# Patient Record
Sex: FEMALE | Race: WHITE | NOT HISPANIC OR LATINO | Employment: OTHER | URBAN - METROPOLITAN AREA
[De-identification: names, ages, dates, MRNs, and addresses within clinical notes are randomized per-mention and may not be internally consistent; named-entity substitution may affect disease eponyms.]

---

## 2017-05-23 ENCOUNTER — TRANSCRIBE ORDERS (OUTPATIENT)
Dept: ADMINISTRATIVE | Facility: HOSPITAL | Age: 62
End: 2017-05-23

## 2017-05-23 DIAGNOSIS — Z12.31 ENCOUNTER FOR SCREENING MAMMOGRAM FOR BREAST CANCER: Primary | ICD-10-CM

## 2017-06-06 ENCOUNTER — HOSPITAL ENCOUNTER (OUTPATIENT)
Dept: MAMMOGRAPHY | Facility: HOSPITAL | Age: 62
Discharge: HOME | End: 2017-06-06
Attending: FAMILY MEDICINE | Admitting: FAMILY MEDICINE

## 2017-06-06 DIAGNOSIS — Z12.31 ENCOUNTER FOR SCREENING MAMMOGRAM FOR BREAST CANCER: ICD-10-CM

## 2017-06-06 PROCEDURE — 77063 BREAST TOMOSYNTHESIS BI: CPT

## 2017-06-06 PROCEDURE — G0202 SCR MAMMO BI INCL CAD: HCPCS

## 2018-04-30 ENCOUNTER — TRANSCRIBE ORDERS (OUTPATIENT)
Dept: ADMINISTRATIVE | Facility: HOSPITAL | Age: 63
End: 2018-04-30

## 2018-04-30 DIAGNOSIS — Z12.31 VISIT FOR SCREENING MAMMOGRAM: Primary | ICD-10-CM

## 2018-06-07 ENCOUNTER — HOSPITAL ENCOUNTER (OUTPATIENT)
Dept: MAMMOGRAPHY | Facility: HOSPITAL | Age: 63
Discharge: HOME | End: 2018-06-07
Attending: FAMILY MEDICINE | Admitting: FAMILY MEDICINE

## 2018-06-07 DIAGNOSIS — Z12.31 VISIT FOR SCREENING MAMMOGRAM: ICD-10-CM

## 2018-06-07 PROCEDURE — 77067 SCR MAMMO BI INCL CAD: CPT

## 2018-06-07 PROCEDURE — 77063 BREAST TOMOSYNTHESIS BI: CPT

## 2019-03-07 ENCOUNTER — OFFICE VISIT (OUTPATIENT)
Dept: OBSTETRICS AND GYNECOLOGY | Facility: CLINIC | Age: 64
End: 2019-03-07

## 2019-03-07 VITALS
SYSTOLIC BLOOD PRESSURE: 124 MMHG | WEIGHT: 182 LBS | DIASTOLIC BLOOD PRESSURE: 86 MMHG | BODY MASS INDEX: 32.25 KG/M2 | HEIGHT: 63 IN

## 2019-03-07 DIAGNOSIS — Z01.419 PAP SMEAR, LOW-RISK: Primary | ICD-10-CM

## 2019-03-07 DIAGNOSIS — Z01.419 ENCOUNTER FOR GYNECOLOGICAL EXAMINATION WITHOUT ABNORMAL FINDING: ICD-10-CM

## 2019-03-07 DIAGNOSIS — Z13.9 SCREENING FOR CONDITION: ICD-10-CM

## 2019-03-07 LAB
BILIRUB BLD-MCNC: NEGATIVE MG/DL
CLARITY, POC: CLEAR
COLOR UR: YELLOW
GLUCOSE UR STRIP-MCNC: NEGATIVE MG/DL
KETONES UR QL: NEGATIVE
LEUKOCYTE EST, POC: NEGATIVE
NITRITE UR-MCNC: NEGATIVE MG/ML
PH UR: 5 [PH] (ref 5–8)
PROT UR STRIP-MCNC: NEGATIVE MG/DL
RBC # UR STRIP: NEGATIVE /UL
SP GR UR: 1.03 (ref 1–1.03)
UROBILINOGEN UR QL: NORMAL

## 2019-03-07 PROCEDURE — 99397 PER PM REEVAL EST PAT 65+ YR: CPT | Performed by: OBSTETRICS & GYNECOLOGY

## 2019-03-07 PROCEDURE — 81002 URINALYSIS NONAUTO W/O SCOPE: CPT | Performed by: OBSTETRICS & GYNECOLOGY

## 2019-03-07 RX ORDER — LOSARTAN POTASSIUM 50 MG/1
50 TABLET ORAL DAILY
COMMUNITY
Start: 2014-11-04

## 2019-03-07 RX ORDER — AMLODIPINE BESYLATE 10 MG/1
10 TABLET ORAL DAILY
COMMUNITY
Start: 2014-11-04

## 2019-03-07 RX ORDER — TRIAMTERENE AND HYDROCHLOROTHIAZIDE 37.5; 25 MG/1; MG/1
1 TABLET ORAL EVERY OTHER DAY
COMMUNITY

## 2019-03-07 RX ORDER — ALLOPURINOL 100 MG/1
100 TABLET ORAL 2 TIMES DAILY
COMMUNITY
Start: 2019-02-19

## 2019-03-07 RX ORDER — CARVEDILOL 25 MG/1
25 TABLET ORAL 2 TIMES DAILY WITH MEALS
COMMUNITY
Start: 2019-02-19

## 2019-03-07 RX ORDER — ATORVASTATIN CALCIUM 10 MG/1
10 TABLET, FILM COATED ORAL DAILY
COMMUNITY
Start: 2014-11-04

## 2019-03-07 RX ORDER — CHLORHEXIDINE GLUCONATE 0.12 MG/ML
RINSE ORAL
COMMUNITY
Start: 2019-02-19 | End: 2019-06-24

## 2019-03-07 RX ORDER — MEDROXYPROGESTERONE ACETATE 150 MG/ML
50 INJECTION, SUSPENSION INTRAMUSCULAR
COMMUNITY
Start: 2019-01-05

## 2019-03-07 RX ORDER — FOLIC ACID 1 MG/1
400 TABLET ORAL DAILY
COMMUNITY
Start: 2014-11-04

## 2019-03-07 RX ORDER — RANITIDINE 150 MG/1
TABLET ORAL
COMMUNITY
End: 2019-06-24

## 2019-03-07 RX ORDER — ZOSTER VACCINE RECOMBINANT, ADJUVANTED 50 MCG/0.5
KIT INTRAMUSCULAR
COMMUNITY
Start: 2018-12-31

## 2019-03-07 RX ORDER — NAPROXEN SODIUM 220 MG
1 TABLET ORAL EVERY 12 HOURS
COMMUNITY
Start: 2014-11-04 | End: 2021-05-20 | Stop reason: HOSPADM

## 2019-03-07 NOTE — PROGRESS NOTES
GYN Annual Exam     CC- Here for annual exam.     Leann Andrew is a 63 y.o. female new patient who presents for annual well woman exam. She had BALTAZAR BSO at age 48 for endometriosis. No HRT, no  VB.       OB History      Para Term  AB Living    0 0 0 0 0 0    SAB TAB Ectopic Molar Multiple Live Births    0 0 0 0 0 0        Obstetric Comments    infertility          Menarche:11  Menopause:- BALTAZAR BSO endometriosis  HRT:none  Current contraception: post menopausal status  History of abnormal Pap smear: no  History of abnormal mammogram: no  Family history of uterine, colon or ovarian cancer: no  Family history of breast cancer: no  STD's: none  Last pap smear:3-4 years ago      Health Maintenance   Topic Date Due   • TDAP/TD VACCINES (1 - Tdap) 1972   • ZOSTER VACCINE (1 of 2) 2003   • PNEUMOCOCCAL VACCINES (65+ LOW/MEDIUM RISK) (1 of 2 - PCV13) 2018   • HEPATITIS C SCREENING  2019   • MEDICARE ANNUAL WELLNESS  2019   • COLONOSCOPY  2019   • INFLUENZA VACCINE  Completed       Past Medical History:   Diagnosis Date   • Endometriosis    • Female infertility    • GERD (gastroesophageal reflux disease)    • Hyperlipidemia    • Hypertension    • RA (rheumatoid arthritis) (CMS/HCC)        History reviewed. No pertinent surgical history.      Current Outpatient Medications:   •  adalimumab (HUMIRA) 40 MG/0.8ML Prefilled Syringe Kit injection, Inject  under the skin into the appropriate area as directed., Disp: , Rfl:   •  amLODIPine (NORVASC) 10 MG tablet, Take 1 tablet by mouth., Disp: , Rfl:   •  atorvastatin (LIPITOR) 10 MG tablet, Take 1 tablet by mouth., Disp: , Rfl:   •  folic acid (FOLVITE) 1 MG tablet, TAKE ONE TABLET BY MOUTH DAILY, Disp: , Rfl:   •  losartan (COZAAR) 50 MG tablet, Take 1 tablet by mouth., Disp: , Rfl:   •  naproxen sodium (ALEVE) 220 MG tablet, Take 1 tablet by mouth Every 12 (Twelve) Hours., Disp: , Rfl:   •  allopurinol (ZYLOPRIM) 100 MG  "tablet, , Disp: , Rfl:   •  carvedilol (COREG) 25 MG tablet, , Disp: , Rfl:   •  Cetirizine HCl 10 MG capsule, 1 q o day, Disp: , Rfl:   •  chlorhexidine (PERIDEX) 0.12 % solution, , Disp: , Rfl:   •  Cholecalciferol (VITAMIN D3) 1000 units capsule, Take 1 tablet by mouth., Disp: , Rfl:   •  ENBREL SURECLICK 50 MG/ML solution auto-injector, , Disp: , Rfl:   •  methotrexate 2.5 MG tablet, , Disp: , Rfl:   •  raNITIdine (ZANTAC) 150 MG tablet, 1 q o day, Disp: , Rfl:   •  SHINGRIX 50 MCG/0.5ML reconstituted suspension, , Disp: , Rfl:   •  triamterene-hydrochlorothiazide (MAXZIDE-25) 37.5-25 MG per tablet, Take 1 tablet by mouth., Disp: , Rfl:     Allergies   Allergen Reactions   • Lisinopril Other (See Comments)       Social History     Tobacco Use   • Smoking status: Never Smoker   • Smokeless tobacco: Never Used   Substance Use Topics   • Alcohol use: Yes     Comment: wine occasional   • Drug use: No       Family History   Problem Relation Age of Onset   • Breast cancer Neg Hx        Review of Systems   Constitutional: Negative for appetite change, fatigue, fever and unexpected weight change.   Respiratory: Negative for cough and shortness of breath.    Cardiovascular: Negative for chest pain and palpitations.   Gastrointestinal: Negative for abdominal distention, abdominal pain, constipation, diarrhea and nausea.   Endocrine: Negative for cold intolerance and heat intolerance.   Genitourinary: Negative for dyspareunia, dysuria, menstrual problem, pelvic pain and vaginal discharge.   Skin: Negative for color change and rash.   Neurological: Negative for headaches.   Psychiatric/Behavioral: Negative for dysphoric mood. The patient is not nervous/anxious.        /86   Ht 160 cm (63\")   Wt 82.6 kg (182 lb)   LMP  (LMP Unknown)   BMI 32.24 kg/m²     Physical Exam   Constitutional: She is oriented to person, place, and time. She appears well-developed and well-nourished.   HENT:   Head: Normocephalic and " atraumatic.   Eyes: Conjunctivae are normal. No scleral icterus.   Neck: Neck supple. No thyromegaly present.   Cardiovascular: Normal rate and regular rhythm.   Pulmonary/Chest: Effort normal and breath sounds normal. Right breast exhibits no inverted nipple, no mass, no nipple discharge, no skin change and no tenderness. Left breast exhibits no inverted nipple, no mass, no nipple discharge, no skin change and no tenderness.   Abdominal: Soft. Bowel sounds are normal. She exhibits no distension and no mass. There is no tenderness. There is no rebound and no guarding. No hernia.   Genitourinary: Pelvic exam was performed with patient supine. There is no rash, tenderness or lesion on the right labia. There is no rash, tenderness or lesion on the left labia. Right adnexum displays no mass, no tenderness and no fullness. Left adnexum displays no mass, no tenderness and no fullness. No erythema, tenderness or bleeding in the vagina. No foreign body in the vagina. No signs of injury around the vagina. No vaginal discharge found.   Genitourinary Comments: Normal cuff   Neurological: She is alert and oriented to person, place, and time.   Skin: Skin is warm and dry.   Psychiatric: She has a normal mood and affect. Her behavior is normal. Judgment and thought content normal.   Nursing note and vitals reviewed.         Assessment/Plan    1) GYN HM: check pap   SBE demonstrated and encouraged.  2) STD screening: declines Condoms encouraged.  3) Bone health - Weight bearing exercise, dietary calcium recommendations and vitamin D reviewed.   4) Diet and Exercise discussed  5) Smoking Status: No   6) Social: no issues  7)MMG:  UTD 6/2018, Birads 1  8) DEXA- UTD with Dr KENZIE Arzate  9)C scope- Due for 11/2019, pt will arrange herself.    Follow up prn and 1 year       Leann was seen today for gynecologic exam.    Diagnoses and all orders for this visit:    Pap smear, low-risk  -     Pap IG (Image Guided)    Screening for condition  -      POC Urinalysis Dipstick        Anne-Marie Solorzano MD  3/7/19  7:29 PM

## 2019-03-10 PROBLEM — E78.00 HYPERCHOLESTEREMIA: Status: ACTIVE | Noted: 2019-03-10

## 2019-03-10 PROBLEM — I10 HTN (HYPERTENSION): Status: ACTIVE | Noted: 2019-03-10

## 2019-03-11 LAB
CYTOLOGIST CVX/VAG CYTO: NORMAL
CYTOLOGY CVX/VAG DOC THIN PREP: NORMAL
DX ICD CODE: NORMAL
HIV 1 & 2 AB SER-IMP: NORMAL
OTHER STN SPEC: NORMAL
PATH REPORT.FINAL DX SPEC: NORMAL
STAT OF ADQ CVX/VAG CYTO-IMP: NORMAL

## 2019-04-23 ENCOUNTER — TRANSCRIBE ORDERS (OUTPATIENT)
Dept: ADMINISTRATIVE | Facility: HOSPITAL | Age: 64
End: 2019-04-23

## 2019-04-23 DIAGNOSIS — Z78.0 MENOPAUSE: Primary | ICD-10-CM

## 2019-05-01 ENCOUNTER — TRANSCRIBE ORDERS (OUTPATIENT)
Dept: OBSTETRICS AND GYNECOLOGY | Facility: CLINIC | Age: 64
End: 2019-05-01

## 2019-05-01 DIAGNOSIS — Z12.39 SCREENING BREAST EXAMINATION: Primary | ICD-10-CM

## 2019-05-14 ENCOUNTER — APPOINTMENT (OUTPATIENT)
Dept: BONE DENSITY | Facility: HOSPITAL | Age: 64
End: 2019-05-14

## 2019-05-14 DIAGNOSIS — Z78.0 MENOPAUSE: ICD-10-CM

## 2019-05-14 PROCEDURE — 77080 DXA BONE DENSITY AXIAL: CPT

## 2019-06-03 ENCOUNTER — TREATMENT (OUTPATIENT)
Dept: PHYSICAL THERAPY | Facility: CLINIC | Age: 64
End: 2019-06-03

## 2019-06-03 DIAGNOSIS — R52 PAIN AGGRAVATED BY SITTING: ICD-10-CM

## 2019-06-03 DIAGNOSIS — R26.2 DIFFICULTY WALKING: ICD-10-CM

## 2019-06-03 DIAGNOSIS — M54.42 ACUTE LEFT-SIDED LOW BACK PAIN WITH LEFT-SIDED SCIATICA: Primary | ICD-10-CM

## 2019-06-03 DIAGNOSIS — M06.9 RHEUMATOID ARTHRITIS, INVOLVING UNSPECIFIED SITE, UNSPECIFIED RHEUMATOID FACTOR PRESENCE: ICD-10-CM

## 2019-06-03 PROCEDURE — G0283 ELEC STIM OTHER THAN WOUND: HCPCS | Performed by: PHYSICAL THERAPIST

## 2019-06-03 PROCEDURE — 97140 MANUAL THERAPY 1/> REGIONS: CPT | Performed by: PHYSICAL THERAPIST

## 2019-06-03 PROCEDURE — 97035 APP MDLTY 1+ULTRASOUND EA 15: CPT | Performed by: PHYSICAL THERAPIST

## 2019-06-03 PROCEDURE — 97110 THERAPEUTIC EXERCISES: CPT | Performed by: PHYSICAL THERAPIST

## 2019-06-03 PROCEDURE — 97161 PT EVAL LOW COMPLEX 20 MIN: CPT | Performed by: PHYSICAL THERAPIST

## 2019-06-03 NOTE — PROGRESS NOTES
Physical Therapy Initial Evaluation and Plan of Care         Patient: Leann Andrew   : 1955  Diagnosis/ICD-10 Code:  Acute left-sided low back pain with left-sided sciatica [M54.42]  Referring practitioner: TOÑO Harris*  Date of Initial Visit: 6/3/2019  Today's Date: 6/3/2019  Patient seen for 1 sessions           Subjective Questionnaire: LEFS: 22% normal function      Subjective Evaluation    History of Present Illness  Date of onset: 5/3/2019  Mechanism of injury: Hip started bothering her.  Gone a week on vacation.  Had numbness in anterior left leg.  Progressive pain in left hip and lower leg.      Subjective comment: No prior injury ot area. H/o ddd in lumbar. Had dose pack, no change, low dose steroids without relief, no relief with ALEVE. No giving way. No valsalva, - bowel/bladder.  Increased pain at night. Can't walk without severe pain, not able to do housework, shopping, etc.  Patient Occupation: Retired Pain  At worst pain rating: 10  Location: starts in left SI. Radiates to outer thigh and inner leg left leg.  Quality: sharp, radiating and needle-like (arthritis pain in back, knife in leg)  Relieving factors: heat (sleeping on couch, side sitting on right hip, lying on right)  Exacerbated by: sit, walk, lying on left side.  Progression: no change    Social Support  Lives in: one-story house (stairs to basement)    Diagnostic Tests  X-ray: normal (no fracture)  Abnormal MRI: DDD lumbar.    Treatments  No previous or current treatments  Patient Goals  Patient goals for therapy: decreased pain, independence with ADLs/IADLs and return to sport/leisure activities  Patient goal: [employer]           Objective     Special Questions  Patient is experiencing night pain and disturbed sleep.       Postural Observations    Additional Postural Observation Details  Left inferior ASIS, inferior iliac crest, superior left PSIS.    Tenderness     Left Hip   Tenderness in the PSIS.      Additional Tenderness Details  Mild PSIS, piriformis.  None at lumbar spine or musculatrue    Neurological Testing     Sensation     Lumbar   Left   Diminished: light touch    Right   Intact: light touch    Comments   Left light touch: left anterior leg (medial and lateral)    Reflexes   Left   Patellar (L4): trace (1+)  Achilles (S1): normal (2+)    Right   Patellar (L4): normal (2+)  Achilles (S1): normal (2+)    Active Range of Motion     Lumbar   Flexion: 90 (relief) degrees   Extension: -5 degrees with pain  Left lateral flexion: 15 degrees   Right lateral flexion: 10 degrees with pain  Left rotation: 75 (relief %) degrees   Right rotation: 50 degrees with pain    Strength/Myotome Testing     Left Hip   Planes of Motion   Flexion: 5    Right Hip   Planes of Motion   Flexion: 5    Left Knee   Flexion: 5  Extension: 5    Right Knee   Flexion: 5  Extension: 5    Left Ankle/Foot   Dorsiflexion: 5  Plantar flexion: 5  Great toe extension: 5    Right Ankle/Foot   Dorsiflexion: 5  Plantar flexion: 5  Great toe extension: 5    Tests     Lumbar     Left   Positive passive SLR.   Negative crossed SLR.     Left Pelvic Girdle/Sacrum   Positive: sacrum compression, gapping and thigh thrust.     Left Hip   Positive Gaenslen's.     Additional Tests Details  Supine SLR (+), (-) seated  Left HS 90/90 test -10.         Assessment & Plan     Assessment  Impairments: abnormal gait, abnormal or restricted ROM, activity intolerance, impaired physical strength, lacks appropriate home exercise program, pain with function and weight-bearing intolerance  Assessment details: Patient is a 64 yo female with insidious onset of left hip pain that radiates into the anterior leg and thigh.  She also has some numbness and tingling.  Neuro testing is normal except she has decreased patellar reflex.  She has tenderness in the left SI joint and left piriformis.   Hip joint and lumbar appears normal.  She has pelvic rotation and have some pain  with SI provocation testing.  Patient has severe pain with lumbar extension, right SB, and right rotation with decreased ROM.  Based on the above findings she is an excellent candidate for conservative treatment and therapy.    Prognosis: good  Prognosis details:      Functional Limitations: carrying objects, lifting, sleeping, walking, pulling, pushing, uncomfortable because of pain, moving in bed, sitting, standing and stooping  Goals  Plan Goals:  STG X 3 weeks  1.  Patient will demonstrate correct alignment 50% of the time.  2.  Patient will tolerate initial stabilization exercises.  3. Reduce pain to intermittent  4.  Patient will demonstrate correct body mechanics and posture with cueing.  5.  Reduce radicular symptoms 50%.  LTG X 6 weeks  1.  Patient to have normal pelvic alignment.  2.  4/5 hip and core strength.  3.  Full ROM with hips and lumbar spine.  4.  (-) special tests.  5.  Patient to return to ADL's and walking with tolerable symptoms while staying in normal pelvic alignment.      Plan  Therapy options: will be seen for skilled physical therapy services  Planned modality interventions: high voltage pulsed current (pain management), ultrasound and cryotherapy  Planned therapy interventions: abdominal trunk stabilization, manual therapy, balance/weight-bearing training, neuromuscular re-education, stretching, strengthening, therapeutic activities, functional ROM exercises, flexibility and home exercise program  Frequency: 2x week  Duration in weeks: 6  Treatment plan discussed with: patient        Manual Therapy:    10     mins  37022;  Therapeutic Exercise:    8     mins  74811;     Neuromuscular Estelita:    -    mins  51235;    Therapeutic Activity:     -     mins  89039;     Gait Training:      -     mins  06666;     Ultrasound:     8     mins  00350;    Electrical Stimulation:    15     mins  66282 ( );  Dry Needling     -     mins self-pay    Timed Treatment:   26   mins   Total Treatment:      60   mins    PT SIGNATURE: Ruba Dela Cruz, PT   DATE TREATMENT INITIATED: 6/3/2019    Initial Certification  Certification Period: 9/1/2019  I certify that the therapy services are furnished while this patient is under my care.  The services outlined above are required by this patient, and will be reviewed every 90 days.     PHYSICIAN: Margaret Lopez, APRN      DATE:     Please sign and return via fax to [phone]. Thank you, [facility].

## 2019-06-07 ENCOUNTER — TREATMENT (OUTPATIENT)
Dept: PHYSICAL THERAPY | Facility: CLINIC | Age: 64
End: 2019-06-07

## 2019-06-07 DIAGNOSIS — M54.42 ACUTE LEFT-SIDED LOW BACK PAIN WITH LEFT-SIDED SCIATICA: Primary | ICD-10-CM

## 2019-06-07 DIAGNOSIS — R26.2 DIFFICULTY WALKING: ICD-10-CM

## 2019-06-07 DIAGNOSIS — M06.9 RHEUMATOID ARTHRITIS, INVOLVING UNSPECIFIED SITE, UNSPECIFIED RHEUMATOID FACTOR PRESENCE: ICD-10-CM

## 2019-06-07 DIAGNOSIS — R52 PAIN AGGRAVATED BY SITTING: ICD-10-CM

## 2019-06-07 PROCEDURE — G0283 ELEC STIM OTHER THAN WOUND: HCPCS | Performed by: PHYSICAL THERAPIST

## 2019-06-07 PROCEDURE — 97035 APP MDLTY 1+ULTRASOUND EA 15: CPT | Performed by: PHYSICAL THERAPIST

## 2019-06-07 PROCEDURE — 97110 THERAPEUTIC EXERCISES: CPT | Performed by: PHYSICAL THERAPIST

## 2019-06-07 NOTE — PROGRESS NOTES
Physical Therapy Daily Progress Note         Visit # : 2  Leann Andrew reports: I've been a little better because I've stayed off of it.  But last night pain was terrible when I was lying down. Some exercises are painful.  Hip more painful than leg except last night.    Subjective     Objective   See Exercise, Manual, and Modality Logs for complete treatment.   Flexion based exercises with relief  Increased pain with single leg traction, manual traction, gapping at lumbar spine, any ROM or mobilization at hip.    Assessment/Plan  Continued radiating pain into left LE.  No tenderness at spine, valsalva, or bowel/bladder signs or symptoms.  No relief with any positioning or mobilization tried.  Only able to tolerate flexion based exercises.    Other requested patient make appt with primary care MD for possible further diagnostics or treatment options.  Can continue therapy if she feels she is gettting some relief but would like to have MD assess.           Manual Therapy:    8     mins  22625;  Therapeutic Exercise:    8     mins  90033;     Neuromuscular Estelita:    -    mins  26938;    Therapeutic Activity:     -     mins  22168;     Gait Training:      -     mins  47499;     Ultrasound:     8     mins  13724;    Electrical Stimulation:    15     mins  70111 ( );  Dry Needling     -     mins self-pay    Timed Treatment:   24   mins   Total Treatment:     55   mins    Ruba Dela Cruz, PT  Physical Therapist

## 2019-06-10 ENCOUNTER — DOCUMENTATION (OUTPATIENT)
Dept: PHYSICAL THERAPY | Facility: CLINIC | Age: 64
End: 2019-06-10

## 2019-06-10 NOTE — PROGRESS NOTES
Patient called today.  Stated she did talk to her physician about her ongoing pain.  An MRI has been ordered and therapy is being held at this point.

## 2019-06-11 ENCOUNTER — APPOINTMENT (OUTPATIENT)
Dept: MAMMOGRAPHY | Facility: HOSPITAL | Age: 64
End: 2019-06-11

## 2019-06-19 ENCOUNTER — OFFICE VISIT (OUTPATIENT)
Dept: ORTHOPEDIC SURGERY | Facility: CLINIC | Age: 64
End: 2019-06-19

## 2019-06-19 VITALS — TEMPERATURE: 97.8 F | WEIGHT: 165 LBS | BODY MASS INDEX: 29.23 KG/M2 | HEIGHT: 63 IN

## 2019-06-19 DIAGNOSIS — M54.50 LUMBAR SPINE PAIN: Primary | ICD-10-CM

## 2019-06-19 DIAGNOSIS — M48.061 SPINAL STENOSIS OF LUMBAR REGION, UNSPECIFIED WHETHER NEUROGENIC CLAUDICATION PRESENT: ICD-10-CM

## 2019-06-19 PROCEDURE — 72100 X-RAY EXAM L-S SPINE 2/3 VWS: CPT | Performed by: ORTHOPAEDIC SURGERY

## 2019-06-19 PROCEDURE — 99204 OFFICE O/P NEW MOD 45 MIN: CPT | Performed by: ORTHOPAEDIC SURGERY

## 2019-06-19 RX ORDER — HYDROCODONE BITARTRATE AND ACETAMINOPHEN 10; 325 MG/1; MG/1
TABLET ORAL AS NEEDED
COMMUNITY
Start: 2019-06-10 | End: 2021-05-20 | Stop reason: HOSPADM

## 2019-06-19 NOTE — PROGRESS NOTES
New patient or new problem visit    CC: Left hip and leg pain    HPI: 5-week history of severe intermittent left hip and leg pain grinding in nature worse with standing.  Physical therapy for 2 episodes has not helped.  Hydrocodone helps somewhat.  More hip than back pain.  She has lost 10 to 15 pounds over these weeks while she is been on the hydrocodone.  Is also using a cane.  I saw her a few years back for similar complaints and she improved at that time with therapy.    PFSH: See attached.      ROS: See attached.  No balance difficulty bowel or bladder complaints.    PE: Constitutional: Vital signs above-noted.  Awake, alert and oriented    Psychiatric: Affect and insight do not appear grossly disturbed.    Pulmonary: Breathing is unlabored, color is good.    Skin: Warm, dry and normal turgor    Cardiac: Pedal pulses intact.  No edema.    Eyesight and hearing appear adequate for examination purposes      Musculoskeletal:  There is mild tenderness to percussion and palpation of the spine. Motion appears undisturbed.  Posture is unremarkable to coronal and sagittal inspection.    The skin about the area is intact.  There is no palpable or visible deformity.  There is no local spasm.       Neurologic:   Reflexes are absent in the left patellae and present in the right patella and achilles.   Motor function is undisturbed in quadriceps, EHL, and gastrocnemius   sensation appears symmetrically intact to light touch.  In the bilateral lower extremities there is no evidence of atrophy.   Clonus is absent..  Gait appears undisturbed.  SLR test negative    XRAY: Plain film x-rays show loss of lordosis, slight scoliosis, and grade 1 spondylolisthesis at L3-4 and L4-5.  No significant change from prior films.  MRI scan obtained recently shows multilevel disc degeneration throughout the lumbar spine and then spondylolisthesis L3-4, L4-5 where there is bilateral foraminal stenosis and moderate canal stenosis and in a bit of  foraminal stenotic change of 5 1 as well.    Other: n/a    Impression: Widespread degenerative change and stenosis with back and hip pain.  Best if we can avoid surgery but because of the widespread nature of findings.    Plan: I am ordering lumbar epidural steroid injections and we will see her back as needed.

## 2019-06-24 ENCOUNTER — HOSPITAL ENCOUNTER (OUTPATIENT)
Dept: GENERAL RADIOLOGY | Facility: HOSPITAL | Age: 64
Discharge: HOME | End: 2019-06-24

## 2019-06-24 ENCOUNTER — ANESTHESIA (OUTPATIENT)
Dept: PAIN MEDICINE | Facility: HOSPITAL | Age: 64
End: 2019-06-24

## 2019-06-24 ENCOUNTER — ANESTHESIA EVENT (OUTPATIENT)
Dept: PAIN MEDICINE | Facility: HOSPITAL | Age: 64
End: 2019-06-24

## 2019-06-24 ENCOUNTER — HOSPITAL ENCOUNTER (OUTPATIENT)
Dept: PAIN MEDICINE | Facility: HOSPITAL | Age: 64
Discharge: HOME | End: 2019-06-24
Admitting: ORTHOPAEDIC SURGERY

## 2019-06-24 VITALS
OXYGEN SATURATION: 99 % | HEIGHT: 63 IN | WEIGHT: 165 LBS | TEMPERATURE: 98.4 F | RESPIRATION RATE: 16 BRPM | SYSTOLIC BLOOD PRESSURE: 111 MMHG | HEART RATE: 67 BPM | BODY MASS INDEX: 29.23 KG/M2 | DIASTOLIC BLOOD PRESSURE: 59 MMHG

## 2019-06-24 DIAGNOSIS — M48.061 SPINAL STENOSIS OF LUMBAR REGION, UNSPECIFIED WHETHER NEUROGENIC CLAUDICATION PRESENT: ICD-10-CM

## 2019-06-24 DIAGNOSIS — R52 PAIN: ICD-10-CM

## 2019-06-24 PROCEDURE — 77003 FLUOROGUIDE FOR SPINE INJECT: CPT

## 2019-06-24 PROCEDURE — 25010000002 METHYLPREDNISOLONE PER 80 MG: Performed by: ANESTHESIOLOGY

## 2019-06-24 PROCEDURE — C1755 CATHETER, INTRASPINAL: HCPCS

## 2019-06-24 RX ORDER — METHYLPREDNISOLONE ACETATE 80 MG/ML
80 INJECTION, SUSPENSION INTRA-ARTICULAR; INTRALESIONAL; INTRAMUSCULAR; SOFT TISSUE ONCE
Status: COMPLETED | OUTPATIENT
Start: 2019-06-24 | End: 2019-06-24

## 2019-06-24 RX ORDER — ASPIRIN 81 MG/1
81 TABLET, CHEWABLE ORAL DAILY
COMMUNITY

## 2019-06-24 RX ADMIN — METHYLPREDNISOLONE ACETATE 80 MG: 80 INJECTION, SUSPENSION INTRA-ARTICULAR; INTRALESIONAL; INTRAMUSCULAR; SOFT TISSUE at 14:06

## 2019-06-24 NOTE — H&P
[facility]    History and Physical    Patient Name: Leann Andrew  :  1955  MRN:  [id number]  Date of Admission: 2019    Subjective     Patient is a 63 y.o. female presents with chief complaint of acute, constant, excruciating low back and left lower extremity pain.  Onset of symptoms was abrupt starting 6 weeks ago.  Symptoms are associated/aggravated by activity, sitting, standing or walking for more than a few minutes. Symptoms improve with massage and pain medication.  On a pain scale from 0-10, she rates her pain is a 10 while at rest and a 10 with activity.  She describes the pain as burning, sharp and throbbing in nature.    Her MRI results from Dr. Mondragon's notes are as follows:    MRI scan obtained recently shows multilevel disc degeneration throughout the lumbar spine and then spondylolisthesis L3-4, L4-5 where there is bilateral foraminal stenosis and moderate canal stenosis and in a bit of foraminal stenotic change of 5 1 as well.        The following portions of the patients history were reviewed and updated as appropriate: current medications, allergies, past medical history, past surgical history, past family history, past social history and problem list                Objective     Past Medical History:   Past Medical History:   Diagnosis Date   • Allergic    • Anemia    • Cataract    • Chronic kidney disease    • Endometriosis    • Female infertility    • GERD (gastroesophageal reflux disease)    • Hyperlipidemia    • Hypertension    • RA (rheumatoid arthritis) (CMS/[facility])      Past Surgical History:   Past Surgical History:   Procedure Laterality Date   • FOOT SURGERY Right     RHEUMATOID BONE SPURS REMOVED, GRAFT FROM KNEE   • HYSTERECTOMY       Family History:   Family History   Problem Relation Age of Onset   • Breast cancer Neg Hx      Social History:   Social History     Tobacco Use   • Smoking status: Never Smoker   • Smokeless tobacco: Never Used   Substance Use Topics   •  "Alcohol use: Yes     Comment: wine occasional   • Drug use: No       Vital Signs Range for the last 24 hours  Temperature: Temp:  [36.9 °C (98.4 °F)] 36.9 °C (98.4 °F)   Temp Source: Temp src: Oral   BP: BP: (121)/(67) 121/67   Pulse: Heart Rate:  [63] 63   Respirations: Resp:  [16] 16   SPO2: SpO2:  [96 %] 96 %   O2 Amount (l/min):     O2 Devices Device (Oxygen Therapy): room air   Weight: Weight:  [74.8 kg (165 lb)] 74.8 kg (165 lb)     Flowsheet Rows      First Filed Value   Admission Height  160 cm (63\") Documented at 06/24/2019 1306   Admission Weight  74.8 kg (165 lb) Documented at 06/24/2019 1306          --------------------------------------------------------------------------------    Current Outpatient Medications   Medication Sig Dispense Refill   • allopurinol (ZYLOPRIM) 100 MG tablet      • amLODIPine (NORVASC) 10 MG tablet Take 1 tablet by mouth.     • aspirin 81 MG chewable tablet Chew 81 mg Daily.     • atorvastatin (LIPITOR) 10 MG tablet Take 1 tablet by mouth.     • carvedilol (COREG) 25 MG tablet      • Cholecalciferol (VITAMIN D3) 1000 units capsule Take 1 tablet by mouth.     • ENBREL SURECLICK 50 MG/ML solution auto-injector      • folic acid (FOLVITE) 1 MG tablet TAKE ONE TABLET BY MOUTH DAILY     • HYDROcodone-acetaminophen (NORCO)  MG per tablet As Needed.     • losartan (COZAAR) 50 MG tablet Take 1 tablet by mouth.     • methotrexate 2.5 MG tablet      • naproxen sodium (ALEVE) 220 MG tablet Take 1 tablet by mouth Every 12 (Twelve) Hours.     • SHINGRIX 50 MCG/0.5ML reconstituted suspension      • triamterene-hydrochlorothiazide (MAXZIDE-25) 37.5-25 MG per tablet Take 1 tablet by mouth.       No current facility-administered medications for this encounter.        --------------------------------------------------------------------------------  Assessment/Plan      Anesthesia Evaluation     Patient summary reviewed and Nursing notes reviewed   NPO Solid Status: > 8 hours  NPO Liquid " Status: > 2 hours    Pain impairs ability to perform ADLs: Dressing, Meal prep/Eating, Housekeeping, Ambulation and Sleeping  Modalities previously tried to control pain with limited effectiveness within the last 4-6 weeks: Ice, Rest, Heat, Prescription medications, Steroids and Physical therapy     Airway   Mallampati: II  TM distance: >3 FB  Neck ROM: full  Dental - normal exam     Pulmonary - negative pulmonary ROS and normal exam    breath sounds clear to auscultation  Cardiovascular - normal exam    Rhythm: regular  Rate: normal    (+) hypertension, hyperlipidemia,   (-) angina, orthopnea, PND, SALCEDO      Neuro/Psych- negative ROS  GI/Hepatic/Renal/Endo    (+)  GERD,      Musculoskeletal     Abdominal  - normal exam    Abdomen: soft.  Bowel sounds: normal.   Substance History - negative use     OB/GYN negative ob/gyn ROS         Other   (+) arthritis         Phys Exam Other:   NECK:  Supple without adenopathy, JVD or bruits.    EXTREMITIES:  There is no clubbing, cyanosis or edema.  Radial pulses are 1+ and equal bilaterally.  Examination of the lumbar spine shows no marked tenderness or discoloration.    SKIN:  Warm and dry.    NEUROLOGICAL EXAM:  Alert and oriented ×3.  Extraocular muscles intact.  Strength is normal and symmetrical in the lower extremities with respect to dorsal and plantar flexion of the ankles and quadriceps.           Diagnosis and Plan    Treatment Plan  ASA 3      Procedures: Lumbar Epidural Steroid Injection(LESI), With fluoroscopy,       Anesthetic plan and risks discussed with patient.        Alternative management options include physical therapy, medical management with nonsteroidal anti-inflammatory medications or narcotics, chiropractic manipulation and surgical intervention.    Diagnosis     * Degeneration of lumbar intervertebral disc [M51.36]     * Spinal stenosis of lumbar region with neurogenic claudication [M48.062]     * Lumbar radiculopathy [M54.16]

## 2019-06-24 NOTE — ANESTHESIA PROCEDURE NOTES
PAIN Epidural block      Patient reassessed immediately prior to procedure    Patient location during procedure: pain clinic  Start Time: 6/24/2019 1:53 PM  Stop Time: 6/24/2019 2:07 PM  Indication:procedure for pain  Performed By  Anesthesiologist: Audie Ramesh MD  Preanesthetic Checklist  Completed: patient identified, site marked, surgical consent, pre-op evaluation, timeout performed, risks and benefits discussed and monitors and equipment checked  Additional Notes  Post-Op Diagnosis Codes:     * Degeneration of lumbar intervertebral disc (M51.36)     * Spinal stenosis of lumbar region with neurogenic claudication (M48.062)     * Lumbar radiculopathy (M54.16)    I attempted at the L4-5 interspace using a left paramedian approach but was unable to access the space at that level due to bony interference.    The patient was observed in recovery with no evidence of neurological deficits or other problems. All questions were answered. The patient was discharged with appropriate instructions.  Prep:  Pt Position:prone  Sterile Tech:cap, gloves, mask and sterile barrier  Prep:chlorhexidine gluconate and isopropyl alcohol  Monitoring:blood pressure monitoring, continuous pulse oximetry and EKG  Procedure:Sedation: no     Approach:left paramedian  Guidance: fluoroscopy  Location:lumbar  Interspace: Interlaminar. L5-S1.  Needle Type:Tuohy  Needle Gauge:20 G  Aspiration:negative  Medications:  Depomedrol:80 mg  Preservative Free Saline:3mL  Comments:Contrast was not utilized due to poor kidney function.  Post Assessment:  Dressing:occlusive dressing applied  Pt Tolerance:patient tolerated the procedure well with no apparent complications  Complications:no

## 2019-07-22 ENCOUNTER — ANESTHESIA EVENT (OUTPATIENT)
Dept: PAIN MEDICINE | Facility: HOSPITAL | Age: 64
End: 2019-07-22

## 2019-07-22 ENCOUNTER — HOSPITAL ENCOUNTER (OUTPATIENT)
Dept: GENERAL RADIOLOGY | Facility: HOSPITAL | Age: 64
Discharge: HOME | End: 2019-07-22

## 2019-07-22 ENCOUNTER — HOSPITAL ENCOUNTER (OUTPATIENT)
Dept: PAIN MEDICINE | Facility: HOSPITAL | Age: 64
Discharge: HOME | End: 2019-07-22
Admitting: ANESTHESIOLOGY

## 2019-07-22 ENCOUNTER — ANESTHESIA (OUTPATIENT)
Dept: PAIN MEDICINE | Facility: HOSPITAL | Age: 64
End: 2019-07-22

## 2019-07-22 VITALS
SYSTOLIC BLOOD PRESSURE: 108 MMHG | TEMPERATURE: 98.6 F | RESPIRATION RATE: 16 BRPM | OXYGEN SATURATION: 99 % | HEART RATE: 65 BPM | DIASTOLIC BLOOD PRESSURE: 66 MMHG

## 2019-07-22 DIAGNOSIS — M48.061 SPINAL STENOSIS OF LUMBAR REGION, UNSPECIFIED WHETHER NEUROGENIC CLAUDICATION PRESENT: ICD-10-CM

## 2019-07-22 DIAGNOSIS — R52 PAIN: ICD-10-CM

## 2019-07-22 PROCEDURE — C1755 CATHETER, INTRASPINAL: HCPCS

## 2019-07-22 PROCEDURE — 0 IOPAMIDOL 41 % SOLUTION: Performed by: ANESTHESIOLOGY

## 2019-07-22 PROCEDURE — 25010000002 METHYLPREDNISOLONE PER 80 MG: Performed by: ANESTHESIOLOGY

## 2019-07-22 PROCEDURE — 77003 FLUOROGUIDE FOR SPINE INJECT: CPT

## 2019-07-22 RX ORDER — FENTANYL CITRATE 50 UG/ML
50 INJECTION, SOLUTION INTRAMUSCULAR; INTRAVENOUS AS NEEDED
Status: DISCONTINUED | OUTPATIENT
Start: 2019-07-22 | End: 2019-07-23 | Stop reason: HOSPADM

## 2019-07-22 RX ORDER — METHYLPREDNISOLONE ACETATE 80 MG/ML
80 INJECTION, SUSPENSION INTRA-ARTICULAR; INTRALESIONAL; INTRAMUSCULAR; SOFT TISSUE ONCE
Status: COMPLETED | OUTPATIENT
Start: 2019-07-22 | End: 2019-07-22

## 2019-07-22 RX ORDER — LIDOCAINE HYDROCHLORIDE 10 MG/ML
1 INJECTION, SOLUTION INFILTRATION; PERINEURAL ONCE AS NEEDED
Status: DISCONTINUED | OUTPATIENT
Start: 2019-07-22 | End: 2019-07-23 | Stop reason: HOSPADM

## 2019-07-22 RX ORDER — SODIUM CHLORIDE 0.9 % (FLUSH) 0.9 %
1-10 SYRINGE (ML) INJECTION AS NEEDED
Status: DISCONTINUED | OUTPATIENT
Start: 2019-07-22 | End: 2019-07-23 | Stop reason: HOSPADM

## 2019-07-22 RX ORDER — MIDAZOLAM HYDROCHLORIDE 1 MG/ML
1 INJECTION INTRAMUSCULAR; INTRAVENOUS AS NEEDED
Status: DISCONTINUED | OUTPATIENT
Start: 2019-07-22 | End: 2019-07-23 | Stop reason: HOSPADM

## 2019-07-22 RX ADMIN — METHYLPREDNISOLONE ACETATE 80 MG: 80 INJECTION, SUSPENSION INTRA-ARTICULAR; INTRALESIONAL; INTRAMUSCULAR; SOFT TISSUE at 10:35

## 2019-07-22 RX ADMIN — IOPAMIDOL 20 ML: 408 INJECTION, SOLUTION INTRATHECAL at 10:35

## 2019-07-22 NOTE — INTERVAL H&P NOTE
[facility]  H&P reviewed. No changes since last visit.  Patient states   25-50% improvement since the last procedure/injection.    Diagnosis     * Lumbar degenerative disc disease [M51.36]     * Lumbar spinal stenosis [M48.061]     * Lumbar radiculopathy [M54.16]      Airway assessed since last visit. Airway class equals: 2.

## 2019-07-22 NOTE — ANESTHESIA PROCEDURE NOTES
PAIN Epidural block    Pre-sedation assessment completed: 7/22/2019 10:19 AM    Patient reassessed immediately prior to procedure    Patient location during procedure: pain clinic  Indication:procedure for pain  Performed By  Anesthesiologist: Huang Au MD  Preanesthetic Checklist  Completed: patient identified, site marked, surgical consent, pre-op evaluation, timeout performed, IV checked, risks and benefits discussed and monitors and equipment checked  Additional Notes  Performed under fluoroscopy  Post-Op Diagnosis Codes:     * Lumbar degenerative disc disease (M51.36)     * Lumbar spinal stenosis (M48.061)     * Lumbar radiculopathy (M54.16)  Prep:  Pt Position:prone  Sterile Tech:cap, gloves, mask and sterile barrier  Prep:chlorhexidine gluconate and isopropyl alcohol  Monitoring:blood pressure monitoring, continuous pulse oximetry and EKG  Procedure:Sedation: no     Approach:left paramedian  Guidance: fluoroscopy  Location:lumbar (Intralaminar)  Level:4-5  Needle Type:Tuohy  Needle Gauge:20 G  Aspiration:negative  Test Dose:negative  Medications:  Depomedrol:80 mg  Preservative Free Saline:4mL  Isovue:3mL  Comments:Needle placement confirmed with epidural spread of contrast injection.  Post Assessment:  Post-procedure: Bandaid.  Pt Tolerance:patient tolerated the procedure well with no apparent complications  Complications:no

## 2019-07-23 ENCOUNTER — TRANSCRIBE ORDERS (OUTPATIENT)
Dept: ADMINISTRATIVE | Facility: HOSPITAL | Age: 64
End: 2019-07-23

## 2019-07-23 DIAGNOSIS — Z12.39 SCREENING BREAST EXAMINATION: Primary | ICD-10-CM

## 2019-08-01 ENCOUNTER — HOSPITAL ENCOUNTER (OUTPATIENT)
Dept: MAMMOGRAPHY | Facility: HOSPITAL | Age: 64
Discharge: HOME | End: 2019-08-01
Admitting: FAMILY MEDICINE

## 2019-08-01 DIAGNOSIS — Z12.39 SCREENING BREAST EXAMINATION: ICD-10-CM

## 2019-08-01 PROCEDURE — 77063 BREAST TOMOSYNTHESIS BI: CPT

## 2019-08-01 PROCEDURE — 77067 SCR MAMMO BI INCL CAD: CPT

## 2019-12-09 ENCOUNTER — HOSPITAL ENCOUNTER (OUTPATIENT)
Dept: GENERAL RADIOLOGY | Facility: HOSPITAL | Age: 64
Discharge: HOME | End: 2019-12-09

## 2019-12-09 ENCOUNTER — ANESTHESIA (OUTPATIENT)
Dept: PAIN MEDICINE | Facility: HOSPITAL | Age: 64
End: 2019-12-09

## 2019-12-09 ENCOUNTER — HOSPITAL ENCOUNTER (OUTPATIENT)
Dept: PAIN MEDICINE | Facility: HOSPITAL | Age: 64
Discharge: HOME | End: 2019-12-09
Admitting: ANESTHESIOLOGY

## 2019-12-09 ENCOUNTER — ANESTHESIA EVENT (OUTPATIENT)
Dept: PAIN MEDICINE | Facility: HOSPITAL | Age: 64
End: 2019-12-09

## 2019-12-09 VITALS
WEIGHT: 163 LBS | DIASTOLIC BLOOD PRESSURE: 70 MMHG | HEART RATE: 59 BPM | SYSTOLIC BLOOD PRESSURE: 121 MMHG | RESPIRATION RATE: 16 BRPM | TEMPERATURE: 98.6 F | OXYGEN SATURATION: 100 % | BODY MASS INDEX: 28.87 KG/M2

## 2019-12-09 DIAGNOSIS — M51.36 DDD (DEGENERATIVE DISC DISEASE), LUMBAR: Primary | ICD-10-CM

## 2019-12-09 DIAGNOSIS — R52 PAIN: ICD-10-CM

## 2019-12-09 PROBLEM — M51.369 DDD (DEGENERATIVE DISC DISEASE), LUMBAR: Status: ACTIVE | Noted: 2019-12-09

## 2019-12-09 PROCEDURE — C1755 CATHETER, INTRASPINAL: HCPCS

## 2019-12-09 PROCEDURE — 25010000002 METHYLPREDNISOLONE PER 80 MG: Performed by: ANESTHESIOLOGY

## 2019-12-09 PROCEDURE — 77003 FLUOROGUIDE FOR SPINE INJECT: CPT

## 2019-12-09 PROCEDURE — 0 IOPAMIDOL 41 % SOLUTION: Performed by: ANESTHESIOLOGY

## 2019-12-09 RX ORDER — MIDAZOLAM HYDROCHLORIDE 1 MG/ML
1 INJECTION INTRAMUSCULAR; INTRAVENOUS AS NEEDED
Status: DISCONTINUED | OUTPATIENT
Start: 2019-12-09 | End: 2019-12-10 | Stop reason: HOSPADM

## 2019-12-09 RX ORDER — FENTANYL CITRATE 50 UG/ML
50 INJECTION, SOLUTION INTRAMUSCULAR; INTRAVENOUS AS NEEDED
Status: DISCONTINUED | OUTPATIENT
Start: 2019-12-09 | End: 2019-12-10 | Stop reason: HOSPADM

## 2019-12-09 RX ORDER — LIDOCAINE HYDROCHLORIDE 10 MG/ML
1 INJECTION, SOLUTION INFILTRATION; PERINEURAL ONCE
Status: DISCONTINUED | OUTPATIENT
Start: 2019-12-09 | End: 2019-12-10 | Stop reason: HOSPADM

## 2019-12-09 RX ORDER — METHYLPREDNISOLONE ACETATE 80 MG/ML
80 INJECTION, SUSPENSION INTRA-ARTICULAR; INTRALESIONAL; INTRAMUSCULAR; SOFT TISSUE ONCE
Status: COMPLETED | OUTPATIENT
Start: 2019-12-09 | End: 2019-12-09

## 2019-12-09 RX ORDER — SODIUM CHLORIDE 0.9 % (FLUSH) 0.9 %
3-10 SYRINGE (ML) INJECTION AS NEEDED
Status: DISCONTINUED | OUTPATIENT
Start: 2019-12-09 | End: 2019-12-10 | Stop reason: HOSPADM

## 2019-12-09 RX ORDER — SODIUM CHLORIDE 0.9 % (FLUSH) 0.9 %
3 SYRINGE (ML) INJECTION EVERY 12 HOURS SCHEDULED
Status: DISCONTINUED | OUTPATIENT
Start: 2019-12-09 | End: 2019-12-10 | Stop reason: HOSPADM

## 2019-12-09 RX ADMIN — IOPAMIDOL 10 ML: 408 INJECTION, SOLUTION INTRATHECAL at 08:01

## 2019-12-09 RX ADMIN — METHYLPREDNISOLONE ACETATE 80 MG: 80 INJECTION, SUSPENSION INTRA-ARTICULAR; INTRALESIONAL; INTRAMUSCULAR; SOFT TISSUE at 08:01

## 2019-12-09 NOTE — ANESTHESIA PROCEDURE NOTES
PAIN Epidural block    Pre-sedation assessment completed: 12/9/2019 7:50 AM    Patient reassessed immediately prior to procedure    Patient location during procedure: pain clinic  Start Time: 12/9/2019 7:55 AM  Stop Time: 12/9/2019 8:02 AM  Indication:procedure for pain  Performed By  Anesthesiologist: French Escoto MD  Preanesthetic Checklist  Completed: patient identified, site marked, surgical consent, pre-op evaluation, timeout performed, IV checked, risks and benefits discussed and monitors and equipment checked  Additional Notes  Post-Op Diagnosis Codes:     * DDD (degenerative disc disease), lumbar (M51.36)     * Spondylolisthesis, lumbar region (M43.16)     * Lumbar stenosis without neurogenic claudication (M48.061)    The patient was observed in recovery with no evidence of neurological deficits or other problems. All questions were answered. The patient was discharged with appropriate instructions.  Prep:  Pt Position:prone  Sterile Tech:cap, gloves, mask and sterile barrier  Prep:chlorhexidine gluconate and isopropyl alcohol  Monitoring:blood pressure monitoring, continuous pulse oximetry and EKG  Procedure:Sedation: no     Approach:midline  Guidance: fluoroscopy  Location:lumbar  Level:4-5  Needle Type:Tuohy  Needle Gauge:20 G  Aspiration:negative  Medications:  Depomedrol:80 mg  Preservative Free Saline:3mL  Isovue:1mL  Comments:Appropriate epidural spread of contrast.   Post Assessment:  Post-procedure: Dressing per nursing.  Pt Tolerance:patient tolerated the procedure well with no apparent complications  Complications:no

## 2019-12-09 NOTE — H&P
CHIEF COMPLAINT:  Low back pain        HISTORY OF PRESENT ILLNESS:  This patient is complaining of low back pain which radiated previously down her left leg in the posterior lateral and anterior distributions.  She states that since the last epidural steroid injection, the pain in her left leg is pretty much gone.  She continues to have pain in her low back and is requesting a third epidural in hopes of even further relief.  She is very pleased with the relief she is done so far.  Also, she has started water aerobics and believes that this is helpful for her.  The pain is significantly decreasing the patient's Activities of Daily Living.      This patient was referred to our clinic by Dr. Milton Mondragon for lumbar epidural steroid injections.    Prior injections afforded greater than 75 % relief of pain and significantly increased activities of daily living.      PAST MEDICAL HISTORY:  Current Outpatient Medications on File Prior to Encounter   Medication Sig Dispense Refill   • allopurinol (ZYLOPRIM) 100 MG tablet      • amLODIPine (NORVASC) 10 MG tablet Take 1 tablet by mouth.     • atorvastatin (LIPITOR) 10 MG tablet Take 1 tablet by mouth.     • carvedilol (COREG) 25 MG tablet      • Cholecalciferol (VITAMIN D3) 1000 units capsule Take 1 tablet by mouth.     • ENBREL SURECLICK 50 MG/ML solution auto-injector      • folic acid (FOLVITE) 1 MG tablet TAKE ONE TABLET BY MOUTH DAILY     • HYDROcodone-acetaminophen (NORCO)  MG per tablet As Needed.     • losartan (COZAAR) 50 MG tablet Take 1 tablet by mouth.     • methotrexate 2.5 MG tablet      • naproxen sodium (ALEVE) 220 MG tablet Take 1 tablet by mouth Every 12 (Twelve) Hours.     • triamterene-hydrochlorothiazide (MAXZIDE-25) 37.5-25 MG per tablet Take 1 tablet by mouth.     • aspirin 81 MG chewable tablet Chew 81 mg Daily.     • SHINGRIX 50 MCG/0.5ML reconstituted suspension        No current facility-administered medications on file prior to encounter.         Past Medical History:   Diagnosis Date   • Allergic    • Anemia    • Cataract    • Chronic kidney disease    • Endometriosis    • Female infertility    • GERD (gastroesophageal reflux disease)    • Hyperlipidemia    • Hypertension    • Low back pain    • RA (rheumatoid arthritis) (CMS/[facility])        SOCIAL HISTORY:  Counseled to avoid tobacco     REVIEW OF SYSTEMS:  No pertinent hematologic, infectious, or constitutional symptoms.  Other review of systems non-contributory     PHYSICAL EXAM:  /69 (BP Location: Left arm, Patient Position: Lying)   Pulse 59   Temp 37 °C (98.6 °F) (Oral)   Resp 16   Wt 73.9 kg (163 lb)   LMP  (LMP Unknown)   SpO2 96%   BMI 28.87 kg/m²   Constitutional: Well-developed well-nourished no acute distress  General: Alert and oriented  Ophtho: EOMI  Airway: Mallampati 2  Cardiac:  Regular rate  Lungs:  Clear to auscultation bilaterally   GI: soft, nontender  Cervical Spine: Noncontributory  Sacroiliac Joints: Noncontributory  Musc: She has some tenderness palpation over the musculature of her low back  Neuro: Straight leg raise test is negative today     DIAGNOSIS:  Post-Op Diagnosis Codes:  Post-Op Diagnosis Codes:     * DDD (degenerative disc disease), lumbar [M51.36]     * Spondylolisthesis, lumbar region [M43.16]     * Lumbar stenosis without neurogenic claudication [M48.061]    PLAN:  -  Lumbar epidural steroid injections at the planned level of L4-L5.  This will be her third epidural in the series. If pain control is acceptable after this injection, it was discussed with the patient that they may return for the subsequent injections if and when their pain returns.    - Risks were discussed with the patient, including but not limited to: failure of relief, worsening pain, tissue, nerve, blood vessel or spinal cord damage, post-dural-puncture headache, bleeding, epidural hematoma, infection, and epidural abscess. Benefits and alternatives were also discussed. No promises  were made. The patient voiced understanding.  -  Physical therapy exercises at home should be considered, as tolerated, as prescribed by physical therapy or from the pain clinic handout (given to the patient).  Continuation of these exercises every day, or multiple times per week, even when the patient has good pain relief, was stressed to the patient as a preventative measure to decrease the frequency and severity of future pain episodes.  -  It is recommended that the patient use the least amount of opioid medication possible.     -  Heat and ice to the affected area as tolerated for pain control.  It was discussed that heating pads can cause burns.  -  Daily low impact exercise such as walking or water exercise was recommended to maintain overall health and aid in weight control.   -  Patient was counseled to abstain from tobacco products.  -  Follow up as needed for subsequent injections.  -I am pleased to see that she is doing water aerobics and that she continues doing this as tolerated.    French Escoto M.D.  [facility], [facility]  Board Certified Pain Medicine Specialist by the American Board of Anesthesiology  Board Certified Anesthesiologist by the American Board of Anesthesiology     Much of this encounter note is an electronic transcription/translation of spoken language to printed text. The electronic translation of spoken language may permit erroneous, or at times, nonsensical words or phrases to be inadvertently transcribed. Although I have reviewed the note for such errors, some may still exist.

## 2020-01-14 ENCOUNTER — OFFICE VISIT (OUTPATIENT)
Dept: ORTHOPEDIC SURGERY | Facility: CLINIC | Age: 65
End: 2020-01-14

## 2020-01-14 VITALS — HEIGHT: 63 IN | BODY MASS INDEX: 28.88 KG/M2 | WEIGHT: 163 LBS | TEMPERATURE: 97.5 F

## 2020-01-14 DIAGNOSIS — M48.062 SPINAL STENOSIS OF LUMBAR REGION WITH NEUROGENIC CLAUDICATION: ICD-10-CM

## 2020-01-14 DIAGNOSIS — M43.16 SPONDYLOLISTHESIS OF LUMBAR REGION: Primary | ICD-10-CM

## 2020-01-14 PROCEDURE — 99214 OFFICE O/P EST MOD 30 MIN: CPT | Performed by: ORTHOPAEDIC SURGERY

## 2020-01-14 NOTE — PROGRESS NOTES
She complains of return of right buttock pain low back pain previously at left buttock pain radiating to the thigh about equal parts back and leg pain.  Last summer epidurals helped pave the way for aqua therapy which helped immensely but late last year began developing recurrent symptoms which failed to improve with medications and an epidural in December.  On exam she exhibits good strength in the lower extremities bilaterally straight leg raise is negative mild tenderness to the right side of the lumbosacral junction.  I reviewed prior x-rays which demonstrate a slight lumbar scoliosis, significant loss of lordosis multilevel spondylolisthesis and spinal stenosis such that I think we be looking at L2-S1 decompression and probably L1-S1 fusion with instrumentation.  I discussed the risks and benefits of posterior spinal fusion, including where applicable, laminectomy.  Risks include adverse anesthetic events such as death, stroke and myocardial infarction.  More specific surgical risks include infection, nonunion, hardware failure, spinal fluid leakage, nerve root injury or paralysis, visceral or vascular injury, persistent pain, deep venous thrombosis, and pulmonary embolism among others. There is a 70 to 90 % chance of success.   Alternatives have been discussed.  She can try returning to aqua therapy with or without an epidural, live with this or consider undergoing decompression and fusion.  She can call and let me know what she wants to do.  25 minutes was spent in face-to-face consultation.

## 2020-01-27 ENCOUNTER — HOSPITAL ENCOUNTER (OUTPATIENT)
Dept: GENERAL RADIOLOGY | Facility: HOSPITAL | Age: 65
Discharge: HOME | End: 2020-01-27

## 2020-01-27 ENCOUNTER — TRANSCRIBE ORDERS (OUTPATIENT)
Dept: ADMINISTRATIVE | Facility: HOSPITAL | Age: 65
End: 2020-01-27

## 2020-01-27 ENCOUNTER — HOSPITAL ENCOUNTER (OUTPATIENT)
Dept: GENERAL RADIOLOGY | Facility: HOSPITAL | Age: 65
Discharge: HOME | End: 2020-01-27
Admitting: ORTHOPAEDIC SURGERY

## 2020-01-27 DIAGNOSIS — M54.50 LOW BACK PAIN, UNSPECIFIED BACK PAIN LATERALITY, UNSPECIFIED CHRONICITY, UNSPECIFIED WHETHER SCIATICA PRESENT: ICD-10-CM

## 2020-01-27 DIAGNOSIS — M54.2 NECK PAIN: ICD-10-CM

## 2020-01-27 DIAGNOSIS — M54.2 NECK PAIN: Primary | ICD-10-CM

## 2020-01-27 PROCEDURE — 72050 X-RAY EXAM NECK SPINE 4/5VWS: CPT

## 2020-01-27 PROCEDURE — 72040 X-RAY EXAM NECK SPINE 2-3 VW: CPT

## 2020-01-27 PROCEDURE — 72110 X-RAY EXAM L-2 SPINE 4/>VWS: CPT

## 2020-07-01 ENCOUNTER — TRANSCRIBE ORDERS (OUTPATIENT)
Dept: ADMINISTRATIVE | Facility: HOSPITAL | Age: 65
End: 2020-07-01

## 2020-07-01 DIAGNOSIS — Z12.31 SCREENING MAMMOGRAM FOR HIGH-RISK PATIENT: Primary | ICD-10-CM

## 2020-08-12 ENCOUNTER — HOSPITAL ENCOUNTER (OUTPATIENT)
Dept: MAMMOGRAPHY | Facility: HOSPITAL | Age: 65
Discharge: HOME | End: 2020-08-12
Admitting: OBSTETRICS & GYNECOLOGY

## 2020-08-12 DIAGNOSIS — Z12.31 SCREENING MAMMOGRAM FOR HIGH-RISK PATIENT: ICD-10-CM

## 2020-08-12 PROCEDURE — 77063 BREAST TOMOSYNTHESIS BI: CPT

## 2020-08-12 PROCEDURE — 77067 SCR MAMMO BI INCL CAD: CPT

## 2021-04-06 ENCOUNTER — PREP FOR SURGERY (OUTPATIENT)
Dept: OTHER | Facility: HOSPITAL | Age: 66
End: 2021-04-06

## 2021-04-06 DIAGNOSIS — Z12.11 SCREENING FOR COLON CANCER: Primary | ICD-10-CM

## 2021-04-12 PROBLEM — Z12.11 SCREENING FOR COLON CANCER: Status: ACTIVE | Noted: 2021-04-12

## 2021-05-03 ENCOUNTER — OFFICE VISIT (OUTPATIENT)
Dept: OBSTETRICS AND GYNECOLOGY | Facility: CLINIC | Age: 66
End: 2021-05-03

## 2021-05-03 VITALS
BODY MASS INDEX: 30.9 KG/M2 | WEIGHT: 181 LBS | HEIGHT: 64 IN | DIASTOLIC BLOOD PRESSURE: 72 MMHG | SYSTOLIC BLOOD PRESSURE: 134 MMHG

## 2021-05-03 DIAGNOSIS — Z01.419 ROUTINE GYNECOLOGICAL EXAMINATION: ICD-10-CM

## 2021-05-03 DIAGNOSIS — Z01.419 PAP SMEAR, LOW-RISK: Primary | ICD-10-CM

## 2021-05-03 DIAGNOSIS — Z12.31 ENCOUNTER FOR SCREENING MAMMOGRAM FOR MALIGNANT NEOPLASM OF BREAST: ICD-10-CM

## 2021-05-03 DIAGNOSIS — Z13.9 SCREENING FOR CONDITION: ICD-10-CM

## 2021-05-03 PROCEDURE — 81002 URINALYSIS NONAUTO W/O SCOPE: CPT | Performed by: OBSTETRICS & GYNECOLOGY

## 2021-05-03 PROCEDURE — G0101 CA SCREEN;PELVIC/BREAST EXAM: HCPCS | Performed by: OBSTETRICS & GYNECOLOGY

## 2021-05-05 ENCOUNTER — TRANSCRIBE ORDERS (OUTPATIENT)
Dept: ADMINISTRATIVE | Facility: HOSPITAL | Age: 66
End: 2021-05-05

## 2021-05-05 DIAGNOSIS — Z12.31 VISIT FOR SCREENING MAMMOGRAM: Primary | ICD-10-CM

## 2021-05-05 LAB
CYTOLOGIST CVX/VAG CYTO: NORMAL
CYTOLOGY CVX/VAG DOC CYTO: NORMAL
CYTOLOGY CVX/VAG DOC THIN PREP: NORMAL
DX ICD CODE: NORMAL
HIV 1 & 2 AB SER-IMP: NORMAL
OTHER STN SPEC: NORMAL
STAT OF ADQ CVX/VAG CYTO-IMP: NORMAL

## 2021-05-11 ENCOUNTER — TRANSCRIBE ORDERS (OUTPATIENT)
Dept: SLEEP MEDICINE | Facility: HOSPITAL | Age: 66
End: 2021-05-11

## 2021-05-11 DIAGNOSIS — Z01.818 OTHER SPECIFIED PRE-OPERATIVE EXAMINATION: Primary | ICD-10-CM

## 2021-05-18 ENCOUNTER — APPOINTMENT (OUTPATIENT)
Dept: LAB | Facility: HOSPITAL | Age: 66
End: 2021-05-18

## 2021-05-20 ENCOUNTER — ANESTHESIA (OUTPATIENT)
Dept: GASTROENTEROLOGY | Facility: HOSPITAL | Age: 66
End: 2021-05-20

## 2021-05-20 ENCOUNTER — HOSPITAL ENCOUNTER (OUTPATIENT)
Facility: HOSPITAL | Age: 66
Setting detail: HOSPITAL OUTPATIENT SURGERY
Discharge: HOME | End: 2021-05-20
Attending: SURGERY | Admitting: SURGERY

## 2021-05-20 ENCOUNTER — ANESTHESIA EVENT (OUTPATIENT)
Dept: GASTROENTEROLOGY | Facility: HOSPITAL | Age: 66
End: 2021-05-20

## 2021-05-20 VITALS
TEMPERATURE: 98.3 F | OXYGEN SATURATION: 98 % | RESPIRATION RATE: 16 BRPM | SYSTOLIC BLOOD PRESSURE: 112 MMHG | HEIGHT: 63 IN | DIASTOLIC BLOOD PRESSURE: 66 MMHG | BODY MASS INDEX: 31.66 KG/M2 | WEIGHT: 178.7 LBS | HEART RATE: 56 BPM

## 2021-05-20 DIAGNOSIS — Z12.11 SCREENING FOR COLON CANCER: ICD-10-CM

## 2021-05-20 PROCEDURE — 25010000002 PHENYLEPHRINE PER 1 ML: Performed by: ANESTHESIOLOGY

## 2021-05-20 PROCEDURE — 45380 COLONOSCOPY AND BIOPSY: CPT | Performed by: SURGERY

## 2021-05-20 PROCEDURE — S0260 H&P FOR SURGERY: HCPCS | Performed by: SURGERY

## 2021-05-20 PROCEDURE — 88305 TISSUE EXAM BY PATHOLOGIST: CPT | Performed by: SURGERY

## 2021-05-20 PROCEDURE — 25010000002 PROPOFOL 10 MG/ML EMULSION: Performed by: ANESTHESIOLOGY

## 2021-05-20 RX ORDER — SODIUM CHLORIDE 0.9 % (FLUSH) 0.9 %
10 SYRINGE (ML) INJECTION AS NEEDED
Status: DISCONTINUED | OUTPATIENT
Start: 2021-05-20 | End: 2021-05-20 | Stop reason: HOSPADM

## 2021-05-20 RX ORDER — PROPOFOL 10 MG/ML
VIAL (ML) INTRAVENOUS AS NEEDED
Status: DISCONTINUED | OUTPATIENT
Start: 2021-05-20 | End: 2021-05-20 | Stop reason: SURG

## 2021-05-20 RX ORDER — LIDOCAINE HYDROCHLORIDE 20 MG/ML
INJECTION, SOLUTION INFILTRATION; PERINEURAL AS NEEDED
Status: DISCONTINUED | OUTPATIENT
Start: 2021-05-20 | End: 2021-05-20 | Stop reason: SURG

## 2021-05-20 RX ORDER — GLYCOPYRROLATE 0.2 MG/ML
INJECTION INTRAMUSCULAR; INTRAVENOUS AS NEEDED
Status: DISCONTINUED | OUTPATIENT
Start: 2021-05-20 | End: 2021-05-20 | Stop reason: SURG

## 2021-05-20 RX ORDER — SODIUM CHLORIDE 0.9 % (FLUSH) 0.9 %
3 SYRINGE (ML) INJECTION EVERY 12 HOURS SCHEDULED
Status: DISCONTINUED | OUTPATIENT
Start: 2021-05-20 | End: 2021-05-20 | Stop reason: HOSPADM

## 2021-05-20 RX ORDER — PROPOFOL 10 MG/ML
VIAL (ML) INTRAVENOUS CONTINUOUS PRN
Status: DISCONTINUED | OUTPATIENT
Start: 2021-05-20 | End: 2021-05-20 | Stop reason: SURG

## 2021-05-20 RX ORDER — SODIUM CHLORIDE, SODIUM LACTATE, POTASSIUM CHLORIDE, CALCIUM CHLORIDE 600; 310; 30; 20 MG/100ML; MG/100ML; MG/100ML; MG/100ML
30 INJECTION, SOLUTION INTRAVENOUS CONTINUOUS PRN
Status: DISCONTINUED | OUTPATIENT
Start: 2021-05-20 | End: 2021-05-20 | Stop reason: HOSPADM

## 2021-05-20 RX ADMIN — LIDOCAINE HYDROCHLORIDE 60 MG: 20 INJECTION, SOLUTION INFILTRATION; PERINEURAL at 08:41

## 2021-05-20 RX ADMIN — GLYCOPYRROLATE 0.1 MG: 1 INJECTION INTRAMUSCULAR; INTRAVENOUS at 08:39

## 2021-05-20 RX ADMIN — PROPOFOL 80 MG: 10 INJECTION, EMULSION INTRAVENOUS at 08:41

## 2021-05-20 RX ADMIN — SODIUM CHLORIDE, POTASSIUM CHLORIDE, SODIUM LACTATE AND CALCIUM CHLORIDE 30 ML/HR: 600; 310; 30; 20 INJECTION, SOLUTION INTRAVENOUS at 08:04

## 2021-05-20 RX ADMIN — PHENYLEPHRINE HYDROCHLORIDE 100 MCG: 10 INJECTION INTRAVENOUS at 08:52

## 2021-05-20 RX ADMIN — PROPOFOL 140 MCG/KG/MIN: 10 INJECTION, EMULSION INTRAVENOUS at 08:41

## 2021-05-20 RX ADMIN — PHENYLEPHRINE HYDROCHLORIDE 100 MCG: 10 INJECTION INTRAVENOUS at 08:49

## 2021-05-20 NOTE — ANESTHESIA PREPROCEDURE EVALUATION
Anesthesia Evaluation     Patient summary reviewed and Nursing notes reviewed   NPO Solid Status: > 8 hours  NPO Liquid Status: > 2 hours           Airway   Mallampati: II  TM distance: >3 FB  Neck ROM: full  Dental - normal exam     Pulmonary - negative pulmonary ROS and normal exam    breath sounds clear to auscultation  Cardiovascular - normal exam    Patient on routine beta blocker  Rhythm: regular  Rate: normal    (+) hypertension 2 medications or greater, hyperlipidemia,   (-) angina, orthopnea, PND, SALCEDO      Neuro/Psych- negative ROS  GI/Hepatic/Renal/Endo    (+) obesity,  GERD,  renal disease,     Musculoskeletal     Abdominal    Substance History - negative use     OB/GYN negative ob/gyn ROS         Other   arthritis (RA (rheumatoid arthritis) ),                      Anesthesia Plan    ASA 3     MAC       Anesthetic plan, all risks, benefits, and alternatives have been provided, discussed and informed consent has been obtained with: patient.

## 2021-05-20 NOTE — ANESTHESIA POSTPROCEDURE EVALUATION
"Patient: Leann Andrew    Procedure Summary     Date: 05/20/21 Room / Location: [facility] ENDOSCOPY 4 /  [facility]    Anesthesia Start: 0836 Anesthesia Stop: 0905    Procedure: COLONOSCOPY INTO CECUM WITH COLD BIOPSY POLYPECTOMIES (N/A ) Diagnosis:       Screening for colon cancer      (Screening for colon cancer [Z12.11])    Surgeons: Chuy Bronson Jr., MD Provider: Audie Ramesh MD    Anesthesia Type: MAC ASA Status: 3          Anesthesia Type: MAC    Vitals  No vitals data found for the desired time range.          Post Anesthesia Care and Evaluation    Patient participation: waiting for patient participation  Level of consciousness: sleepy but conscious  Pain management: adequate  Airway patency: patent  Anesthetic complications: No anesthetic complications    Cardiovascular status: acceptable  Respiratory status: acceptable  Hydration status: acceptable    Comments: /55 (BP Location: Left arm, Patient Position: Sitting)   Pulse 62   Temp 36.8 °C (98.3 °F) (Oral)   Resp 18   Ht 160 cm (63\")   Wt 81.1 kg (178 lb 11.2 oz)   LMP  (LMP Unknown)   SpO2 99%   BMI 31.66 kg/m²         "

## 2021-05-21 LAB
LAB AP CASE REPORT: NORMAL
PATH REPORT.FINAL DX SPEC: NORMAL
PATH REPORT.GROSS SPEC: NORMAL

## 2021-05-27 ENCOUNTER — TELEPHONE (OUTPATIENT)
Dept: SURGERY | Facility: CLINIC | Age: 66
End: 2021-05-27

## 2021-05-27 NOTE — TELEPHONE ENCOUNTER
----- Message from Chuy Bronson Jr., MD sent at 5/25/2021  2:23 PM EDT -----  Please contact this patient to inform that the polyps are benign and a repeat colonoscopy is needed in 5 years.  Please place in recall for a colonoscopy in 5 years.  Thanks.

## 2021-06-14 ENCOUNTER — TRANSCRIBE ORDERS (OUTPATIENT)
Dept: ADMINISTRATIVE | Facility: HOSPITAL | Age: 66
End: 2021-06-14

## 2021-06-14 DIAGNOSIS — Z78.0 MENOPAUSE: Primary | ICD-10-CM

## 2021-06-16 ENCOUNTER — APPOINTMENT (OUTPATIENT)
Dept: BONE DENSITY | Facility: HOSPITAL | Age: 66
End: 2021-06-16

## 2021-06-16 DIAGNOSIS — Z78.0 MENOPAUSE: ICD-10-CM

## 2021-06-16 PROCEDURE — 77080 DXA BONE DENSITY AXIAL: CPT

## 2021-08-13 ENCOUNTER — HOSPITAL ENCOUNTER (OUTPATIENT)
Dept: MAMMOGRAPHY | Facility: HOSPITAL | Age: 66
Discharge: HOME | End: 2021-08-13
Admitting: OBSTETRICS & GYNECOLOGY

## 2021-08-13 DIAGNOSIS — Z12.31 VISIT FOR SCREENING MAMMOGRAM: ICD-10-CM

## 2021-08-13 PROCEDURE — 77063 BREAST TOMOSYNTHESIS BI: CPT

## 2021-08-13 PROCEDURE — 77067 SCR MAMMO BI INCL CAD: CPT

## 2022-07-18 ENCOUNTER — TRANSCRIBE ORDERS (OUTPATIENT)
Dept: ADMINISTRATIVE | Facility: HOSPITAL | Age: 67
End: 2022-07-18

## 2022-07-18 DIAGNOSIS — Z12.31 ENCOUNTER FOR SCREENING MAMMOGRAM FOR MALIGNANT NEOPLASM OF BREAST: Primary | ICD-10-CM

## 2022-08-15 ENCOUNTER — HOSPITAL ENCOUNTER (OUTPATIENT)
Dept: MAMMOGRAPHY | Facility: HOSPITAL | Age: 67
Discharge: HOME | End: 2022-08-15
Admitting: FAMILY MEDICINE

## 2022-08-15 DIAGNOSIS — Z12.31 ENCOUNTER FOR SCREENING MAMMOGRAM FOR MALIGNANT NEOPLASM OF BREAST: ICD-10-CM

## 2022-08-15 PROCEDURE — 77063 BREAST TOMOSYNTHESIS BI: CPT

## 2022-08-15 PROCEDURE — 77067 SCR MAMMO BI INCL CAD: CPT

## 2023-05-08 ENCOUNTER — OFFICE VISIT (OUTPATIENT)
Dept: OBSTETRICS AND GYNECOLOGY | Facility: CLINIC | Age: 68
End: 2023-05-08
Payer: MEDICARE

## 2023-05-08 VITALS
WEIGHT: 165 LBS | SYSTOLIC BLOOD PRESSURE: 118 MMHG | BODY MASS INDEX: 28.17 KG/M2 | HEIGHT: 64 IN | DIASTOLIC BLOOD PRESSURE: 68 MMHG

## 2023-05-08 DIAGNOSIS — Z78.0 ASYMPTOMATIC MENOPAUSE: ICD-10-CM

## 2023-05-08 DIAGNOSIS — Z01.419 ROUTINE GYNECOLOGICAL EXAMINATION: ICD-10-CM

## 2023-05-08 DIAGNOSIS — Z01.419 PAP SMEAR, LOW-RISK: Primary | ICD-10-CM

## 2023-05-08 DIAGNOSIS — Z12.31 ENCOUNTER FOR SCREENING MAMMOGRAM FOR MALIGNANT NEOPLASM OF BREAST: ICD-10-CM

## 2023-05-08 LAB
BILIRUB BLD-MCNC: NEGATIVE MG/DL
CLARITY, POC: CLEAR
COLOR UR: YELLOW
GLUCOSE UR STRIP-MCNC: NEGATIVE MG/DL
KETONES UR QL: NEGATIVE
LEUKOCYTE EST, POC: NEGATIVE
NITRITE UR-MCNC: NEGATIVE MG/ML
PH UR: 5 [PH] (ref 5–8)
PROT UR STRIP-MCNC: NEGATIVE MG/DL
RBC # UR STRIP: NEGATIVE /UL
SP GR UR: 1 (ref 1–1.03)
UROBILINOGEN UR QL: NORMAL

## 2023-05-08 NOTE — PROGRESS NOTES
GYN Annual Exam     CC- Here for annual exam.     Leann Andrew is a 67 y.o. female est pt who presents for annual well woman exam. She had BALTAZAR BSO at age 48 for endometriosis. No HRT, no  VB. No bladder issues. She has had a recent lumbar back strain and is now on a walker. She is seeing a spine surgeon and has an appt tomorrow.       OB History        0    Para   0    Term   0       0    AB   0    Living   0       SAB   0    IAB   0    Ectopic   0    Molar   0    Multiple   0    Live Births   0          Obstetric Comments   infertility             Menarche:11  Menopause:- BALTAZAR BSO endometriosis  HRT:none  Current contraception: post menopausal status  History of abnormal Pap smear: no  History of abnormal mammogram: no  Family history of uterine, colon or ovarian cancer: no  Family history of breast cancer: no  STD's: none  Last pap smear: 2021- normal pap  RODGER: none      Health Maintenance   Topic Date Due   • ZOSTER VACCINE (2 of 2) 10/27/2018   • Pneumococcal Vaccine 65+ (1 - PCV) Never done   • HEPATITIS C SCREENING  Never done   • ANNUAL WELLNESS VISIT  Never done   • LIPID PANEL  Never done   • DXA SCAN  2023   • INFLUENZA VACCINE  2023   • MAMMOGRAM  08/15/2024   • TDAP/TD VACCINES (2 - Td or Tdap) 2028   • COLORECTAL CANCER SCREENING  2031   • COVID-19 Vaccine  Completed       Past Medical History:   Diagnosis Date   • Allergic    • Anemia    • Cataract    • Chronic kidney disease    • DDD (degenerative disc disease), lumbar 2019   • Endometriosis    • Female infertility    • GERD (gastroesophageal reflux disease)    • Gout    • Hyperlipidemia    • Hypertension    • Low back pain    • RA (rheumatoid arthritis)        Past Surgical History:   Procedure Laterality Date   • COLONOSCOPY N/A 2021    Procedure: COLONOSCOPY INTO CECUM WITH COLD BIOPSY POLYPECTOMIES;  Surgeon: Chuy Bronson Jr., MD;  Location: [facility] ENDOSCOPY;  Service: General;   Laterality: N/A;  PRE- SCREENING  POST- POLYPS   • EPIDURAL BLOCK     • FOOT SURGERY Right     RHEUMATOID BONE SPURS REMOVED, GRAFT FROM KNEE   • HYSTERECTOMY           Current Outpatient Medications:   •  Acetaminophen (Tylenol) 325 MG capsule, Take 500 mg by mouth As Needed., Disp: , Rfl:   •  allopurinol (ZYLOPRIM) 100 MG tablet, Take 1 tablet by mouth 2 (Two) Times a Day., Disp: , Rfl:   •  amLODIPine (NORVASC) 10 MG tablet, Take 1 tablet by mouth Daily., Disp: , Rfl:   •  atorvastatin (LIPITOR) 10 MG tablet, Take 1 tablet by mouth Daily., Disp: , Rfl:   •  carvedilol (COREG) 25 MG tablet, Take 1 tablet by mouth 2 (Two) Times a Day With Meals., Disp: , Rfl:   •  Cholecalciferol (VITAMIN D3) 1000 units capsule, Take 2 capsules by mouth Daily., Disp: , Rfl:   •  ENBREL SURECLICK 50 MG/ML solution auto-injector, Inject 50 mg as directed., Disp: , Rfl:   •  folic acid (FOLVITE) 1 MG tablet, Take 400 mcg by mouth Daily. Takes 2 daily, Disp: , Rfl:   •  losartan (COZAAR) 50 MG tablet, Take 1 tablet by mouth Daily., Disp: , Rfl:   •  methotrexate 2.5 MG tablet, Take 1 tablet by mouth 1 (One) Time Per Week. 4 pills weekly, Disp: , Rfl:   •  SHINGRIX 50 MCG/0.5ML reconstituted suspension, , Disp: , Rfl:   •  triamterene-hydrochlorothiazide (MAXZIDE-25) 37.5-25 MG per tablet, Take 1 tablet by mouth Every Other Day., Disp: , Rfl:   •  aspirin 81 MG chewable tablet, Chew 81 mg Daily. (Patient not taking: Reported on 5/8/2023), Disp: , Rfl:     Allergies   Allergen Reactions   • Lisinopril Swelling and Cough       Social History     Tobacco Use   • Smoking status: Never   • Smokeless tobacco: Never   Vaping Use   • Vaping Use: Never used   Substance Use Topics   • Alcohol use: Yes     Comment: wine occasional   • Drug use: No       Family History   Problem Relation Age of Onset   • Breast cancer Neg Hx    • Ovarian cancer Neg Hx    • Uterine cancer Neg Hx    • Colon cancer Neg Hx    • Deep vein thrombosis Neg Hx    •  "Pulmonary embolism Neg Hx    • Malig Hyperthermia Neg Hx        Review of Systems   Constitutional: Positive for activity change. Negative for appetite change, fatigue, fever and unexpected weight change.   Respiratory: Negative for cough and shortness of breath.    Cardiovascular: Negative for chest pain and palpitations.   Gastrointestinal: Negative for abdominal distention, abdominal pain, constipation, diarrhea and nausea.   Endocrine: Negative for cold intolerance and heat intolerance.   Genitourinary: Negative for dyspareunia, dysuria, menstrual problem, pelvic pain, vaginal bleeding, vaginal discharge and vaginal pain.   Musculoskeletal: Positive for back pain, gait problem and myalgias.   Skin: Negative for color change and rash.   Neurological: Positive for weakness. Negative for headaches.   Psychiatric/Behavioral: Negative for dysphoric mood. The patient is not nervous/anxious.        /68   Ht 162.6 cm (64\")   Wt 74.8 kg (165 lb)   LMP  (LMP Unknown)   Breastfeeding No   BMI 28.32 kg/m²          Assessment/Plan    1) GYN HM: check pap, if normal, this can be her last pap smear.    SBE demonstrated and encouraged.  2) STD screening: declines Condoms encouraged.  3) Bone health - Weight bearing exercise, dietary calcium recommendations and vitamin D reviewed.   4) Diet and Exercise discussed  5) Smoking Status: No   6) Social: no issues  7)MMG:  UTD 8/2022, Birads 1. Repeat MG 8/2023  8) DEXA- UTD 6/2021 - osteopenia with LFOR 8 %. Repeat DEXA 8/2023  9)C scope-  UTD 5/20/2021,repeat in 5 years  10) Parts of this document have been copied or forwarded from her previous visits and have been reviewed, updated and edited as indicated.   11)Follow up prn and 2 years annual       Diagnoses and all orders for this visit:    1. Pap smear, low-risk (Primary)  -     POC Urinalysis Dipstick  -     Pap IG (Image Guided)    2. Routine gynecological examination  -     POC Urinalysis Dipstick  -     Pap IG " (Image Guided)    3. Encounter for screening mammogram for malignant neoplasm of breast  -     Mammo Screening Digital Tomosynthesis Bilateral With CAD; Future    4. Asymptomatic menopause  -     DEXA Bone Density Axial; Future        Anne-Marie Caitlin Solorzano MD  5/8/2023  10:16 EDT

## 2023-07-19 ENCOUNTER — APPOINTMENT (OUTPATIENT)
Dept: GENERAL RADIOLOGY | Facility: HOSPITAL | Age: 68
DRG: 457 | End: 2023-07-19
Payer: MEDICARE

## 2023-07-19 ENCOUNTER — HOSPITAL ENCOUNTER (INPATIENT)
Facility: HOSPITAL | Age: 68
LOS: 5 days | Discharge: NURSING, REHAB OR LONG-TERM CARE | DRG: 457 | End: 2023-07-24
Attending: ORTHOPAEDIC SURGERY | Admitting: ORTHOPAEDIC SURGERY
Payer: MEDICARE

## 2023-07-19 DIAGNOSIS — M48.04 THORACIC SPINAL STENOSIS: Primary | ICD-10-CM

## 2023-07-19 PROBLEM — M54.9 BACK PAIN: Status: ACTIVE | Noted: 2023-07-19

## 2023-07-19 PROBLEM — M41.50 DEGENERATIVE SCOLIOSIS IN ADULT PATIENT: Chronic | Status: ACTIVE | Noted: 2023-07-19

## 2023-07-19 PROBLEM — H26.9 BILATERAL CATARACTS: Status: ACTIVE | Noted: 2023-07-19

## 2023-07-19 PROBLEM — R01.1 HEART MURMUR: Status: ACTIVE | Noted: 2023-07-19

## 2023-07-19 PROBLEM — M85.80 OSTEOPENIA: Chronic | Status: ACTIVE | Noted: 2023-07-19

## 2023-07-19 PROBLEM — N18.9 CHRONIC KIDNEY DISEASE: Status: ACTIVE | Noted: 2023-07-19

## 2023-07-19 PROBLEM — M54.16 LUMBAR RADICULOPATHY, ACUTE: Status: ACTIVE | Noted: 2023-07-19

## 2023-07-19 PROBLEM — Z79.899 LONG-TERM USE OF HIGH-RISK MEDICATION: Status: ACTIVE | Noted: 2023-07-19

## 2023-07-19 PROBLEM — Z78.0 MENOPAUSE: Status: ACTIVE | Noted: 2023-07-19

## 2023-07-19 PROBLEM — G60.9 IDIOPATHIC NEUROPATHY: Chronic | Status: ACTIVE | Noted: 2023-07-19

## 2023-07-19 PROCEDURE — C1713 ANCHOR/SCREW BN/BN,TIS/BN: HCPCS | Performed by: ORTHOPAEDIC SURGERY

## 2023-07-19 PROCEDURE — 72070 X-RAY EXAM THORAC SPINE 2VWS: CPT

## 2023-07-19 PROCEDURE — 25010000002 VANCOMYCIN 1 G RECONSTITUTED SOLUTION: Performed by: ORTHOPAEDIC SURGERY

## 2023-07-19 PROCEDURE — 0 BUPIVACAINE (PF) 0.25 % SOLUTION: Performed by: ORTHOPAEDIC SURGERY

## 2023-07-19 PROCEDURE — 76000 FLUOROSCOPY <1 HR PHYS/QHP: CPT

## 2023-07-19 PROCEDURE — 25010000002 HYDROMORPHONE PER 4 MG: Performed by: NURSE ANESTHETIST, CERTIFIED REGISTERED

## 2023-07-19 PROCEDURE — 0RG6071 FUSION OF THORACIC VERTEBRAL JOINT WITH AUTOLOGOUS TISSUE SUBSTITUTE, POSTERIOR APPROACH, POSTERIOR COLUMN, OPEN APPROACH: ICD-10-PCS | Performed by: ORTHOPAEDIC SURGERY

## 2023-07-19 PROCEDURE — 25010000002 CEFAZOLIN IN DEXTROSE 2-4 GM/100ML-% SOLUTION: Performed by: ORTHOPAEDIC SURGERY

## 2023-07-19 DEVICE — TI LORDOTIC ROD, 5.5 MM X 40 MM
Type: IMPLANTABLE DEVICE | Site: SPINE THORACIC | Status: FUNCTIONAL
Brand: INVICTUS

## 2023-07-19 DEVICE — POLYAXIAL SCREW, 5.5 MM X 40 MM
Type: IMPLANTABLE DEVICE | Site: SPINE THORACIC | Status: FUNCTIONAL
Brand: INVICTUS

## 2023-07-19 DEVICE — FLOSEAL HEMOSTATIC MATRIX, 10ML
Type: IMPLANTABLE DEVICE | Site: SPINE THORACIC | Status: FUNCTIONAL
Brand: FLOSEAL HEMOSTATIC MATRIX

## 2023-07-19 DEVICE — SET SCREW
Type: IMPLANTABLE DEVICE | Site: SPINE THORACIC | Status: FUNCTIONAL
Brand: INVICTUS

## 2023-07-19 DEVICE — KNOTLESS TISSUE CONTROL DEVICE, VIOLET UNIDIRECTIONAL (ANTIBACTERIAL) SYNTHETIC ABSORBABLE DEVICE
Type: IMPLANTABLE DEVICE | Site: SPINE THORACIC | Status: FUNCTIONAL
Brand: STRATAFIX

## 2023-07-19 DEVICE — I-FACTOR™ PUTTY, 5.0 CC SYRINGE
Type: IMPLANTABLE DEVICE | Site: SPINE THORACIC | Status: FUNCTIONAL
Brand: I-FACTOR™ PEPTIDE ENHANCED BONE GRAFT

## 2023-07-19 DEVICE — POLYAXIAL SCREW, 5.5 MM X 45 MM
Type: IMPLANTABLE DEVICE | Site: SPINE THORACIC | Status: FUNCTIONAL
Brand: INVICTUS

## 2023-07-19 RX ORDER — SODIUM CHLORIDE 9 MG/ML
INJECTION, SOLUTION INTRAVENOUS AS NEEDED
Status: DISCONTINUED | OUTPATIENT
Start: 2023-07-19 | End: 2023-07-19 | Stop reason: HOSPADM

## 2023-07-19 RX ORDER — FENTANYL CITRATE 50 UG/ML
50 INJECTION, SOLUTION INTRAMUSCULAR; INTRAVENOUS ONCE AS NEEDED
Status: COMPLETED | OUTPATIENT
Start: 2023-07-19 | End: 2023-07-19

## 2023-07-19 RX ORDER — LOSARTAN POTASSIUM 50 MG/1
50 TABLET ORAL NIGHTLY
Status: DISCONTINUED | OUTPATIENT
Start: 2023-07-19 | End: 2023-07-24 | Stop reason: HOSPADM

## 2023-07-19 RX ORDER — FAMOTIDINE 10 MG/ML
20 INJECTION, SOLUTION INTRAVENOUS ONCE
Status: COMPLETED | OUTPATIENT
Start: 2023-07-19 | End: 2023-07-19

## 2023-07-19 RX ORDER — MORPHINE SULFATE 2 MG/ML
1 INJECTION, SOLUTION INTRAMUSCULAR; INTRAVENOUS EVERY 4 HOURS PRN
Status: DISCONTINUED | OUTPATIENT
Start: 2023-07-19 | End: 2023-07-24 | Stop reason: HOSPADM

## 2023-07-19 RX ORDER — CEFAZOLIN SODIUM 2 G/100ML
2000 INJECTION, SOLUTION INTRAVENOUS ONCE
Status: COMPLETED | OUTPATIENT
Start: 2023-07-19 | End: 2023-07-19

## 2023-07-19 RX ORDER — SODIUM CHLORIDE 9 MG/ML
40 INJECTION, SOLUTION INTRAVENOUS AS NEEDED
Status: DISCONTINUED | OUTPATIENT
Start: 2023-07-19 | End: 2023-07-24 | Stop reason: HOSPADM

## 2023-07-19 RX ORDER — SODIUM CHLORIDE 0.9 % (FLUSH) 0.9 %
10 SYRINGE (ML) INJECTION AS NEEDED
Status: DISCONTINUED | OUTPATIENT
Start: 2023-07-19 | End: 2023-07-24 | Stop reason: HOSPADM

## 2023-07-19 RX ORDER — TRIAMTERENE AND HYDROCHLOROTHIAZIDE 37.5; 25 MG/1; MG/1
1 TABLET ORAL EVERY OTHER DAY
Status: DISCONTINUED | OUTPATIENT
Start: 2023-07-19 | End: 2023-07-24 | Stop reason: HOSPADM

## 2023-07-19 RX ORDER — MIDAZOLAM HYDROCHLORIDE 1 MG/ML
0.5 INJECTION INTRAMUSCULAR; INTRAVENOUS
Status: DISCONTINUED | OUTPATIENT
Start: 2023-07-19 | End: 2023-07-19 | Stop reason: HOSPADM

## 2023-07-19 RX ORDER — POLYETHYLENE GLYCOL 3350 17 G/17G
17 POWDER, FOR SOLUTION ORAL DAILY PRN
Status: DISCONTINUED | OUTPATIENT
Start: 2023-07-19 | End: 2023-07-24 | Stop reason: HOSPADM

## 2023-07-19 RX ORDER — DROPERIDOL 2.5 MG/ML
0.62 INJECTION, SOLUTION INTRAMUSCULAR; INTRAVENOUS
Status: DISCONTINUED | OUTPATIENT
Start: 2023-07-19 | End: 2023-07-19 | Stop reason: HOSPADM

## 2023-07-19 RX ORDER — SODIUM CHLORIDE 0.9 % (FLUSH) 0.9 %
10 SYRINGE (ML) INJECTION EVERY 12 HOURS SCHEDULED
Status: DISCONTINUED | OUTPATIENT
Start: 2023-07-19 | End: 2023-07-24 | Stop reason: HOSPADM

## 2023-07-19 RX ORDER — HYDRALAZINE HYDROCHLORIDE 20 MG/ML
5 INJECTION INTRAMUSCULAR; INTRAVENOUS
Status: DISCONTINUED | OUTPATIENT
Start: 2023-07-19 | End: 2023-07-19 | Stop reason: HOSPADM

## 2023-07-19 RX ORDER — HYDROCODONE BITARTRATE AND ACETAMINOPHEN 7.5; 325 MG/1; MG/1
1 TABLET ORAL ONCE AS NEEDED
Status: DISCONTINUED | OUTPATIENT
Start: 2023-07-19 | End: 2023-07-19 | Stop reason: HOSPADM

## 2023-07-19 RX ORDER — ONDANSETRON 2 MG/ML
4 INJECTION INTRAMUSCULAR; INTRAVENOUS ONCE AS NEEDED
Status: DISCONTINUED | OUTPATIENT
Start: 2023-07-19 | End: 2023-07-19 | Stop reason: HOSPADM

## 2023-07-19 RX ORDER — CHOLECALCIFEROL (VITAMIN D3) 125 MCG
5 CAPSULE ORAL NIGHTLY PRN
Status: DISCONTINUED | OUTPATIENT
Start: 2023-07-19 | End: 2023-07-24 | Stop reason: HOSPADM

## 2023-07-19 RX ORDER — CEFAZOLIN SODIUM 2 G/100ML
2000 INJECTION, SOLUTION INTRAVENOUS EVERY 8 HOURS
Status: COMPLETED | OUTPATIENT
Start: 2023-07-19 | End: 2023-07-20

## 2023-07-19 RX ORDER — DIPHENHYDRAMINE HYDROCHLORIDE 50 MG/ML
12.5 INJECTION INTRAMUSCULAR; INTRAVENOUS
Status: DISCONTINUED | OUTPATIENT
Start: 2023-07-19 | End: 2023-07-19 | Stop reason: HOSPADM

## 2023-07-19 RX ORDER — DOCUSATE SODIUM 100 MG/1
100 CAPSULE, LIQUID FILLED ORAL 2 TIMES DAILY PRN
Status: DISCONTINUED | OUTPATIENT
Start: 2023-07-19 | End: 2023-07-24 | Stop reason: HOSPADM

## 2023-07-19 RX ORDER — FLUMAZENIL 0.1 MG/ML
0.2 INJECTION INTRAVENOUS AS NEEDED
Status: DISCONTINUED | OUTPATIENT
Start: 2023-07-19 | End: 2023-07-19 | Stop reason: HOSPADM

## 2023-07-19 RX ORDER — EPHEDRINE SULFATE 50 MG/ML
5 INJECTION, SOLUTION INTRAVENOUS ONCE AS NEEDED
Status: DISCONTINUED | OUTPATIENT
Start: 2023-07-19 | End: 2023-07-19 | Stop reason: HOSPADM

## 2023-07-19 RX ORDER — HYDROCODONE BITARTRATE AND ACETAMINOPHEN 5; 325 MG/1; MG/1
1 TABLET ORAL EVERY 4 HOURS PRN
Status: DISCONTINUED | OUTPATIENT
Start: 2023-07-19 | End: 2023-07-24 | Stop reason: HOSPADM

## 2023-07-19 RX ORDER — PROMETHAZINE HYDROCHLORIDE 25 MG/1
25 TABLET ORAL ONCE AS NEEDED
Status: DISCONTINUED | OUTPATIENT
Start: 2023-07-19 | End: 2023-07-19 | Stop reason: HOSPADM

## 2023-07-19 RX ORDER — NALOXONE HCL 0.4 MG/ML
0.4 VIAL (ML) INJECTION
Status: DISCONTINUED | OUTPATIENT
Start: 2023-07-19 | End: 2023-07-24 | Stop reason: HOSPADM

## 2023-07-19 RX ORDER — BISACODYL 10 MG
10 SUPPOSITORY, RECTAL RECTAL DAILY PRN
Status: DISCONTINUED | OUTPATIENT
Start: 2023-07-19 | End: 2023-07-24 | Stop reason: HOSPADM

## 2023-07-19 RX ORDER — ACETAMINOPHEN 325 MG/1
650 TABLET ORAL EVERY 8 HOURS
Status: DISPENSED | OUTPATIENT
Start: 2023-07-19 | End: 2023-07-21

## 2023-07-19 RX ORDER — SODIUM CHLORIDE 0.9 % (FLUSH) 0.9 %
3 SYRINGE (ML) INJECTION EVERY 12 HOURS SCHEDULED
Status: DISCONTINUED | OUTPATIENT
Start: 2023-07-19 | End: 2023-07-19 | Stop reason: HOSPADM

## 2023-07-19 RX ORDER — ONDANSETRON 2 MG/ML
4 INJECTION INTRAMUSCULAR; INTRAVENOUS EVERY 6 HOURS PRN
Status: DISCONTINUED | OUTPATIENT
Start: 2023-07-19 | End: 2023-07-24 | Stop reason: HOSPADM

## 2023-07-19 RX ORDER — BUPIVACAINE HYDROCHLORIDE 2.5 MG/ML
INJECTION, SOLUTION EPIDURAL; INFILTRATION; INTRACAUDAL AS NEEDED
Status: DISCONTINUED | OUTPATIENT
Start: 2023-07-19 | End: 2023-07-19 | Stop reason: HOSPADM

## 2023-07-19 RX ORDER — SODIUM CHLORIDE, SODIUM LACTATE, POTASSIUM CHLORIDE, CALCIUM CHLORIDE 600; 310; 30; 20 MG/100ML; MG/100ML; MG/100ML; MG/100ML
9 INJECTION, SOLUTION INTRAVENOUS CONTINUOUS
Status: DISCONTINUED | OUTPATIENT
Start: 2023-07-19 | End: 2023-07-22

## 2023-07-19 RX ORDER — NALOXONE HCL 0.4 MG/ML
0.2 VIAL (ML) INJECTION AS NEEDED
Status: DISCONTINUED | OUTPATIENT
Start: 2023-07-19 | End: 2023-07-19 | Stop reason: HOSPADM

## 2023-07-19 RX ORDER — IPRATROPIUM BROMIDE AND ALBUTEROL SULFATE 2.5; .5 MG/3ML; MG/3ML
3 SOLUTION RESPIRATORY (INHALATION) ONCE AS NEEDED
Status: DISCONTINUED | OUTPATIENT
Start: 2023-07-19 | End: 2023-07-19 | Stop reason: HOSPADM

## 2023-07-19 RX ORDER — AMLODIPINE BESYLATE 10 MG/1
10 TABLET ORAL DAILY
Status: DISCONTINUED | OUTPATIENT
Start: 2023-07-20 | End: 2023-07-24 | Stop reason: HOSPADM

## 2023-07-19 RX ORDER — LIDOCAINE HYDROCHLORIDE 10 MG/ML
0.5 INJECTION, SOLUTION EPIDURAL; INFILTRATION; INTRACAUDAL; PERINEURAL ONCE AS NEEDED
Status: DISCONTINUED | OUTPATIENT
Start: 2023-07-19 | End: 2023-07-19 | Stop reason: HOSPADM

## 2023-07-19 RX ORDER — ATORVASTATIN CALCIUM 20 MG/1
10 TABLET, FILM COATED ORAL DAILY
Status: DISCONTINUED | OUTPATIENT
Start: 2023-07-19 | End: 2023-07-24 | Stop reason: HOSPADM

## 2023-07-19 RX ORDER — AMOXICILLIN 250 MG
1 CAPSULE ORAL NIGHTLY PRN
Status: DISCONTINUED | OUTPATIENT
Start: 2023-07-19 | End: 2023-07-24 | Stop reason: HOSPADM

## 2023-07-19 RX ORDER — OXYCODONE AND ACETAMINOPHEN 7.5; 325 MG/1; MG/1
1 TABLET ORAL EVERY 4 HOURS PRN
Status: DISCONTINUED | OUTPATIENT
Start: 2023-07-19 | End: 2023-07-19 | Stop reason: HOSPADM

## 2023-07-19 RX ORDER — HYDROMORPHONE HYDROCHLORIDE 1 MG/ML
0.5 INJECTION, SOLUTION INTRAMUSCULAR; INTRAVENOUS; SUBCUTANEOUS
Status: DISCONTINUED | OUTPATIENT
Start: 2023-07-19 | End: 2023-07-24 | Stop reason: HOSPADM

## 2023-07-19 RX ORDER — HYDROMORPHONE HYDROCHLORIDE 1 MG/ML
0.5 INJECTION, SOLUTION INTRAMUSCULAR; INTRAVENOUS; SUBCUTANEOUS
Status: DISCONTINUED | OUTPATIENT
Start: 2023-07-19 | End: 2023-07-19 | Stop reason: HOSPADM

## 2023-07-19 RX ORDER — HYDROCODONE BITARTRATE AND ACETAMINOPHEN 10; 325 MG/1; MG/1
1 TABLET ORAL EVERY 4 HOURS PRN
Status: DISCONTINUED | OUTPATIENT
Start: 2023-07-19 | End: 2023-07-24 | Stop reason: HOSPADM

## 2023-07-19 RX ORDER — LABETALOL HYDROCHLORIDE 5 MG/ML
5 INJECTION, SOLUTION INTRAVENOUS
Status: DISCONTINUED | OUTPATIENT
Start: 2023-07-19 | End: 2023-07-19 | Stop reason: HOSPADM

## 2023-07-19 RX ORDER — FENTANYL CITRATE 50 UG/ML
50 INJECTION, SOLUTION INTRAMUSCULAR; INTRAVENOUS
Status: DISCONTINUED | OUTPATIENT
Start: 2023-07-19 | End: 2023-07-19 | Stop reason: HOSPADM

## 2023-07-19 RX ORDER — SODIUM CHLORIDE 0.9 % (FLUSH) 0.9 %
3-10 SYRINGE (ML) INJECTION AS NEEDED
Status: DISCONTINUED | OUTPATIENT
Start: 2023-07-19 | End: 2023-07-19 | Stop reason: HOSPADM

## 2023-07-19 RX ORDER — VANCOMYCIN HYDROCHLORIDE 1 G/20ML
INJECTION, POWDER, LYOPHILIZED, FOR SOLUTION INTRAVENOUS AS NEEDED
Status: DISCONTINUED | OUTPATIENT
Start: 2023-07-19 | End: 2023-07-19 | Stop reason: HOSPADM

## 2023-07-19 RX ORDER — CARVEDILOL 25 MG/1
25 TABLET ORAL 2 TIMES DAILY WITH MEALS
Status: DISCONTINUED | OUTPATIENT
Start: 2023-07-19 | End: 2023-07-24 | Stop reason: HOSPADM

## 2023-07-19 RX ORDER — BISACODYL 5 MG/1
5 TABLET, DELAYED RELEASE ORAL DAILY PRN
Status: DISCONTINUED | OUTPATIENT
Start: 2023-07-19 | End: 2023-07-24 | Stop reason: HOSPADM

## 2023-07-19 RX ORDER — PROMETHAZINE HYDROCHLORIDE 25 MG/1
25 SUPPOSITORY RECTAL ONCE AS NEEDED
Status: DISCONTINUED | OUTPATIENT
Start: 2023-07-19 | End: 2023-07-19 | Stop reason: HOSPADM

## 2023-07-19 RX ADMIN — HYDROMORPHONE HYDROCHLORIDE 0.5 MG: 1 INJECTION, SOLUTION INTRAMUSCULAR; INTRAVENOUS; SUBCUTANEOUS at 14:15

## 2023-07-19 RX ADMIN — SODIUM CHLORIDE, POTASSIUM CHLORIDE, SODIUM LACTATE AND CALCIUM CHLORIDE 9 ML/HR: 600; 310; 30; 20 INJECTION, SOLUTION INTRAVENOUS at 07:15

## 2023-07-19 RX ADMIN — CARVEDILOL 25 MG: 25 TABLET, FILM COATED ORAL at 17:55

## 2023-07-19 RX ADMIN — ATORVASTATIN CALCIUM 10 MG: 20 TABLET, FILM COATED ORAL at 16:40

## 2023-07-19 RX ADMIN — LOSARTAN POTASSIUM 50 MG: 50 TABLET, FILM COATED ORAL at 21:44

## 2023-07-19 RX ADMIN — HYDROCODONE BITARTRATE AND ACETAMINOPHEN 1 TABLET: 10; 325 TABLET ORAL at 18:52

## 2023-07-19 RX ADMIN — HYDROCODONE BITARTRATE AND ACETAMINOPHEN 1 TABLET: 10; 325 TABLET ORAL at 23:03

## 2023-07-19 RX ADMIN — CEFAZOLIN SODIUM 2000 MG: 2 INJECTION, SOLUTION INTRAVENOUS at 18:52

## 2023-07-19 RX ADMIN — FAMOTIDINE 20 MG: 10 INJECTION INTRAVENOUS at 07:15

## 2023-07-19 RX ADMIN — Medication 10 ML: at 21:45

## 2023-07-19 RX ADMIN — ACETAMINOPHEN 650 MG: 325 TABLET, FILM COATED ORAL at 16:40

## 2023-07-19 RX ADMIN — HYDROCODONE BITARTRATE AND ACETAMINOPHEN 1 TABLET: 10; 325 TABLET ORAL at 14:45

## 2023-07-19 RX ADMIN — CEFAZOLIN SODIUM 2000 MG: 2 INJECTION, SOLUTION INTRAVENOUS at 07:22

## 2023-07-19 NOTE — OP NOTE
THORACIC DECOMPRESSION POSTERIOR FUSION WITH INSTRUMENTATION  Procedure Report    Patient Name:  Leann Andrew  YOB: 1955    Date of Surgery:  7/19/2023     Indications: This is a 67-year-old female who presented to me with low back and leg pain but with balance and coordination issues that have progressed over the last several months.  She actually has become dependent on a walker.  Imaging work-up showed severe stenosis with cord edema at T11-T12.  We discussed treatment options and alternatives.  We discussed the diagnosis of thoracic myelopathy and the natural history of this condition and best available evidence.  We discussed with her the goals of the surgery would be to prevent this from progressing.  Options were discussed with her.  Surgical options were also discussed.  A surgical decompression and fusion at T11 and T12 was offered to the patient and she wanted to proceed.  Risks of the surgery were discussed and include but are not limited to risk of anesthesia including heart attack stroke and even death as well as risk of the procedure including paralysis, spinal fluid leak, screw misplacement or malposition, pseudoarthrosis, adjacent level degenerative changes, infection, continued pain, blood clot formation, epidural hematoma, and potential need for reoperation.  She understood these risks and agreed to proceed.  Informed consent was signed.    Pre-op Diagnosis:   Spinal stenosis at T11 and T12 with thoracic myelopathy       Post-Op Diagnosis Codes:   Spinal stenosis at T11 and T12 with thoracic myelopathy    Procedure/CPT® Codes:      Procedure(s):  T11-12 LAMINECTOMY FUSION          Staff:  Surgeon(s):  Smith Montgomery MD         Anesthesia: General    Estimated Blood Loss: 300 mL    Implants:    Implant Name Type Inv. Item Serial No.  Lot No. LRB No. Used Action   KT SEAL HEMOS ABS FLOSEAL MATRX FAST/PREP 10ML - [id number] Implant KT SEAL HEMOS ABS FLOSEAL MATRX  FAST/PREP 10ML  [email or web address] [id number] N/A 1 Implanted   GRFT BONE IFACTOR PUTTY 5ML - [id number] Implant GRFT BONE IFACTOR PUTTY 5ML  [employer] [id number] N/A 1 Implanted   DEV CONTRL TISS STRATAFIXSPIRALMNCRYL PLSPS2 REV3/0 30CM - [id number] Implant DEV CONTRL TISS STRATAFIXSPIRALMNCRYL PLSPS2 REV3/0 30CM  [employer] [id number] N/A 1 Implanted   SCRW ST KODIAK - [id number] Implant SCRW ST KODIAK  ALPHATEC SPINE NR N/A 4 Implanted   SCRW PA KODIAK 5.5X45MM - [id number] Implant SCRW PA KODIAK 5.5X45MM  ALPHATEC SPINE NR N/A 2 Implanted   SCRW PA KODIAK 5.5X40MM - [id number] Implant SCRW PA KODIAK 5.5X40MM  ALPHATEC SPINE NR N/A 2 Implanted   PEGGY LRD SPINE KODIAK/INVICTUS TI 5.5X40MM - [id number] Implant PEGGY LRD SPINE KODIAK/INVICTUS TI 5.5X40MM  ALPHATEC SPINE NR N/A 2 Implanted       Specimen:          None        Findings: As above        Description of Procedure: The patient was notified in the preoperative holding area.  Surgical site was marked.  Patient was brought back to operative suite by anesthesia.  General endotracheal anesthesia was induced without difficulty.  Neuro monitoring leads were attached to the patient.  A baseline motor was then performed.  The patient was then placed prone on a Juvenal frame.  All bony prominences were well-padded.  Timeout was performed identifying patient characteristics.  Preoperative antibiotics were given.  The surgical site was then prepped in usual sterile manner.  A spinal needle was placed in the subcutaneous fat to localize the incision.  A lateral C arm image confirmed placement of the needle.  Patient was then draped in usual sterile manner.  Neuro monitoring was used during the case including motor evoked potential, somatosensory evoked potentials, and EMGs.  The signals were stable throughout the case.  An incision was made in accordance with our localizing x-ray along the midline using a 10 blade.  Bovie electrocautery was used to dissect the subcu  layer.  The fascia was incised.  Paramidline dissection was carried out bilaterally exposing the T11 and T12 lamina.  Dissection was carried out to the transverse processes.  We then placed a Kocher clamp between the spinous processes of T11 and T12 and verified its position under a lateral portable x-ray.  We then began instrumenting using anatomic landmarks pedicle screws were placed at the T12 and T11 vertebra bilaterally.  This was done in the following manner.  We used a high-speed bur to break through the outer cortex once the anatomic landmarks were identified.  We then used a Jayna all to cannulate the pedicle.  We started first on the right at T12 and we verified again we are at appropriate level under lateral portable x-ray.  We then used fluoroscopy to aid in positioning of the instrumentation.  Once the awl was used to cannulate the pedicle a ball-tipped probe was used to sound the pedicle ensuring that there were 4 walls and there was no medial lateral breaches.  We then inserted Alphatec and Davy diss 5.5 x 45 mm pedicle screws at T12 and 5.5 x 40 mm pedicle screws at T11.  We verify that the pedicle screws were appropriately placed under AP and lateral C-arm fluoroscopy.  We also stimulated each pedicle screw and verified that action potentials were stable at 20 mA.  We next began the decompression portion of the case.  Using a combination of rongeur, Kerrison rongeurs, and the high-speed bur the T12 and T11 interspace was identified and decompression was carried down to the lamina of T12 and T11.  The lamina was thinned using high-speed bur and Kerrison rongeurs were used to remove the central portion of the lamina and working our way to the lateral gutters.  Ligamentum flavum was identified and removed from its attachments.  The dura was then identified and was verified to be well constituted and well decompressed from about the pedicle level of T12 to the pedicle level of T11.  We then irrigated  with copious amounts normal saline.  Hemostasis was achieved using bipolar electrocautery as well as Floseal.  Hemovac drain was then placed.  Vancomycin powder was then placed.  We we decorticated the facets bilaterally at T11 and T12 using a high-speed bur.  We placed local autograft bone that was preserved from the decompression as well as I factor allograft bone graft.  This was placed laterally.  5.5 x 40 mm rods were then placed in the screw heads and setscrews were applied and torqued into place.  This was done bilaterally. Final C arm imaging was then taken to verify that the construct was appropriately placed.  We again irrigated with copious amounts of normal saline.  Local anesthetic and a a layered closure was then applied.  A sterile dressing was applied.    The patient was then placed supine back on the hospital bed. She was awoken from anesthesia and brought back to the recovery area in stable condition having tolerated the procedure well.  She will be admitted to the floor postoperatively.  She will receive 24 hours of IV antibiotics.  She will receive SCDs for DVT prophylaxis.                      Smith Montgomery MD     Date: 7/19/2023  Time: 13:49 EDT

## 2023-07-19 NOTE — H&P
Patient Care Team:  Nick Erwin MD as PCP - General (Family Medicine)    Chief complaint back pain/balance coordination issues    Subjective     Patient is a 67 y.o. female presents with low back pain and balance weakness.  She has thoracic myelopathy with cord compression at T11-12.    Review of Systems   Pertinent items are noted in HPI    History  Past Medical History:   Diagnosis Date    Anemia     Back pain     NUMBNESS/ TINGLINGPAIN DOWN BILAT LOWER EXTREMITIES    Cataract     Chronic kidney disease     STAGE III - FOLLOWED ONLY BY PCP    DDD (degenerative disc disease), lumbar     Female infertility     Gout     Heart murmur     NO PROBLEMS    Hyperlipidemia     Hypertension     RA (rheumatoid arthritis)     NO PROBLEMS WITH FLEX/ EXTENSION     Past Surgical History:   Procedure Laterality Date    COLONOSCOPY N/A 5/20/2021    Procedure: COLONOSCOPY INTO CECUM WITH COLD BIOPSY POLYPECTOMIES;  Surgeon: Chuy Bronson Jr., MD;  Location: [facility] ENDOSCOPY;  Service: General;  Laterality: N/A;  PRE- SCREENING  POST- POLYPS    EPIDURAL BLOCK      FOOT SURGERY Right     RHEUMATOID BONE SPURS REMOVED, GRAFT FROM KNEE    HYSTERECTOMY       Family History   Problem Relation Age of Onset    Breast cancer Neg Hx     Ovarian cancer Neg Hx     Uterine cancer Neg Hx     Colon cancer Neg Hx     Deep vein thrombosis Neg Hx     Pulmonary embolism Neg Hx     Malig Hyperthermia Neg Hx      Social History     Tobacco Use    Smoking status: Never    Smokeless tobacco: Never   Vaping Use    Vaping Use: Never used   Substance Use Topics    Alcohol use: Yes     Comment: wine occasional    Drug use: No       Objective     Vital Signs  Temp:  [98.4 °F (36.9 °C)] 98.4 °F (36.9 °C)  Heart Rate:  [72] 72  Resp:  [16] 16  BP: (118)/(68) 118/68    Physical Exam:    Neuro status is stable              Breathing non labored              Skin intact at surgical site    Results Review:    reviewed    Assessment & Plan      Thoracic spinal stenosis with myelopathy  Plan for decompression and fusion T11-T12  Risks/benefits/alternatives discusssed        I discussed the patients findings and my recommendations with patient and family.     Smith Montgomery MD  07/19/23  07:18 EDT

## 2023-07-19 NOTE — PROGRESS NOTES
"    Procedure(s):  T11-12 LAMINECTOMY FUSION     LOS: 0 days     Subjective :   Pain controlled.  Resting in recovery area    Objective :    Vital signs in last 24 hours:  Vitals:    07/19/23 1450 07/19/23 1530 07/19/23 1639 07/19/23 1710   BP: 111/61 110/64 110/66 110/66   BP Location:  Right arm Left arm Left arm   Patient Position:  Lying Lying Lying   Pulse: 75 72 72 65   Resp:  16 16 16   Temp:  97 °F (36.1 °C) 97.6 °F (36.4 °C) 97.7 °F (36.5 °C)   TempSrc:  Oral Oral Oral   SpO2: 96% 98% 94% 98%   Weight:  70.8 kg (156 lb 1.4 oz)     Height:  162.6 cm (64.02\")         PHYSICAL EXAM:  Patient is calm, in no acute distress, awake and oriented x 3.  .  Patient is neurovascularly intact distally.    LABS:                  ASSESSMENT:  Status post Procedure(s):  T11-12 LAMINECTOMY FUSION      Spinal Surgery Levels Completed:1 Level     Plan:  Admit to floor  PT/OT  SCDs for DVT ppx  Pain control - IV and oral meds  Dispo - home d/c vs rehab pending PT leanna Montgomery MD    Date: 7/19/2023  Time: 17:23 EDT    "

## 2023-07-20 LAB
ALBUMIN SERPL-MCNC: 3.9 G/DL (ref 3.5–5.2)
ALBUMIN/GLOB SERPL: 2.3 G/DL
ALP SERPL-CCNC: 57 U/L (ref 39–117)
ALT SERPL W P-5'-P-CCNC: 13 U/L (ref 1–33)
ANION GAP SERPL CALCULATED.3IONS-SCNC: 11 MMOL/L (ref 5–15)
AST SERPL-CCNC: 23 U/L (ref 1–32)
BASOPHILS # BLD AUTO: 0 10*3/MM3 (ref 0–0.2)
BASOPHILS NFR BLD AUTO: 0 % (ref 0–1.5)
BILIRUB SERPL-MCNC: 0.2 MG/DL (ref 0–1.2)
BUN SERPL-MCNC: 12 MG/DL (ref 8–23)
BUN/CREAT SERPL: 12.9 (ref 7–25)
CALCIUM SPEC-SCNC: 9.2 MG/DL (ref 8.6–10.5)
CHLORIDE SERPL-SCNC: 108 MMOL/L (ref 98–107)
CO2 SERPL-SCNC: 22 MMOL/L (ref 22–29)
CREAT SERPL-MCNC: 0.93 MG/DL (ref 0.57–1)
DEPRECATED RDW RBC AUTO: 54.6 FL (ref 37–54)
EGFRCR SERPLBLD CKD-EPI 2021: 66.7 ML/MIN/1.73
EOSINOPHIL # BLD AUTO: 0 10*3/MM3 (ref 0–0.4)
EOSINOPHIL NFR BLD AUTO: 0 % (ref 0.3–6.2)
ERYTHROCYTE [DISTWIDTH] IN BLOOD BY AUTOMATED COUNT: 14.1 % (ref 12.3–15.4)
GLOBULIN UR ELPH-MCNC: 1.7 GM/DL
GLUCOSE SERPL-MCNC: 141 MG/DL (ref 65–99)
HCT VFR BLD AUTO: 24.2 % (ref 34–46.6)
HGB BLD-MCNC: 8.7 G/DL (ref 12–15.9)
LYMPHOCYTES # BLD AUTO: 0.43 10*3/MM3 (ref 0.7–3.1)
LYMPHOCYTES NFR BLD AUTO: 7.3 % (ref 19.6–45.3)
MCH RBC QN AUTO: 38.7 PG (ref 26.6–33)
MCHC RBC AUTO-ENTMCNC: 36 G/DL (ref 31.5–35.7)
MCV RBC AUTO: 107.6 FL (ref 79–97)
MONOCYTES # BLD AUTO: 0.51 10*3/MM3 (ref 0.1–0.9)
MONOCYTES NFR BLD AUTO: 8.6 % (ref 5–12)
NEUTROPHILS NFR BLD AUTO: 4.93 10*3/MM3 (ref 1.7–7)
NEUTROPHILS NFR BLD AUTO: 83.6 % (ref 42.7–76)
PLATELET # BLD AUTO: 153 10*3/MM3 (ref 140–450)
PMV BLD AUTO: 10.1 FL (ref 6–12)
POTASSIUM SERPL-SCNC: 3.6 MMOL/L (ref 3.5–5.2)
PROT SERPL-MCNC: 5.6 G/DL (ref 6–8.5)
RBC # BLD AUTO: 2.25 10*6/MM3 (ref 3.77–5.28)
SODIUM SERPL-SCNC: 141 MMOL/L (ref 136–145)
VIT B12 BLD-MCNC: 326 PG/ML (ref 211–946)
WBC NRBC COR # BLD: 5.9 10*3/MM3 (ref 3.4–10.8)

## 2023-07-20 PROCEDURE — 97530 THERAPEUTIC ACTIVITIES: CPT

## 2023-07-20 PROCEDURE — 80053 COMPREHEN METABOLIC PANEL: CPT | Performed by: INTERNAL MEDICINE

## 2023-07-20 PROCEDURE — 25010000002 CEFAZOLIN IN DEXTROSE 2-4 GM/100ML-% SOLUTION: Performed by: ORTHOPAEDIC SURGERY

## 2023-07-20 PROCEDURE — 97166 OT EVAL MOD COMPLEX 45 MIN: CPT

## 2023-07-20 PROCEDURE — 82607 VITAMIN B-12: CPT | Performed by: INTERNAL MEDICINE

## 2023-07-20 PROCEDURE — 97161 PT EVAL LOW COMPLEX 20 MIN: CPT

## 2023-07-20 PROCEDURE — 82747 ASSAY OF FOLIC ACID RBC: CPT | Performed by: INTERNAL MEDICINE

## 2023-07-20 PROCEDURE — 85014 HEMATOCRIT: CPT | Performed by: INTERNAL MEDICINE

## 2023-07-20 PROCEDURE — 85025 COMPLETE CBC W/AUTO DIFF WBC: CPT | Performed by: ORTHOPAEDIC SURGERY

## 2023-07-20 RX ADMIN — ACETAMINOPHEN 650 MG: 325 TABLET, FILM COATED ORAL at 00:31

## 2023-07-20 RX ADMIN — Medication 10 ML: at 10:07

## 2023-07-20 RX ADMIN — CALCIUM CARBONATE-VITAMIN D TAB 500 MG-200 UNIT 1 TABLET: 500-200 TAB at 08:11

## 2023-07-20 RX ADMIN — HYDROCODONE BITARTRATE AND ACETAMINOPHEN 1 TABLET: 5; 325 TABLET ORAL at 17:04

## 2023-07-20 RX ADMIN — HYDROCODONE BITARTRATE AND ACETAMINOPHEN 1 TABLET: 5; 325 TABLET ORAL at 07:52

## 2023-07-20 RX ADMIN — HYDROCODONE BITARTRATE AND ACETAMINOPHEN 1 TABLET: 10; 325 TABLET ORAL at 03:06

## 2023-07-20 RX ADMIN — LOSARTAN POTASSIUM 50 MG: 50 TABLET, FILM COATED ORAL at 20:58

## 2023-07-20 RX ADMIN — ATORVASTATIN CALCIUM 10 MG: 20 TABLET, FILM COATED ORAL at 08:10

## 2023-07-20 RX ADMIN — CARVEDILOL 25 MG: 25 TABLET, FILM COATED ORAL at 08:10

## 2023-07-20 RX ADMIN — ACETAMINOPHEN 650 MG: 325 TABLET, FILM COATED ORAL at 08:11

## 2023-07-20 RX ADMIN — HYDROCODONE BITARTRATE AND ACETAMINOPHEN 1 TABLET: 5; 325 TABLET ORAL at 12:16

## 2023-07-20 RX ADMIN — AMLODIPINE BESYLATE 10 MG: 10 TABLET ORAL at 08:13

## 2023-07-20 RX ADMIN — HYDROCODONE BITARTRATE AND ACETAMINOPHEN 1 TABLET: 5; 325 TABLET ORAL at 22:38

## 2023-07-20 RX ADMIN — CEFAZOLIN SODIUM 2000 MG: 2 INJECTION, SOLUTION INTRAVENOUS at 02:30

## 2023-07-20 RX ADMIN — CARVEDILOL 25 MG: 25 TABLET, FILM COATED ORAL at 17:04

## 2023-07-20 RX ADMIN — CALCIUM CARBONATE-VITAMIN D TAB 500 MG-200 UNIT 1 TABLET: 500-200 TAB at 20:58

## 2023-07-20 RX ADMIN — Medication 10 ML: at 20:58

## 2023-07-20 NOTE — PROGRESS NOTES
"DAILY PROGRESS NOTE  [facility]    Patient Identification:  Name: Leann Andrew  Age: 67 y.o.  Sex: female  :  1955  MRN: [id number]         Primary Care Physician: Nick Erwin MD    Subjective:  Interval History:She complains of back pain.    Objective:    Scheduled Meds:acetaminophen, 650 mg, Oral, Q8H  amLODIPine, 10 mg, Oral, Daily  atorvastatin, 10 mg, Oral, Daily  calcium 500 mg vitamin D 5 mcg (200 UT), 1 tablet, Oral, BID  carvedilol, 25 mg, Oral, BID With Meals  losartan, 50 mg, Oral, Nightly  sodium chloride, 10 mL, Intravenous, Q12H  triamterene-hydrochlorothiazide, 1 tablet, Oral, Every Other Day      Continuous Infusions:lactated ringers, 9 mL/hr, Last Rate: Stopped (23 0816)        Vital signs in last 24 hours:  Temp:  [97 °F (36.1 °C)-98.1 °F (36.7 °C)] 97.8 °F (36.6 °C)  Heart Rate:  [65-83] 73  Resp:  [16-18] 18  BP: (103-124)/(61-76) 116/69    Intake/Output:    Intake/Output Summary (Last 24 hours) at 2023 1419  Last data filed at 2023 1014  Gross per 24 hour   Intake 1300 ml   Output 2820 ml   Net -1520 ml       Exam:  /69   Pulse 73   Temp 97.8 °F (36.6 °C) (Oral)   Resp 18   Ht 162.6 cm (64.02\")   Wt 70.8 kg (156 lb 1.4 oz)   LMP  (LMP Unknown)   SpO2 99%   BMI 26.78 kg/m²     General Appearance:    Alert, cooperative, no distress   Head:    Normocephalic, without obvious abnormality, atraumatic   Eyes:       Throat:   Lips, tongue, gums normal   Neck:   Supple, symmetrical, trachea midline, no JVD   Lungs:     Clear to auscultation bilaterally, respirations unlabored   Chest Wall:    No tenderness or deformity    Heart:    Regular rate and rhythm, S1 and S2 normal, no murmur,no  Rub or gallop   Abdomen:     Soft, nontender, bowel sounds active, no masses, no organomegaly    Extremities:   Extremities normal, atraumatic, no cyanosis or edema   Pulses:      Skin:   Skin is warm and dry,  no rashes or palpable lesions "   Neurologic:   no focal deficits noted      Lab Results (last 72 hours)       Procedure Component Value Units Date/Time    Vitamin B12 [[id number]]  (Normal) Collected: 07/20/23 0435    Specimen: Blood Updated: 07/20/23 0555     Vitamin B-12 326 pg/mL     Narrative:      Results may be falsely increased if patient taking Biotin.      Comprehensive Metabolic Panel [[id number]]  (Abnormal) Collected: 07/20/23 0435    Specimen: Blood Updated: 07/20/23 0541     Glucose 141 mg/dL      BUN 12 mg/dL      Creatinine 0.93 mg/dL      Sodium 141 mmol/L      Potassium 3.6 mmol/L      Chloride 108 mmol/L      CO2 22.0 mmol/L      Calcium 9.2 mg/dL      Total Protein 5.6 g/dL      Albumin 3.9 g/dL      ALT (SGPT) 13 U/L      AST (SGOT) 23 U/L      Alkaline Phosphatase 57 U/L      Total Bilirubin 0.2 mg/dL      Globulin 1.7 gm/dL      A/G Ratio 2.3 g/dL      BUN/Creatinine Ratio 12.9     Anion Gap 11.0 mmol/L      eGFR 66.7 mL/min/1.73     Narrative:      GFR Normal >60  Chronic Kidney Disease <60  Kidney Failure <15      CBC & Differential [[id number]]  (Abnormal) Collected: 07/20/23 0435    Specimen: Blood Updated: 07/20/23 0535    Narrative:      The following orders were created for panel order CBC & Differential.  Procedure                               Abnormality         Status                     ---------                               -----------         ------                     CBC Auto Differential[[id number]]        Abnormal            Final result                 Please view results for these tests on the individual orders.    CBC Auto Differential [[id number]]  (Abnormal) Collected: 07/20/23 0435    Specimen: Blood Updated: 07/20/23 0535     WBC 5.90 10*3/mm3      RBC 2.25 10*6/mm3      Hemoglobin 8.7 g/dL      Hematocrit 24.2 %      .6 fL      MCH 38.7 pg      MCHC 36.0 g/dL      RDW 14.1 %      RDW-SD 54.6 fl      MPV 10.1 fL      Platelets 153 10*3/mm3      Neutrophil % 83.6 %      Lymphocyte % 7.3 %       Monocyte % 8.6 %      Eosinophil % 0.0 %      Basophil % 0.0 %      Neutrophils, Absolute 4.93 10*3/mm3      Lymphocytes, Absolute 0.43 10*3/mm3      Monocytes, Absolute 0.51 10*3/mm3      Eosinophils, Absolute 0.00 10*3/mm3      Basophils, Absolute 0.00 10*3/mm3     Folate RBC [[id number]] Collected: 07/20/23 0435    Specimen: Blood Updated: 07/20/23 0517          Data Review:  Results from last 7 days   Lab Units 07/20/23 0435   SODIUM mmol/L 141   POTASSIUM mmol/L 3.6   CHLORIDE mmol/L 108*   CO2 mmol/L 22.0   BUN mg/dL 12   CREATININE mg/dL 0.93   GLUCOSE mg/dL 141*   CALCIUM mg/dL 9.2     Results from last 7 days   Lab Units 07/20/23 0435   WBC 10*3/mm3 5.90   HEMOGLOBIN g/dL 8.7*   HEMATOCRIT % 24.2*   PLATELETS 10*3/mm3 153             No results found for: TROPONINT      Results from last 7 days   Lab Units 07/20/23 0435   ALK PHOS U/L 57   BILIRUBIN mg/dL 0.2   ALT (SGPT) U/L 13   AST (SGOT) U/L 23             No results found for: POCGLU        Past Medical History:   Diagnosis Date    Anemia     Back pain     NUMBNESS/ TINGLINGPAIN DOWN BILAT LOWER EXTREMITIES    Cataract     Chronic kidney disease     STAGE III - FOLLOWED ONLY BY PCP    DDD (degenerative disc disease), lumbar     Female infertility     Gout     Heart murmur     NO PROBLEMS    Hyperlipidemia     Hypertension     RA (rheumatoid arthritis)     NO PROBLEMS WITH FLEX/ EXTENSION       Assessment:  Active Hospital Problems    Diagnosis  POA    **Thoracic spinal stenosis [M48.04]  Yes    Chronic kidney disease [N18.9]  Yes    Heart murmur [R01.1]  Yes    Back pain [M54.9]  Yes    Osteopenia [M85.80]  Yes    Degenerative scoliosis in adult patient [M41.50]  Yes    Idiopathic neuropathy [G60.9]  Yes    HTN (hypertension) [I10]  Yes    RA (rheumatoid arthritis) [M06.9]  Yes    GERD (gastroesophageal reflux disease) [K21.9]  Yes      Resolved Hospital Problems   No resolved problems to display.       Plan:  Post op care. Follow lab. DC planning.  She wants to go to rehab.    Shayne Wells MD  7/20/2023  14:19 EDT

## 2023-07-20 NOTE — PROGRESS NOTES
Procedure(s):  T11-12 LAMINECTOMY FUSION     LOS: 1 day     Subjective :   Pain controlled overnight.  Voiding on own.  Ambulating to bathroom    Objective :    Vital signs in last 24 hours:  Vitals:    07/19/23 2144 07/20/23 0157 07/20/23 0607 07/20/23 0810   BP: 111/68 124/73 117/76 116/69   BP Location: Left arm Left arm Left arm    Patient Position: Lying Lying Lying    Pulse: 71 74 76 83   Resp: 16 16 18    Temp:  98.1 °F (36.7 °C) 98 °F (36.7 °C)    TempSrc:  Oral Oral    SpO2: 94% 95% 97%    Weight:       Height:           PHYSICAL EXAM:  Patient is calm, in no acute distress, awake and oriented x 3.  .  Patient is neurovascularly intact    LABS:  Results from last 7 days   Lab Units 07/20/23  0435   WBC 10*3/mm3 5.90   HEMOGLOBIN g/dL 8.7*   HEMATOCRIT % 24.2*   PLATELETS 10*3/mm3 153     Results from last 7 days   Lab Units 07/20/23  0435   SODIUM mmol/L 141   POTASSIUM mmol/L 3.6   CHLORIDE mmol/L 108*   CO2 mmol/L 22.0   BUN mg/dL 12   CREATININE mg/dL 0.93   GLUCOSE mg/dL 141*   CALCIUM mg/dL 9.2             ASSESSMENT:  Status post Procedure(s):  T11-12 LAMINECTOMY FUSION      Spinal Surgery Levels Completed:1 Level     Plan:    PT/OT  SCDs for DVT ppx  Pain control - IV and oral meds  Appreciate recs per med hospitalist  Will remove hemovac drain tomorrow  Acute post op blood loss anemia - hgb 8.7 will monitor  Dispo - home d/c vs rehab pending PT leanna Montgomery MD    Date: 7/20/2023  Time: 08:23 EDT

## 2023-07-20 NOTE — CONSULTS
PCP: Nick Erwin MD    Chief complaint /reason for consult hypertension  Requesting physician Dr. shae VIVEROS  Patient is a 67 y.o. female presents with history of rheumatoid arthritis on Enbrel, anemia, CKD, hypertension who presents and was admitted after a T11-12 LAMINECTOMY FUSION  for Spinal stenosis at T11 and T12 with thoracic myelopathy.  Patient has had generalized weakness and difficulty walking and occasional back pain.  Previous DEXA scan showed osteopenia hanna but recent 1 showed normal for age.  Patient had blood pressure of 84/ 51.  Creatinine 1.06 with GFR 48.  Glucose was 118.  Patient states that she lives with her  and occasionally gets numbness if she had standing for a long time.      PAST MEDICAL HISTORY  Past Medical History:   Diagnosis Date    Anemia     Back pain     NUMBNESS/ TINGLINGPAIN DOWN BILAT LOWER EXTREMITIES    Cataract     Chronic kidney disease     STAGE III - FOLLOWED ONLY BY PCP    DDD (degenerative disc disease), lumbar     Female infertility     Gout     Heart murmur     NO PROBLEMS    Hyperlipidemia     Hypertension     RA (rheumatoid arthritis)     NO PROBLEMS WITH FLEX/ EXTENSION       PAST SURGICAL HISTORY  Past Surgical History:   Procedure Laterality Date    COLONOSCOPY N/A 5/20/2021    Procedure: COLONOSCOPY INTO CECUM WITH COLD BIOPSY POLYPECTOMIES;  Surgeon: Chuy Bronson Jr., MD;  Location: [facility] ENDOSCOPY;  Service: General;  Laterality: N/A;  PRE- SCREENING  POST- POLYPS    EPIDURAL BLOCK      FOOT SURGERY Right     RHEUMATOID BONE SPURS REMOVED, GRAFT FROM KNEE    HYSTERECTOMY         FAMILY HISTORY  Family History   Problem Relation Age of Onset    Breast cancer Neg Hx     Ovarian cancer Neg Hx     Uterine cancer Neg Hx     Colon cancer Neg Hx     Deep vein thrombosis Neg Hx     Pulmonary embolism Neg Hx     Malig Hyperthermia Neg Hx        SOCIAL HISTORY  Social History     Tobacco Use    Smoking status: Never    Smokeless tobacco:  Never   Vaping Use    Vaping Use: Never used   Substance Use Topics    Alcohol use: Yes     Comment: wine occasional    Drug use: No       MEDICATIONS:  Medications Prior to Admission   Medication Sig Dispense Refill Last Dose    Acetaminophen (Tylenol) 325 MG capsule Take 500 mg by mouth As Needed.   7/18/2023 at 2200    allopurinol (ZYLOPRIM) 100 MG tablet Take 1 tablet by mouth 2 (Two) Times a Day.   7/18/2023 at 1900    amLODIPine (NORVASC) 10 MG tablet Take 1 tablet by mouth Daily.   7/19/2023 at 0300    atorvastatin (LIPITOR) 10 MG tablet Take 1 tablet by mouth Daily.   7/18/2023 at 0800    carvedilol (COREG) 25 MG tablet Take 1 tablet by mouth 2 (Two) Times a Day With Meals.   7/19/2023 at 0300    Cholecalciferol (VITAMIN D3) 1000 units capsule Take 2 capsules by mouth Daily.   7/18/2023 at 0800    diphenhydrAMINE (BENADRYL) 25 mg capsule Take 1 capsule by mouth Every 6 (Six) Hours As Needed for Allergies.   Past Week    folic acid (FOLVITE) 400 MCG tablet Take 1 tablet by mouth 3 (Three) Times a Day.   7/18/2023 at 2100    triamterene-hydrochlorothiazide (MAXZIDE-25) 37.5-25 MG per tablet Take 1 tablet by mouth Every Other Day.   7/18/2023 at 0800    ENBREL SURECLICK 50 MG/ML solution auto-injector Inject 50 mg as directed. TWICE MONTHLY   7/1/2023    losartan (COZAAR) 50 MG tablet Take 1 tablet by mouth Every Night. TO HOLD NIGHT BEFORE SURGERY   7/17/2023    methotrexate 2.5 MG tablet Take 4 tablets by mouth 1 (One) Time Per Week. THURSDAYS 7/5/2023       Allergies:  Lisinopril    Review of Systems:  Chronic low back pain, idiopathic neuropathy, difficulty ambulating, arthritis,   Negative for following (except as per HPI):  Constitution: chills, fevers, fatigue   Eyes: change of vision, loss of vision and discharge  ENT: ear drainage, ear ringing and facial trauma  Respiratory: cough, pleuritic pain, shortness of air  Cardiovascular: chest pressure, pain, lower extremity edema,  "palpitations  Gastrointestinal: constipation, diarrhea, nausea, vomiting, pain    Integument: rash and wound  Hematologic / Lymphatic: excessive bleeding and easy bruising  Musculoskeletal: joint pain, joint stiffness, joint swelling and muscle pain  Neurological: headaches, numbness, seizures and tremors  Behavioral / Psych: anxiety, depression and hallucinations         Vital Signs  Temp:  [97 °F (36.1 °C)-98.4 °F (36.9 °C)] 97.7 °F (36.5 °C)  Heart Rate:  [65-80] 71  Resp:  [16-18] 16  BP: ()/(41-68) 111/68  Flowsheet Rows      Flowsheet Row First Filed Value   Admission Height 162.6 cm (64\") Documented at 07/19/2023 0624   Admission Weight 70.8 kg (156 lb) Documented at 07/19/2023 0624           Body mass index is 26.78 kg/m².      Physical Exam:  General Appearance:    Alert, cooperative, in no acute distress   Head:    Normocephalic, without obvious abnormality, atraumatic   Eyes:         conjunctivae and sclerae normal, no icterus, PERRLA   ENT:    Ears grossly intact, oral mucosa moist,   Neck:   No adenopathy, supple, trachea midline,        Lungs:     Clear to auscultation,respirations regular, even and                   unlabored    Heart:    Regular rhythm and normal rate,  + murmur, normal S1 and S2,   Abdomen:     Normal bowel sounds, no masses,  soft non-tender, non-distended, overweight   Extremities:   Moves all extremities well, no cyanosis, no edema,             Pulses:   Pulses palpable and equal bilaterally   Skin:   No bleeding, rash, bruising    Neurologic:    Psych:   Cranial nerves 2 - 12 grossly intact, sensation grossly intact,     Moves all extremities well, equal bilateral strength    Alert and Oriented x 3, Normal Affect    I washed/sanitized my hands before entering the room and immediately upon leaving the room.    LABS:  No visits with results within 1 Day(s) from this visit.   Latest known visit with results is:   Pre-Admission Testing on 07/05/2023   Component Date Value Ref " Range Status    WBC 07/05/2023 5.19  3.40 - 10.80 10*3/mm3 Final    RBC 07/05/2023 2.94 (L)  3.77 - 5.28 10*6/mm3 Final    Hemoglobin 07/05/2023 11.3 (L)  12.0 - 15.9 g/dL Final    Hematocrit 07/05/2023 32.2 (L)  34.0 - 46.6 % Final    MCV 07/05/2023 109.5 (H)  79.0 - 97.0 fL Final    MCH 07/05/2023 38.4 (H)  26.6 - 33.0 pg Final    MCHC 07/05/2023 35.1  31.5 - 35.7 g/dL Final    RDW 07/05/2023 14.3  12.3 - 15.4 % Final    RDW-SD 07/05/2023 56.5 (H)  37.0 - 54.0 fl Final    MPV 07/05/2023 9.7  6.0 - 12.0 fL Final    Platelets 07/05/2023 208  140 - 450 10*3/mm3 Final    Glucose 07/05/2023 118 (H)  65 - 99 mg/dL Final    BUN 07/05/2023 19  8 - 23 mg/dL Final    Creatinine 07/05/2023 1.06 (H)  0.57 - 1.00 mg/dL Final    Sodium 07/05/2023 140  136 - 145 mmol/L Final    Potassium 07/05/2023 4.0  3.5 - 5.2 mmol/L Final    Chloride 07/05/2023 105  98 - 107 mmol/L Final    CO2 07/05/2023 22.1  22.0 - 29.0 mmol/L Final    Calcium 07/05/2023 9.8  8.6 - 10.5 mg/dL Final    Total Protein 07/05/2023 6.6  6.0 - 8.5 g/dL Final    Albumin 07/05/2023 4.6  3.5 - 5.2 g/dL Final    ALT (SGPT) 07/05/2023 19  1 - 33 U/L Final    AST (SGOT) 07/05/2023 22  1 - 32 U/L Final    Alkaline Phosphatase 07/05/2023 73  39 - 117 U/L Final    Total Bilirubin 07/05/2023 0.4  0.0 - 1.2 mg/dL Final    Globulin 07/05/2023 2.0  gm/dL Final    A/G Ratio 07/05/2023 2.3  g/dL Final    BUN/Creatinine Ratio 07/05/2023 17.9  7.0 - 25.0 Final    Anion Gap 07/05/2023 12.9  5.0 - 15.0 mmol/L Final    eGFR 07/05/2023 57.0 (L)  >60.0 mL/min/1.73 Final    PTT 07/05/2023 25.0  22.7 - 35.4 seconds Final    Protime 07/05/2023 12.6  11.7 - 14.2 Seconds Final    INR 07/05/2023 0.94  0.90 - 1.10 Final    Color, UA 07/05/2023 Yellow  Yellow, Straw Final    Appearance, UA 07/05/2023 Clear  Clear Final    pH, UA 07/05/2023 7.0  5.0 - 8.0 Final    Specific Gravity, UA 07/05/2023 1.015  1.005 - 1.030 Final    Glucose, UA 07/05/2023 Negative  Negative Final    Ketones, UA  07/05/2023 Negative  Negative Final    Bilirubin, UA 07/05/2023 Negative  Negative Final    Blood, UA 07/05/2023 Negative  Negative Final    Protein, UA 07/05/2023 Negative  Negative Final    Leuk Esterase, UA 07/05/2023 Negative  Negative Final    Nitrite, UA 07/05/2023 Negative  Negative Final    Urobilinogen, UA 07/05/2023 0.2 E.U./dL  0.2 - 1.0 E.U./dL Final    QT Interval 07/05/2023 393  ms Final         DIAGNOSTICS:  No radiology results for the last day  THORACIC SPINE 2 VIEWS     HISTORY: Back pain, surgery.     COMPARISON: Chest x-ray 07/05/2023.     FINDINGS: The patient has 12 rib-bearing thoracic vertebral bodies. 48  seconds of fluoroscopy was utilized intraoperatively. 16 spot views are  present for evaluation. The final images demonstrate fusion at T11-T12  with bilateral transpedicular screws and posterior rods.       Results Review:   I reviewed the patient's new clinical results.  Discussed with ER physician  Old records reviewed / Medical Decision Making High Complexity  I personally viewed and interpreted the patient's EKG/Telemetry data- sinus rhythm   BORDERLINE ECG -  Sinus rhythm  Borderline prolonged MA interval  Low voltage, precordial leads  Consider RVH or posterior infarct  No Prior Tracing for Comparison    ASSESSMENT AND PLAN    Thoracic spinal stenosis    RA (rheumatoid arthritis)    GERD (gastroesophageal reflux disease)    HTN (hypertension)    Chronic kidney disease    Heart murmur    Back pain    Osteopenia    Degenerative scoliosis in adult patient    Idiopathic neuropathy    Hypotension  -Give IV fluid resuscitation  -May need to put hold parameters on antiemetics or pain medication      Thoracic spinal stenosis with myelopathy and s/p T11-12 LAMINECTOMY FUSION  --POD #0  -IV and PO pain medication (with bowel regimen)  -management per primary  -defer dvt proph to primary  -PT and OT to eval and treat  -morning labs ordered   -Add calcium for healing    Heart murmur  -Cannot  find evidence of an echo.  Patient states that she is known about it in the past  -Follow-up with primary care doctor      H/o Osteopenia  -Recent DEXA scan showed normal for age.  Would consider a new calcium supplement lifelong for female to prevent osteoporosis      Chronic kidney disease 3a  -GFR 48 continue to monitor      RA (rheumatoid arthritis)/on Enbrel  -Patient to monitor due to increased risk of infection    Chronic medical conditions being monitored- stable, continue medical management  -  GERD (gastroesophageal reflux disease)  -Hypertension and hyperlipidemia      +DVT proph:    Defer to primary  + CODE STATUS: Full       I discussed the patient's findings and my recommendations with the patient and/or family.  Please reference all orders placed.    Shalini Ghotra MD  07/20/23  01:13 EDT      This document is intended for medical expert use only. Reading of this document by patients and/or patient's family without participating in medical staff guidance may result in misinterpretation and unintended morbidity. Any interpretation of such data is the responsibility of the patient and/or family member responsible for the patient in concert with their primary or specialist providers, and NOT to be left for sources of online searches such as [employer], [employer] or similar queries. Relying on these approaches to knowledge may result in misinterpretation, misguided goals of care and even death should patients or family members try recommendations outside of the realm of professional medical care in a supervised way.    Dictated utilizing Voice dictation:  Parts of this note may be an electronic transcription/translation of spoke language to printed text using Dragon dictation system.

## 2023-07-20 NOTE — THERAPY EVALUATION
Patient Name: Leann Andrew  : 1955    MRN: [id number]                              Today's Date: 2023       Admit Date: 2023    Visit Dx: No diagnosis found.  Patient Active Problem List   Diagnosis    RA (rheumatoid arthritis)    GERD (gastroesophageal reflux disease)    HTN (hypertension)    Hypercholesteremia    DDD (degenerative disc disease), lumbar    Screening for colon cancer    Thoracic spinal stenosis    Chronic kidney disease    Heart murmur    Back pain    Osteopenia    Anemia due to chronic illness    Bilateral cataracts    Long-term use of high-risk medication    Lumbar radiculopathy, acute    Menopause    Benign hypertension with chronic kidney disease, stage III    Degenerative scoliosis in adult patient    Idiopathic neuropathy     Past Medical History:   Diagnosis Date    Anemia     Back pain     NUMBNESS/ TINGLINGPAIN DOWN BILAT LOWER EXTREMITIES    Cataract     Chronic kidney disease     STAGE III - FOLLOWED ONLY BY PCP    DDD (degenerative disc disease), lumbar     Female infertility     Gout     Heart murmur     NO PROBLEMS    Hyperlipidemia     Hypertension     RA (rheumatoid arthritis)     NO PROBLEMS WITH FLEX/ EXTENSION     Past Surgical History:   Procedure Laterality Date    COLONOSCOPY N/A 2021    Procedure: COLONOSCOPY INTO CECUM WITH COLD BIOPSY POLYPECTOMIES;  Surgeon: Chuy Bronson Jr., MD;  Location: [facility] ENDOSCOPY;  Service: General;  Laterality: N/A;  PRE- SCREENING  POST- POLYPS    EPIDURAL BLOCK      FOOT SURGERY Right     RHEUMATOID BONE SPURS REMOVED, GRAFT FROM KNEE    HYSTERECTOMY        General Information       Row Name 23 1123          Physical Therapy Time and Intention    Document Type evaluation  Pt. is s/p T11-T12 Laminectomy Fusion  -MS     Mode of Treatment physical therapy;occupational therapy;co-treatment  -MS       Row Name 23 1123          General Information    Patient Profile Reviewed yes  -MS     Prior Level of  Function independent:  -MS     Existing Precautions/Restrictions fall;spinal   Exit alarm  -MS     Barriers to Rehab none identified  -MS       Row Name 07/20/23 1123          Cognition    Orientation Status (Cognition) oriented x 3  -MS       Row Name 07/20/23 1123          Safety Issues, Functional Mobility    Comment, Safety Issues/Impairments (Mobility) Gait belt used for safety.  -MS               User Key  (r) = Recorded By, (t) = Taken By, (c) = Cosigned By      Initials Name Provider Type    MS Schuler Dao MORENO, PT Physical Therapist                   Mobility       Row Name 07/20/23 1124          Bed Mobility    Bed Mobility supine-sit;sit-supine  -MS     Supine-Sit Union (Bed Mobility) contact guard  -MS     Comment, (Bed Mobility) via logroll  -MS       Row Name 07/20/23 1124          Sit-Stand Transfer    Sit-Stand Union (Transfers) minimum assist (75% patient effort)  -MS     Assistive Device (Sit-Stand Transfers) walker, standard  -MS       Row Name 07/20/23 1124          Gait/Stairs (Locomotion)    Union Level (Gait) contact guard  -MS     Assistive Device (Gait) walker, standard  -MS     Distance in Feet (Gait) 30 feet  -MS     Deviations/Abnormal Patterns (Gait) aung decreased  -MS     Bilateral Gait Deviations forward flexed posture  -MS     Comment, (Gait/Stairs) Verbal/tactile cues given for posture correction.  -MS               User Key  (r) = Recorded By, (t) = Taken By, (c) = Cosigned By      Initials Name Provider Type    MS SchulerDao, PT Physical Therapist                   Obj/Interventions       Row Name 07/20/23 1125          Range of Motion Comprehensive    Comment, General Range of Motion BUE/LE (WFL's)  -MS       Row Name 07/20/23 1125          Strength Comprehensive (MMT)    Comment, General Manual Muscle Testing (MMT) Assessment BLE (3/5)  -MS               User Key  (r) = Recorded By, (t) = Taken By, (c) = Cosigned By      Initials Name Provider  Type    Dao Patrick TIFFANIE, PT Physical Therapist                   Goals/Plan       Row Name 07/20/23 1126          Bed Mobility Goal 1 (PT)    Activity/Assistive Device (Bed Mobility Goal 1, PT) bed mobility activities, all  -MS     [place] Level/Cues Needed (Bed Mobility Goal 1, PT) standby assist  -MS     Time Frame (Bed Mobility Goal 1, PT) long term goal (LTG);1 week  -MS       Row Name 07/20/23 1126          Transfer Goal 1 (PT)    Activity/Assistive Device (Transfer Goal 1, PT) transfers, all;walker, rolling  -MS     [place] Level/Cues Needed (Transfer Goal 1, PT) standby assist  -MS     Time Frame (Transfer Goal 1, PT) long term goal (LTG);1 week  -MS       Row Name 07/20/23 1126          Gait Training Goal 1 (PT)    Activity/Assistive Device (Gait Training Goal 1, PT) gait (walking locomotion);walker, rolling  -MS     [place] Level (Gait Training Goal 1, PT) standby assist  -MS     Distance (Gait Training Goal 1, PT) 200 feet  -MS     Time Frame (Gait Training Goal 1, PT) long term goal (LTG);1 week  -MS       Row Name 07/20/23 1126          Therapy Assessment/Plan (PT)    Planned Therapy Interventions (PT) balance training;bed mobility training;gait training;home exercise program;postural re-education;patient/family education;transfer training;strengthening  -MS               User Key  (r) = Recorded By, (t) = Taken By, (c) = Cosigned By      Initials Name Provider Type    Dao Patrick TIFFANIE, PT Physical Therapist                   Clinical Impression       Row Name 07/20/23 1125          Pain    Pretreatment Pain Rating 2/10  -MS     Posttreatment Pain Rating 2/10  -MS     Pain Location - back  -MS       Row Name 07/20/23 1125          Plan of Care Review    Plan of Care Reviewed With patient;family  -MS       Row Name 07/20/23 1125          Therapy Assessment/Plan (PT)    Rehab Potential (PT) good, to achieve stated therapy goals  -MS     Criteria for Skilled Interventions Met (PT)  skilled treatment is necessary  -MS     Therapy Frequency (PT) 6 times/wk  -MS       Row Name 07/20/23 1125          Positioning and Restraints    Pre-Treatment Position in bed  -MS     Post Treatment Position chair  -MS     In Chair notified nsg;sitting;call light within reach;encouraged to call for assist;exit alarm on;with family/caregiver  -MS               User Key  (r) = Recorded By, (t) = Taken By, (c) = Cosigned By      Initials Name Provider Type    MS Dao Schuler, PT Physical Therapist                   Outcome Measures       Row Name 07/20/23 1126 07/20/23 0810       How much help from another person do you currently need...    Turning from your back to your side while in flat bed without using bedrails? 3  -MS 4  -RK    Moving from lying on back to sitting on the side of a flat bed without bedrails? 3  -MS 4  -RK    Moving to and from a bed to a chair (including a wheelchair)? 3  -MS 3  -RK    Standing up from a chair using your arms (e.g., wheelchair, bedside chair)? 3  -MS 3  -RK    Climbing 3-5 steps with a railing? 3  -MS 2  -RK    To walk in hospital room? 3  -MS 3  -RK    AM-PAC 6 Clicks Score (PT) 18  -MS 19  -RK    Highest level of mobility 6 --> Walked 10 steps or more  -MS 6 --> Walked 10 steps or more  -RK      Row Name 07/20/23 1126          Functional Assessment    Outcome Measure Options AM-PAC 6 Clicks Basic Mobility (PT)  -MS               User Key  (r) = Recorded By, (t) = Taken By, (c) = Cosigned By      Initials Name Provider Type    Dao Patrick, PT Physical Therapist    Felicia Rothman RN Registered Nurse                                 Physical Therapy Education       Title: PT OT SLP Therapies (In Progress)       Topic: Physical Therapy (In Progress)       Point: Mobility training (Done)       Learning Progress Summary             Patient Acceptance, E,D, VU,NR by MS at 7/20/2023 1127                         Point: Home exercise program (Not Started)        Learner Progress:  Not documented in this visit.              Point: Body mechanics (Done)       Learning Progress Summary             Patient Acceptance, E,D, VU,NR by MS at 7/20/2023 1127                         Point: Precautions (Done)       Learning Progress Summary             Patient Acceptance, E,D, VU,NR by MS at 7/20/2023 1127                                         User Key       Initials Effective Dates Name Provider Type Discipline    MS 06/16/21 -  Dao Schuler PT Physical Therapist PT                  PT Recommendation and Plan  Planned Therapy Interventions (PT): balance training, bed mobility training, gait training, home exercise program, postural re-education, patient/family education, transfer training, strengthening  Plan of Care Reviewed With: patient  Outcome Evaluation: Pt. presents with typical post op impairments related to back surgery that include decreased strength, decreased balance, and decreased tolerance to functional activity.  Pt. will benefit from skilled inpt. P.T. to address her functional deficits and to assist pt. in regaining her maximum level of independence with functional mobility.     Time Calculation:         PT Charges       Row Name 07/20/23 1128             Time Calculation    Start Time 1043  -MS      Stop Time 1057  -MS      Time Calculation (min) 14 min  -MS      PT Received On 07/20/23  -MS      PT - Next Appointment 07/21/23  -MS      PT Goal Re-Cert Due Date 07/27/23  -MS         Time Calculation- PT    Total Timed Code Minutes- PT 13 minute(s)  -MS                User Key  (r) = Recorded By, (t) = Taken By, (c) = Cosigned By      Initials Name Provider Type    MS Dao Schuler PT Physical Therapist                  Therapy Charges for Today       Code Description Service Date Service Provider Modifiers Qty    43369330275  PT EVAL LOW COMPLEXITY 2 7/20/2023 Dao Schuler, PT GP 1    [id number]  PT THERAPEUTIC ACT EA 15 MIN 7/20/2023 Harini  Dao MORENO, PT GP 1            PT G-Codes  Outcome Measure Options: AM-PAC 6 Clicks Basic Mobility (PT)  AM-PAC 6 Clicks Score (PT): 18  PT Discharge Summary  Anticipated Discharge Disposition (PT): skilled nursing facility    Dao Schuler, PT  7/20/2023

## 2023-07-20 NOTE — PLAN OF CARE
Goal Outcome Evaluation:  Plan of Care Reviewed With: patient           Outcome Evaluation: VSS. Some numbess and tingling in lower extremities at baseline. Ambulating x1 BRP. Pain being managed with medications and ice therapy. Dressing changed due to old dressing rolling off. plan to D/C when medically stable.

## 2023-07-20 NOTE — THERAPY EVALUATION
Patient Name: Leann Andrew  : 1955    MRN: [id number]                              Today's Date: 2023       Admit Date: 2023    Visit Dx: No diagnosis found.  Patient Active Problem List   Diagnosis    RA (rheumatoid arthritis)    GERD (gastroesophageal reflux disease)    HTN (hypertension)    Hypercholesteremia    DDD (degenerative disc disease), lumbar    Screening for colon cancer    Thoracic spinal stenosis    Chronic kidney disease    Heart murmur    Back pain    Osteopenia    Anemia due to chronic illness    Bilateral cataracts    Long-term use of high-risk medication    Lumbar radiculopathy, acute    Menopause    Benign hypertension with chronic kidney disease, stage III    Degenerative scoliosis in adult patient    Idiopathic neuropathy     Past Medical History:   Diagnosis Date    Anemia     Back pain     NUMBNESS/ TINGLINGPAIN DOWN BILAT LOWER EXTREMITIES    Cataract     Chronic kidney disease     STAGE III - FOLLOWED ONLY BY PCP    DDD (degenerative disc disease), lumbar     Female infertility     Gout     Heart murmur     NO PROBLEMS    Hyperlipidemia     Hypertension     RA (rheumatoid arthritis)     NO PROBLEMS WITH FLEX/ EXTENSION     Past Surgical History:   Procedure Laterality Date    COLONOSCOPY N/A 2021    Procedure: COLONOSCOPY INTO CECUM WITH COLD BIOPSY POLYPECTOMIES;  Surgeon: Chuy Bronson Jr., MD;  Location: [facility] ENDOSCOPY;  Service: General;  Laterality: N/A;  PRE- SCREENING  POST- POLYPS    EPIDURAL BLOCK      FOOT SURGERY Right     RHEUMATOID BONE SPURS REMOVED, GRAFT FROM KNEE    HYSTERECTOMY        General Information       Row Name 23 1351          OT Time and Intention    Document Type evaluation  -KB     Mode of Treatment co-treatment;occupational therapy  -KB       Row Name 23 135          General Information    Patient Profile Reviewed yes  -KB     Prior Level of Function independent:  -KB     Existing Precautions/Restrictions fall;spinal   -     Barriers to Rehab none identified  -       Row Name 07/20/23 1351          Living Environment    People in Home spouse  -       Row Name 07/20/23 1351          Cognition    Orientation Status (Cognition) oriented x 3  -       Row Name 07/20/23 1351          Safety Issues, Functional Mobility    Impairments Affecting Function (Mobility) pain;strength  -               User Key  (r) = Recorded By, (t) = Taken By, (c) = Cosigned By      Initials Name Provider Type    Janae Mixon OT Occupational Therapist                     Mobility/ADL's       Row Name 07/20/23 [facility]          Bed Mobility    Bed Mobility supine-sit;sit-supine  -     Supine-Sit [place] (Bed Mobility) contact guard  -       Row Name 07/20/23 [facility]          Sit-Stand Transfer    Sit-Stand [place] (Transfers) minimum assist (75% patient effort)  -     Assistive Device (Sit-Stand Transfers) walker, standard  -       Row Name 07/20/23 [facility]          Functional Mobility    Functional Mobility- Ind. Level contact guard assist;1 person  -     Functional Mobility- Device walker, front-wheeled  -       Row Name 07/20/23 [facility]          Activities of Daily Living    BADL Assessment/Intervention lower body dressing  -       Row Name 07/20/23 [facility]          Lower Body Dressing Assessment/Training    [place] Level (Lower Body Dressing) don;socks;maximum assist (25% patient effort)  -               User Key  (r) = Recorded By, (t) = Taken By, (c) = Cosigned By      Initials Name Provider Type    Janae Mixon OT Occupational Therapist                   Obj/Interventions       Row Name 07/20/23 1351          Sensory Assessment (Somatosensory)    Sensory Assessment (Somatosensory) sensation intact  -       Row Name 07/20/23 1355          Vision Assessment/Intervention    Visual Impairment/Limitations WNL  -       Row Name 07/20/23 135          Range of Motion Comprehensive    General Range of Motion bilateral  upper extremity ROM WFL  -KB       Row Name 07/20/23 3041          Balance    Balance Assessment sitting static balance;sitting dynamic balance;standing static balance;standing dynamic balance  -KB     Static Sitting Balance standby assist  -KB     Dynamic Sitting Balance standby assist  -KB     Static Standing Balance standby assist  -KB     Dynamic Standing Balance contact guard  -KB               User Key  (r) = Recorded By, (t) = Taken By, (c) = Cosigned By      Initials Name Provider Type    KB Janae Botello OT Occupational Therapist                   Goals/Plan       Row Name 07/20/23 6902          Bed Mobility Goal 1 (OT)    Activity/Assistive Device (Bed Mobility Goal 1, OT) sit to supine/supine to sit  -KB     [place] Level/Cues Needed (Bed Mobility Goal 1, OT) modified independence  -KB     Time Frame (Bed Mobility Goal 1, OT) 2 weeks  -KB     Progress/Outcomes (Bed Mobility Goal 1, OT) goal ongoing  -       Row Name 07/20/23 3286          Transfer Goal 1 (OT)    Activity/Assistive Device (Transfer Goal 1, OT) toilet  -KB     [place] Level/Cues Needed (Transfer Goal 1, OT) modified independence  -KB     Time Frame (Transfer Goal 1, OT) 2 weeks  -KB     Progress/Outcome (Transfer Goal 1, OT) goal ongoing  -       Row Name 07/20/23 8699          Dressing Goal 1 (OT)    Activity/Device (Dressing Goal 1, OT) lower body dressing  -KB     [place]/Cues Needed (Dressing Goal 1, OT) modified independence  -KB     Time Frame (Dressing Goal 1, OT) 2 weeks  -KB     Progress/Outcome (Dressing Goal 1, OT) goal ongoing  -       Row Name 07/20/23 9594          Toileting Goal 1 (OT)    Activity/Device (Toileting Goal 1, OT) adjust/manage clothing;perform perineal hygiene  -KB     [place] Level/Cues Needed (Toileting Goal 1, OT) modified independence  -KB     Time Frame (Toileting Goal 1, OT) 2 weeks  -KB     Progress/Outcome (Toileting Goal 1, OT) goal ongoing  -       Row Name 07/20/23 2917           Therapy Assessment/Plan (OT)    Planned Therapy Interventions (OT) BADL retraining;adaptive equipment training;transfer/mobility retraining;strengthening exercise;patient/caregiver education/training  -KB               User Key  (r) = Recorded By, (t) = Taken By, (c) = Cosigned By      Initials Name Provider Type    Janae Mixon OT Occupational Therapist                   Clinical Impression       Row Name 07/20/23 7993          Pain Assessment    Pretreatment Pain Rating 2/10  -KB     Posttreatment Pain Rating 2/10  -KB     Pain Location lower  -KB     Pain Location - back  -KB       Row Name 07/20/23 6977          Plan of Care Review    Plan of Care Reviewed With patient  -KB     Outcome Evaluation Pt seen for OT eval this date. Pt admitted following T11-12 laminectomy. Pt with spinal precautions. Lives with  and was indp prior with adls. Pt educated on log roll tech to get out of bed. Requiring max assist for LE dressing to thread over feet. Performed fm in room with rw with cga. Pt demonstrating below baseline performance of adls and fm will cont to benefit acute OT. Recommending snf at time of dc.  -KB       Row Name 07/20/23 2166          Therapy Assessment/Plan (OT)    Rehab Potential (OT) good, to achieve stated therapy goals  -     Criteria for Skilled Therapeutic Interventions Met (OT) yes;meets criteria;skilled treatment is necessary  -KB     Therapy Frequency (OT) 5 times/wk  -KB       Row Name 07/20/23 0101          Therapy Plan Review/Discharge Plan (OT)    Anticipated Discharge Disposition (OT) skilled nursing facility;home with home health  -KB       Row Name 07/20/23 6045          Positioning and Restraints    Pre-Treatment Position in bed  -KB     Post Treatment Position chair  -KB     In Chair notified nsg;reclined;encouraged to call for assist;call light within reach;exit alarm on;with family/caregiver  -KB               User Key  (r) = Recorded By, (t) = Taken By, (c) =  Cosigned By      Initials Name Provider Type    Janae Mixon OT Occupational Therapist                   Outcome Measures       Row Name 07/20/23 1400          How much help from another is currently needed...    Putting on and taking off regular lower body clothing? 3  -KB     Bathing (including washing, rinsing, and drying) 3  -KB     Toileting (which includes using toilet bed pan or urinal) 3  -KB     Putting on and taking off regular upper body clothing 4  -KB     Taking care of personal grooming (such as brushing teeth) 4  -KB     Eating meals 4  -KB     AM-PAC 6 Clicks Score (OT) 21  -KB       Row Name 07/20/23 1126 07/20/23 0810       How much help from another person do you currently need...    Turning from your back to your side while in flat bed without using bedrails? 3  -MS 4  -RK    Moving from lying on back to sitting on the side of a flat bed without bedrails? 3  -MS 4  -RK    Moving to and from a bed to a chair (including a wheelchair)? 3  -MS 3  -RK    Standing up from a chair using your arms (e.g., wheelchair, bedside chair)? 3  -MS 3  -RK    Climbing 3-5 steps with a railing? 3  -MS 2  -RK    To walk in hospital room? 3  -MS 3  -RK    AM-PAC 6 Clicks Score (PT) 18  -MS 19  -RK    Highest level of mobility 6 --> Walked 10 steps or more  -MS 6 --> Walked 10 steps or more  -RK      Row Name 07/20/23 1400 07/20/23 1126       Functional Assessment    Outcome Measure Options AM-PAC 6 Clicks Daily Activity (OT)  -KB AM-PAC 6 Clicks Basic Mobility (PT)  -MS              User Key  (r) = Recorded By, (t) = Taken By, (c) = Cosigned By      Initials Name Provider Type    Dao Patrick, PT Physical Therapist    Felicia Rothman, RN Registered Nurse    Janae Mixon OT Occupational Therapist                    Occupational Therapy Education       Title: PT OT SLP Therapies (In Progress)       Topic: Occupational Therapy (In Progress)       Point: ADL training (Done)       Description:    Instruct learner(s) on proper safety adaptation and remediation techniques during self care or transfers.   Instruct in proper use of assistive devices.                  Learning Progress Summary             Patient MARIA DEL CARMEN Shelley, VU by REGINALD at 7/20/2023 1401    Comment: began ed on spinal precautions, adls                         Point: Home exercise program (Not Started)       Description:   Instruct learner(s) on appropriate technique for monitoring, assisting and/or progressing therapeutic exercises/activities.                  Learner Progress:  Not documented in this visit.              Point: Precautions (Done)       Description:   Instruct learner(s) on prescribed precautions during self-care and functional transfers.                  Learning Progress Summary             Patient MARAI DEL CARMEN Shelley, VU by REGINALD at 7/20/2023 1401    Comment: began ed on spinal precautions, adls                         Point: Body mechanics (Not Started)       Description:   Instruct learner(s) on proper positioning and spine alignment during self-care, functional mobility activities and/or exercises.                  Learner Progress:  Not documented in this visit.                              User Key       Initials Effective Dates Name Provider Type Discipline    REGINALD 01/03/23 -  Janae Botello OT Occupational Therapist OT                  OT Recommendation and Plan  Planned Therapy Interventions (OT): BADL retraining, adaptive equipment training, transfer/mobility retraining, strengthening exercise, patient/caregiver education/training  Therapy Frequency (OT): 5 times/wk  Plan of Care Review  Plan of Care Reviewed With: patient  Outcome Evaluation: Pt seen for OT eval this date. Pt admitted following T11-12 laminectomy. Pt with spinal precautions. Lives with  and was indp prior with adls. Pt educated on log roll tech to get out of bed. Requiring max assist for LE dressing to thread over feet. Performed fm in room with rw with cga. Pt  demonstrating below baseline performance of adls and fm will cont to benefit acute OT. Recommending snf at time of dc.     Time Calculation:   Evaluation Complexity (OT)  Review Occupational Profile/Medical/Therapy History Complexity: expanded/moderate complexity  Assessment, Occupational Performance/Identification of Deficit Complexity: 3-5 performance deficits  Clinical Decision Making Complexity (OT): detailed assessment/moderate complexity  Overall Complexity of Evaluation (OT): moderate complexity     Time Calculation- OT       Row Name 07/20/23 1401             Time Calculation- OT    OT Start Time 1043  -KB      OT Stop Time 1056  -KB      OT Time Calculation (min) 13 min  -KB      Total Timed Code Minutes- OT 9 minute(s)  -KB      OT Received On 07/20/23  -KB      OT - Next Appointment 07/21/23  -KB      OT Goal Re-Cert Due Date 08/03/23  -KB         Timed Charges    67229 - OT Therapeutic Activity Minutes 9  -KB         Untimed Charges    OT Eval/Re-eval Minutes 4  -KB         Total Minutes    Timed Charges Total Minutes 9  -KB      Untimed Charges Total Minutes 4  -KB       Total Minutes 13  -KB                User Key  (r) = Recorded By, (t) = Taken By, (c) = Cosigned By      Initials Name Provider Type    KB Janae Botello OT Occupational Therapist                  Therapy Charges for Today       Code Description Service Date Service Provider Modifiers Qty    74965852648 HC OT THERAPEUTIC ACT EA 15 MIN 7/20/2023 Janae Botello OT GO 1    02058964444 HC OT EVAL MOD COMPLEXITY 2 7/20/2023 Janae Botello OT GO 1                 Janae Botello OT  7/20/2023

## 2023-07-20 NOTE — PLAN OF CARE
Goal Outcome Evaluation:  Plan of Care Reviewed With: patient        Progress: improving  Outcome Evaluation: POD#1 of T11-T12 Lami. Dressing to back intact. HV in place. VSS. PO pain medication and scheduled tylenol helping with pain. Ambulating well with assistance. Voiding fine per BRP. Education provided on blood pressure monitoring and paln control. Patient vberbalized understanding.

## 2023-07-20 NOTE — PLAN OF CARE
Goal Outcome Evaluation:  Plan of Care Reviewed With: patient           Outcome Evaluation: Pt seen for OT eval this date. Pt admitted following T11-12 laminectomy. Pt with spinal precautions. Lives with  and was indp prior with adls. Pt educated on log roll tech to get out of bed. Requiring max assist for LE dressing to thread over feet. Performed fm in room with rw with cga. Pt demonstrating below baseline performance of adls and fm will cont to benefit acute OT. Recommending snf at time of dc.    OT wore appropriate PPE during session and performed hand hygiene before/after session.     Anticipated Discharge Disposition (OT): skilled nursing facility, home with home health

## 2023-07-20 NOTE — PLAN OF CARE
Goal Outcome Evaluation:  Plan of Care Reviewed With: patient           Outcome Evaluation: Pt. presents with typical post op impairments related to back surgery that include decreased strength, decreased balance, and decreased tolerance to functional activity.  Pt. will benefit from skilled inpt. P.T. to address her functional deficits and to assist pt. in regaining her maximum level of independence with functional mobility.      Anticipated Discharge Disposition (PT): skilled nursing facility    Patient was not wearing a face mask during this therapy encounter. Therapist used appropriate personal protective equipment including eye protection, mask, and gloves.  Mask used was standard procedure mask. Appropriate PPE was worn during the entire therapy session. Hand hygiene was completed before and after therapy session. Patient is not in enhanced droplet precautions.

## 2023-07-20 NOTE — CASE MANAGEMENT/SOCIAL WORK
Discharge Planning Assessment  [facility]     Patient Name: Leann Andrew  MRN: [id number]  Today's Date: 7/20/2023    Admit Date: 7/19/2023    Plan: Home with family support & HH vs SNF.   Discharge Needs Assessment       Row Name 07/20/23 1552       Living Environment    People in Home spouse    Name(s) of People in Home /Neil.    Current Living Arrangements home    Primary Care Provided by self    Provides Primary Care For no one    Family Caregiver if Needed spouse    Quality of Family Relationships helpful;involved;supportive    Able to Return to Prior Arrangements other (see comments)  Plans SNF.       Transition Planning    Patient/Family Anticipates Transition to inpatient rehabilitation facility    Patient/Family Anticipated Services at Transition     Transportation Anticipated family or friend will provide;health plan transportation       Discharge Needs Assessment    Readmission Within the Last 30 Days no previous admission in last 30 days    Equipment Currently Used at Home walker, rolling    Discharge Facility/Level of Care Needs nursing facility, skilled    Provided Post Acute Provider List? N/A    N/A Provider List Comment Requests the [facility].                   Discharge Plan       Row Name 07/20/23 1552       Plan    Plan Home with family support & HH vs SNF.    Patient/Family in Agreement with Plan yes    Plan Comments Spoke with the patient, verified current information and explained the role of the CCP. Patient said she lives with her /Neil and has family support. She's IADL and uses a walker as needed. She has no history with RH/HH. Patient plans to d/c to the [facility]. Physical Therapy eval noted. Case was sent to the [facility] Physician Advisor. Await their determination.                  Continued Care and Services - Admitted Since 7/19/2023    Coordination has not been started for this encounter.       Expected Discharge Date and  Time       Expected Discharge Date Expected Discharge Time    Jul 21, 2023            Demographic Summary       Row Name 07/20/23 1551       General Information    Admission Type inpatient    Reason for Consult discharge planning    Preferred Language English       Contact Information    Permission Granted to Share Info With ;family/designee                   Functional Status       Row Name 07/20/23 1551       Functional Status    Usual Activity Tolerance moderate       Functional Status, IADL    Medications independent    Meal Preparation assistive equipment    Housekeeping assistive equipment    Laundry assistive equipment    Shopping assistive equipment;assistive equipment and person       Mental Status Summary    Recent Changes in Mental Status/Cognitive Functioning no changes                   Psychosocial       Row Name 07/20/23 1551       Intellectual Performance WDL    Level of Consciousness Alert       Coping/Stress    Patient Personal Strengths able to adapt    Sources of Support spouse    Reaction to Health Status accepting    Understanding of Condition and Treatment adequate understanding of medical condition;adequate understanding of treatment       Developmental Stage (Eriksson's)    Developmental Stage Stage 8 (63 years-death/Late Adulthood) Integrity vs. Despair Felicia OLIVER RN

## 2023-07-21 LAB
ANION GAP SERPL CALCULATED.3IONS-SCNC: 8 MMOL/L (ref 5–15)
BASOPHILS # BLD AUTO: 0 10*3/MM3 (ref 0–0.2)
BASOPHILS NFR BLD AUTO: 0 % (ref 0–1.5)
BUN SERPL-MCNC: 15 MG/DL (ref 8–23)
BUN/CREAT SERPL: 18.3 (ref 7–25)
CALCIUM SPEC-SCNC: 8.7 MG/DL (ref 8.6–10.5)
CHLORIDE SERPL-SCNC: 111 MMOL/L (ref 98–107)
CO2 SERPL-SCNC: 26 MMOL/L (ref 22–29)
CREAT SERPL-MCNC: 0.82 MG/DL (ref 0.57–1)
DEPRECATED RDW RBC AUTO: 56.5 FL (ref 37–54)
EGFRCR SERPLBLD CKD-EPI 2021: 77.5 ML/MIN/1.73
EOSINOPHIL # BLD AUTO: 0.03 10*3/MM3 (ref 0–0.4)
EOSINOPHIL NFR BLD AUTO: 0.4 % (ref 0.3–6.2)
ERYTHROCYTE [DISTWIDTH] IN BLOOD BY AUTOMATED COUNT: 14.1 % (ref 12.3–15.4)
GLUCOSE SERPL-MCNC: 104 MG/DL (ref 65–99)
HCT VFR BLD AUTO: 23.2 % (ref 34–46.6)
HGB BLD-MCNC: 7.8 G/DL (ref 12–15.9)
LYMPHOCYTES # BLD AUTO: 1.24 10*3/MM3 (ref 0.7–3.1)
LYMPHOCYTES NFR BLD AUTO: 18.1 % (ref 19.6–45.3)
MCH RBC QN AUTO: 37 PG (ref 26.6–33)
MCHC RBC AUTO-ENTMCNC: 33.6 G/DL (ref 31.5–35.7)
MCV RBC AUTO: 110 FL (ref 79–97)
MONOCYTES # BLD AUTO: 0.93 10*3/MM3 (ref 0.1–0.9)
MONOCYTES NFR BLD AUTO: 13.6 % (ref 5–12)
NEUTROPHILS NFR BLD AUTO: 4.62 10*3/MM3 (ref 1.7–7)
NEUTROPHILS NFR BLD AUTO: 67.5 % (ref 42.7–76)
PLATELET # BLD AUTO: 132 10*3/MM3 (ref 140–450)
PMV BLD AUTO: 9.9 FL (ref 6–12)
POTASSIUM SERPL-SCNC: 3.6 MMOL/L (ref 3.5–5.2)
RBC # BLD AUTO: 2.11 10*6/MM3 (ref 3.77–5.28)
SODIUM SERPL-SCNC: 145 MMOL/L (ref 136–145)
WBC NRBC COR # BLD: 6.85 10*3/MM3 (ref 3.4–10.8)

## 2023-07-21 PROCEDURE — 85025 COMPLETE CBC W/AUTO DIFF WBC: CPT | Performed by: HOSPITALIST

## 2023-07-21 PROCEDURE — 80048 BASIC METABOLIC PNL TOTAL CA: CPT | Performed by: HOSPITALIST

## 2023-07-21 PROCEDURE — 97530 THERAPEUTIC ACTIVITIES: CPT

## 2023-07-21 RX ORDER — HYDROCODONE BITARTRATE AND ACETAMINOPHEN 7.5; 325 MG/1; MG/1
1 TABLET ORAL EVERY 6 HOURS PRN
Qty: 50 TABLET | Refills: 0 | Status: SHIPPED | OUTPATIENT
Start: 2023-07-21

## 2023-07-21 RX ADMIN — TRIAMTERENE AND HYDROCHLOROTHIAZIDE 1 TABLET: 37.5; 25 TABLET ORAL at 08:35

## 2023-07-21 RX ADMIN — CALCIUM CARBONATE-VITAMIN D TAB 500 MG-200 UNIT 1 TABLET: 500-200 TAB at 20:13

## 2023-07-21 RX ADMIN — DOCUSATE SODIUM 100 MG: 100 CAPSULE, LIQUID FILLED ORAL at 08:35

## 2023-07-21 RX ADMIN — HYDROCODONE BITARTRATE AND ACETAMINOPHEN 1 TABLET: 5; 325 TABLET ORAL at 13:54

## 2023-07-21 RX ADMIN — CARVEDILOL 25 MG: 25 TABLET, FILM COATED ORAL at 17:51

## 2023-07-21 RX ADMIN — CARVEDILOL 25 MG: 25 TABLET, FILM COATED ORAL at 08:35

## 2023-07-21 RX ADMIN — CALCIUM CARBONATE-VITAMIN D TAB 500 MG-200 UNIT 1 TABLET: 500-200 TAB at 08:34

## 2023-07-21 RX ADMIN — HYDROCODONE BITARTRATE AND ACETAMINOPHEN 1 TABLET: 5; 325 TABLET ORAL at 18:21

## 2023-07-21 RX ADMIN — BISACODYL 5 MG: 5 TABLET ORAL at 08:34

## 2023-07-21 RX ADMIN — LOSARTAN POTASSIUM 50 MG: 50 TABLET, FILM COATED ORAL at 20:13

## 2023-07-21 RX ADMIN — ACETAMINOPHEN 650 MG: 325 TABLET, FILM COATED ORAL at 08:35

## 2023-07-21 RX ADMIN — AMLODIPINE BESYLATE 10 MG: 10 TABLET ORAL at 08:34

## 2023-07-21 RX ADMIN — Medication 10 ML: at 20:17

## 2023-07-21 RX ADMIN — ATORVASTATIN CALCIUM 10 MG: 20 TABLET, FILM COATED ORAL at 08:34

## 2023-07-21 RX ADMIN — Medication 10 ML: at 08:41

## 2023-07-21 RX ADMIN — POLYETHYLENE GLYCOL 3350 17 G: 17 POWDER, FOR SOLUTION ORAL at 20:16

## 2023-07-21 RX ADMIN — HYDROCODONE BITARTRATE AND ACETAMINOPHEN 1 TABLET: 5; 325 TABLET ORAL at 02:46

## 2023-07-21 NOTE — PLAN OF CARE
Goal Outcome Evaluation:           Progress: improving  Outcome Evaluation: POD2 T11-T12 lami and fusion, dressing c/d/i, hemovac in place. Pt A&Ox4, VSS, on room air, up with assist x1, voidind per brp, pain controlled with prn oral pain medication. Pt plan pending possible rehab

## 2023-07-21 NOTE — PLAN OF CARE
Goal Outcome Evaluation:  Plan of Care Reviewed With: patient           Outcome Evaluation: Upon entering room, pt. up in bathroom with nursing staff, awake/alert, and agreeable to work with P.T. despite c/o pain and fatigue. Pt. able to ambulate 40 feet, CGA x 1 with use of Rwx. Pt. requires Min. assist x 1 (via logroll) and Min. assist x 1 for sit <-> stand transfers.  BLE ther. ex. program x 10 reps completed for general strengthening. Verbal/tactile cues given during ambulation for posture correction.  Will continue to progress functional mobility as tolerated.      Anticipated Discharge Disposition (PT): skilled nursing facility    Patient was not wearing a face mask during this therapy encounter. Therapist used appropriate personal protective equipment including eye protection, mask, and gloves.  Mask used was standard procedure mask. Appropriate PPE was worn during the entire therapy session. Hand hygiene was completed before and after therapy session. Patient is not in enhanced droplet precautions.

## 2023-07-21 NOTE — DISCHARGE SUMMARY
Date of Discharge:  7/24/2023    Discharge Diagnosis: Thoracic spinal stenosis    Presenting Problem/History of Present Illness  Active Hospital Problems    Diagnosis  POA    **Thoracic spinal stenosis [M48.04]  Yes    Chronic kidney disease [N18.9]  Yes    Heart murmur [R01.1]  Yes    Back pain [M54.9]  Yes    Osteopenia [M85.80]  Yes    Degenerative scoliosis in adult patient [M41.50]  Yes    Idiopathic neuropathy [G60.9]  Yes    HTN (hypertension) [I10]  Yes    RA (rheumatoid arthritis) [M06.9]  Yes    GERD (gastroesophageal reflux disease) [K21.9]  Yes      Resolved Hospital Problems   No resolved problems to display.          Hospital Course  Patient is a 67 y.o. female presented with gait and balance changes.  Imaging work-up showed thoracic stenosis with spinal cord myelomalacia at T11-T12.  She underwent decompression and fusion at T11-T12.  She tolerated the procedure well and was transferred to the floor postoperatively.  She was voiding on her own and tolerating p.o. intake.  Her Hemovac drain was removed on postop day 2.  She worked with physical therapy who recommended discharge to a subacute rehab facility.  She was discharged on POD 5 to SNF in stable condition.  Enbrel to be held while she is in rehab.    Procedures Performed    Procedure(s):  T11-12 LAMINECTOMY FUSION  -------------------       Consults:   Consults       Date and Time Order Name Status Description    7/19/2023  2:11 PM Inpatient Hospitalist Consult Completed                 Condition on Discharge: Stable    Vital Signs  Temp:  [97.7 °F (36.5 °C)-98.1 °F (36.7 °C)] 98.1 °F (36.7 °C)  Heart Rate:  [68-83] 83  Resp:  [16] 16  BP: ()/(59-67) 127/67    Physical Exam:   Alert oriented/no acute distress  She is neurologically intact in both lower extremities.    Discharge Disposition  Rehab Facility or Unit (DC - External)    Discharge Medications     Discharge Medications        New Medications        Instructions Start Date    HYDROcodone-acetaminophen 7.5-325 MG per tablet  Commonly known as: Norco   1 tablet, Oral, Every 6 Hours PRN             Continue These Medications        Instructions Start Date   allopurinol 100 MG tablet  Commonly known as: ZYLOPRIM   100 mg, Oral, 2 Times Daily      amLODIPine 10 MG tablet  Commonly known as: NORVASC   10 mg, Oral, Daily      atorvastatin 10 MG tablet  Commonly known as: LIPITOR   10 mg, Oral, Daily      carvedilol 25 MG tablet  Commonly known as: COREG   25 mg, Oral, 2 Times Daily With Meals      diphenhydrAMINE 25 mg capsule  Commonly known as: BENADRYL   25 mg, Oral, Every 6 Hours PRN      folic acid 400 MCG tablet  Commonly known as: FOLVITE   400 mcg, Oral, 3 Times Daily      losartan 50 MG tablet  Commonly known as: COZAAR   50 mg, Oral, Nightly, TO HOLD NIGHT BEFORE SURGERY       triamterene-hydrochlorothiazide 37.5-25 MG per tablet  Commonly known as: MAXZIDE-25   1 tablet, Oral, Every Other Day      Tylenol 325 MG capsule  Generic drug: Acetaminophen   500 mg, Oral, As Needed      Vitamin D3 25 MCG (1000 UT) capsule   2,000 Units, Oral, Daily             Stop These Medications      Enbrel SureClick 50 MG/ML solution auto-injector  Generic drug: Etanercept     methotrexate 2.5 MG tablet              Discharge Diet:     Activity at Discharge:     Follow-up Appointments  Future Appointments   Date Time Provider Department Center   8/16/2023 11:00 AM [facility] 1 [facility]         Test Results Pending at Discharge  Pending Labs       Order Current Status    Folate RBC In process             Smith Montgomery MD  07/24/23  13:38 EDT

## 2023-07-21 NOTE — DISCHARGE PLACEMENT REQUEST
"Leann Wright (67 y.o. Female)       Date of Birth   1955    Social Security Number       Address   [address] [place] [address]    Home Phone   [phone]    MRN   [id number]       [employer]   [employer]    Marital Status                               Admission Date   7/19/23    Admission Type   Elective    Admitting Provider   Smith Montgomery MD    Attending Provider   Smith Montgomery MD    Department, Room/Bed   [address], 83/1       Discharge Date       Discharge Disposition   Rehab Facility or Unit (DC - External)    Discharge Destination                                 Attending Provider: Smith Montgomery MD    Allergies: Lisinopril    Isolation: None   Infection: None   Code Status: CPR    Ht: 162.6 cm (64.02\")   Wt: 70.8 kg (156 lb 1.4 oz)    Admission Cmt: None   Principal Problem: Thoracic spinal stenosis [M48.04]                   Active Insurance as of 7/19/2023       Primary Coverage       Payor Plan Insurance Group Employer/Plan Group    [facility] MEDICARE REPLACEMENT [facility] MEDICARE REPLACEMENT [id number]       Payor Plan Address Payor Plan Phone Number Payor Plan Fax Number Effective Dates    [address] [address]   1/1/2019 - None Entered    [facility] [id number]         Subscriber Name Subscriber Birth Date Member ID       LEANN WRIGHT 1955 [id number]                     Emergency Contacts        (Rel.) Home Phone Work Phone Mobile Phone    Neil Wright (Spouse) [phone] -- --              "

## 2023-07-21 NOTE — PROGRESS NOTES
"DAILY PROGRESS NOTE  [facility]    Patient Identification:  Name: Leann Andrew  Age: 67 y.o.  Sex: female  :  1955  MRN: [id number]         Primary Care Physician: Nick Erwin MD    Subjective:  Interval History:She complains of back pain.    Objective:    Scheduled Meds:acetaminophen, 650 mg, Oral, Q8H  amLODIPine, 10 mg, Oral, Daily  atorvastatin, 10 mg, Oral, Daily  calcium 500 mg vitamin D 5 mcg (200 UT), 1 tablet, Oral, BID  carvedilol, 25 mg, Oral, BID With Meals  losartan, 50 mg, Oral, Nightly  sodium chloride, 10 mL, Intravenous, Q12H  triamterene-hydrochlorothiazide, 1 tablet, Oral, Every Other Day      Continuous Infusions:lactated ringers, 9 mL/hr, Last Rate: Stopped (23 0816)        Vital signs in last 24 hours:  Temp:  [97.7 °F (36.5 °C)-98.4 °F (36.9 °C)] 97.7 °F (36.5 °C)  Heart Rate:  [62-78] 78  Resp:  [16] 16  BP: (102-126)/(55-69) 119/64    Intake/Output:    Intake/Output Summary (Last 24 hours) at 2023 1457  Last data filed at 2023 1300  Gross per 24 hour   Intake 810 ml   Output 300 ml   Net 510 ml         Exam:  /64 (BP Location: Left arm, Patient Position: Lying)   Pulse 78   Temp 97.7 °F (36.5 °C) (Oral)   Resp 16   Ht 162.6 cm (64.02\")   Wt 70.8 kg (156 lb 1.4 oz)   LMP  (LMP Unknown)   SpO2 98%   BMI 26.78 kg/m²     General Appearance:    Alert, cooperative, no distress   Head:    Normocephalic, without obvious abnormality, atraumatic   Eyes:       Throat:   Lips, tongue, gums normal   Neck:   Supple, symmetrical, trachea midline, no JVD   Lungs:     Clear to auscultation bilaterally, respirations unlabored   Chest Wall:    No tenderness or deformity    Heart:    Regular rate and rhythm, S1 and S2 normal, no murmur,no  Rub or gallop   Abdomen:     Soft, nontender, bowel sounds active, no masses, no organomegaly    Extremities:   Extremities normal, atraumatic, no cyanosis or edema   Pulses:      Skin:   Skin is warm " and dry,  no rashes or palpable lesions   Neurologic:   no focal deficits noted      Lab Results (last 72 hours)       Procedure Component Value Units Date/Time    Vitamin B12 [[id number]]  (Normal) Collected: 07/20/23 0435    Specimen: Blood Updated: 07/20/23 0555     Vitamin B-12 326 pg/mL     Narrative:      Results may be falsely increased if patient taking Biotin.      Comprehensive Metabolic Panel [[id number]]  (Abnormal) Collected: 07/20/23 0435    Specimen: Blood Updated: 07/20/23 0541     Glucose 141 mg/dL      BUN 12 mg/dL      Creatinine 0.93 mg/dL      Sodium 141 mmol/L      Potassium 3.6 mmol/L      Chloride 108 mmol/L      CO2 22.0 mmol/L      Calcium 9.2 mg/dL      Total Protein 5.6 g/dL      Albumin 3.9 g/dL      ALT (SGPT) 13 U/L      AST (SGOT) 23 U/L      Alkaline Phosphatase 57 U/L      Total Bilirubin 0.2 mg/dL      Globulin 1.7 gm/dL      A/G Ratio 2.3 g/dL      BUN/Creatinine Ratio 12.9     Anion Gap 11.0 mmol/L      eGFR 66.7 mL/min/1.73     Narrative:      GFR Normal >60  Chronic Kidney Disease <60  Kidney Failure <15      CBC & Differential [[id number]]  (Abnormal) Collected: 07/20/23 0435    Specimen: Blood Updated: 07/20/23 0535    Narrative:      The following orders were created for panel order CBC & Differential.  Procedure                               Abnormality         Status                     ---------                               -----------         ------                     CBC Auto Differential[[id number]]        Abnormal            Final result                 Please view results for these tests on the individual orders.    CBC Auto Differential [[id number]]  (Abnormal) Collected: 07/20/23 0435    Specimen: Blood Updated: 07/20/23 0535     WBC 5.90 10*3/mm3      RBC 2.25 10*6/mm3      Hemoglobin 8.7 g/dL      Hematocrit 24.2 %      .6 fL      MCH 38.7 pg      MCHC 36.0 g/dL      RDW 14.1 %      RDW-SD 54.6 fl      MPV 10.1 fL      Platelets 153 10*3/mm3      Neutrophil %  83.6 %      Lymphocyte % 7.3 %      Monocyte % 8.6 %      Eosinophil % 0.0 %      Basophil % 0.0 %      Neutrophils, Absolute 4.93 10*3/mm3      Lymphocytes, Absolute 0.43 10*3/mm3      Monocytes, Absolute 0.51 10*3/mm3      Eosinophils, Absolute 0.00 10*3/mm3      Basophils, Absolute 0.00 10*3/mm3     Folate RBC [[id number]] Collected: 07/20/23 0435    Specimen: Blood Updated: 07/20/23 0517          Data Review:  Results from last 7 days   Lab Units 07/21/23 0436 07/20/23 0435   SODIUM mmol/L 145 141   POTASSIUM mmol/L 3.6 3.6   CHLORIDE mmol/L 111* 108*   CO2 mmol/L 26.0 22.0   BUN mg/dL 15 12   CREATININE mg/dL 0.82 0.93   GLUCOSE mg/dL 104* 141*   CALCIUM mg/dL 8.7 9.2       Results from last 7 days   Lab Units 07/21/23 0436 07/20/23 0435   WBC 10*3/mm3 6.85 5.90   HEMOGLOBIN g/dL 7.8* 8.7*   HEMATOCRIT % 23.2* 24.2*   PLATELETS 10*3/mm3 132* 153               No results found for: TROPONINT      Results from last 7 days   Lab Units 07/20/23 0435   ALK PHOS U/L 57   BILIRUBIN mg/dL 0.2   ALT (SGPT) U/L 13   AST (SGOT) U/L 23               No results found for: POCGLU        Past Medical History:   Diagnosis Date    Anemia     Back pain     NUMBNESS/ TINGLINGPAIN DOWN BILAT LOWER EXTREMITIES    Cataract     Chronic kidney disease     STAGE III - FOLLOWED ONLY BY PCP    DDD (degenerative disc disease), lumbar     Female infertility     Gout     Heart murmur     NO PROBLEMS    Hyperlipidemia     Hypertension     RA (rheumatoid arthritis)     NO PROBLEMS WITH FLEX/ EXTENSION       Assessment:  Active Hospital Problems    Diagnosis  POA    **Thoracic spinal stenosis [M48.04]  Yes    Chronic kidney disease [N18.9]  Yes    Heart murmur [R01.1]  Yes    Back pain [M54.9]  Yes    Osteopenia [M85.80]  Yes    Degenerative scoliosis in adult patient [M41.50]  Yes    Idiopathic neuropathy [G60.9]  Yes    HTN (hypertension) [I10]  Yes    RA (rheumatoid arthritis) [M06.9]  Yes    GERD (gastroesophageal reflux disease)  [K21.9]  Yes      Resolved Hospital Problems   No resolved problems to display.       Plan:  Post op care. Follow lab. DC planning. She will need SNU for rehab.    Shayne Wells MD  7/21/2023  14:57 EDT

## 2023-07-21 NOTE — PLAN OF CARE
Goal Outcome Evaluation:              Outcome Evaluation: 67/f POD#2 of thoracic surgery. VSS. Dressing intact. Pt hoping to d/c to rehab, pendning precert. Educated on BP monitroing. Will continue to monitor. Up with assist x1 and voiding per BRP.

## 2023-07-21 NOTE — PROGRESS NOTES
Procedure(s):  T11-12 LAMINECTOMY FUSION     LOS: 2 days     Subjective :   No issues overnight.  Walked hallway with PT yesterday.  Sat at bedside for an hour.      Objective :    Vital signs in last 24 hours:  Vitals:    07/20/23 1829 07/20/23 2055 07/21/23 0239 07/21/23 0538   BP: 102/55 123/66 104/62 115/69   BP Location: Left arm Left arm Left arm Left arm   Patient Position: Lying Lying Lying Lying   Pulse: 69 75 69 62   Resp: 16 16 16 16   Temp: 97.7 °F (36.5 °C) 98.4 °F (36.9 °C) 98 °F (36.7 °C) 98.2 °F (36.8 °C)   TempSrc: Oral Oral Oral Oral   SpO2: 97% 97% 98% 98%   Weight:       Height:           PHYSICAL EXAM:  Patient is calm, in no acute distress, awake and oriented x 3.  .  Patient is neurovascularly intact  Dressing removed  S/S output in drain  Incision dry and intact.    LABS:  Results from last 7 days   Lab Units 07/21/23  0436   WBC 10*3/mm3 6.85   HEMOGLOBIN g/dL 7.8*   HEMATOCRIT % 23.2*   PLATELETS 10*3/mm3 132*       Results from last 7 days   Lab Units 07/21/23  0436   SODIUM mmol/L 145   POTASSIUM mmol/L 3.6   CHLORIDE mmol/L 111*   CO2 mmol/L 26.0   BUN mg/dL 15   CREATININE mg/dL 0.82   GLUCOSE mg/dL 104*   CALCIUM mg/dL 8.7               ASSESSMENT:  Status post Procedure(s):  T11-12 LAMINECTOMY FUSION      Spinal Surgery Levels Completed:1 Level     Plan:    PT/OT  SCDs for DVT ppx  Pain control - tx to oral meds only  Appreciate recs per med hospitalist  Hemovac drain removed today  Acute post op blood loss anemia - hgb 7.8 will continue to monitor.  Blood transfusion if Hgb drops to <7  Dispo - d/c [facility] when bed available     Smith Montgomery MD    Date: 7/21/2023  Time: 07:02 EDT

## 2023-07-21 NOTE — THERAPY TREATMENT NOTE
Patient Name: Leann Andrew  : 1955    MRN: [id number]                              Today's Date: 2023       Admit Date: 2023    Visit Dx:     ICD-10-CM ICD-9-CM   1. Thoracic spinal stenosis  M48.04 724.01     Patient Active Problem List   Diagnosis    RA (rheumatoid arthritis)    GERD (gastroesophageal reflux disease)    HTN (hypertension)    Hypercholesteremia    DDD (degenerative disc disease), lumbar    Screening for colon cancer    Thoracic spinal stenosis    Chronic kidney disease    Heart murmur    Back pain    Osteopenia    Anemia due to chronic illness    Bilateral cataracts    Long-term use of high-risk medication    Lumbar radiculopathy, acute    Menopause    Benign hypertension with chronic kidney disease, stage III    Degenerative scoliosis in adult patient    Idiopathic neuropathy     Past Medical History:   Diagnosis Date    Anemia     Back pain     NUMBNESS/ TINGLINGPAIN DOWN BILAT LOWER EXTREMITIES    Cataract     Chronic kidney disease     STAGE III - FOLLOWED ONLY BY PCP    DDD (degenerative disc disease), lumbar     Female infertility     Gout     Heart murmur     NO PROBLEMS    Hyperlipidemia     Hypertension     RA (rheumatoid arthritis)     NO PROBLEMS WITH FLEX/ EXTENSION     Past Surgical History:   Procedure Laterality Date    COLONOSCOPY N/A 2021    Procedure: COLONOSCOPY INTO CECUM WITH COLD BIOPSY POLYPECTOMIES;  Surgeon: Chuy Bronson Jr., MD;  Location: [facility] ENDOSCOPY;  Service: General;  Laterality: N/A;  PRE- SCREENING  POST- POLYPS    EPIDURAL BLOCK      FOOT SURGERY Right     RHEUMATOID BONE SPURS REMOVED, GRAFT FROM KNEE    HYSTERECTOMY        General Information       Row Name 23 1051          Physical Therapy Time and Intention    Document Type therapy note (daily note)  -MS     Mode of Treatment physical therapy;individual therapy  -MS       Row Name 23 2479          General Information    Patient Profile Reviewed yes  -MS     Existing  Precautions/Restrictions fall;spinal   Exit alarm  -MS     Barriers to Rehab none identified  -MS       Row Name 07/21/23 1053          Cognition    Orientation Status (Cognition) oriented x 3  -MS       Row Name 07/21/23 1053          Safety Issues, Functional Mobility    Comment, Safety Issues/Impairments (Mobility) Gait belt used for safety.  -MS               User Key  (r) = Recorded By, (t) = Taken By, (c) = Cosigned By      Initials Name Provider Type    Dao Patrick, PT Physical Therapist                   Mobility       Row Name 07/21/23 1053          Bed Mobility    Sit-Supine [place] (Bed Mobility) minimum assist (75% patient effort)  -MS     Comment, (Bed Mobility) via logroll  -MS       Row Name 07/21/23 1053          Sit-Stand Transfer    Sit-Stand [place] (Transfers) minimum assist (75% patient effort)  -MS     Assistive Device (Sit-Stand Transfers) walker, front-wheeled  -MS       Row Name 07/21/23 1053          Gait/Stairs (Locomotion)    [place] Level (Gait) contact guard  -MS     Assistive Device (Gait) walker, front-wheeled  -MS     Distance in Feet (Gait) 40 feet  -MS     Deviations/Abnormal Patterns (Gait) aung decreased;stride length decreased  -MS               User Key  (r) = Recorded By, (t) = Taken By, (c) = Cosigned By      Initials Name Provider Type    Dao Patrick, PT Physical Therapist                   Obj/Interventions       Row Name 07/21/23 1054          Motor Skills    Therapeutic Exercise --  BLE ther. ex. program x 10 reps completed (Hip Flexion, LAQ's)  -MS               User Key  (r) = Recorded By, (t) = Taken By, (c) = Cosigned By      Initials Name Provider Type    Dao Patrick, PT Physical Therapist                   Goals/Plan    No documentation.                  Clinical Impression       Row Name 07/21/23 1054          Pain    Pretreatment Pain Rating 1/10  -MS     Posttreatment Pain Rating 1/10  -MS     Pain Location - back  -MS        Row Name 07/21/23 1054          Positioning and Restraints    Pre-Treatment Position bathroom  -MS     Post Treatment Position bed  -MS     In Bed notified nsg;supine;call light within reach;encouraged to call for assist;exit alarm on  -MS               User Key  (r) = Recorded By, (t) = Taken By, (c) = Cosigned By      Initials Name Provider Type    MS Schuler Dao MORENO, PT Physical Therapist                   Outcome Measures       Row Name 07/21/23 1055 07/21/23 0835       How much help from another person do you currently need...    Turning from your back to your side while in flat bed without using bedrails? 3  -MS 3  -LD    Moving from lying on back to sitting on the side of a flat bed without bedrails? 3  -MS 3  -LD    Moving to and from a bed to a chair (including a wheelchair)? 3  -MS 3  -LD    Standing up from a chair using your arms (e.g., wheelchair, bedside chair)? 3  -MS 3  -LD    Climbing 3-5 steps with a railing? 2  -MS 3  -LD    To walk in hospital room? 3  -MS 3  -LD    AM-PAC 6 Clicks Score (PT) 17  -MS 18  -LD    Highest level of mobility 5 --> Static standing  -MS 6 --> Walked 10 steps or more  -LD      Row Name 07/21/23 1055          Functional Assessment    Outcome Measure Options AM-PAC 6 Clicks Basic Mobility (PT)  -MS               User Key  (r) = Recorded By, (t) = Taken By, (c) = Cosigned By      Initials Name Provider Type    MS SchulerDao, PT Physical Therapist    Sarai Sandra RN Registered Nurse                                 Physical Therapy Education       Title: PT OT SLP Therapies (In Progress)       Topic: Physical Therapy (Done)       Point: Mobility training (Done)       Learning Progress Summary             Patient Acceptance, E,D, VU,NR by MS at 7/21/2023 1055    Acceptance, E,D, VU,NR by MS at 7/20/2023 1127                         Point: Home exercise program (Done)       Learning Progress Summary             Patient Acceptance, E,D, VU,NR by MS at  7/21/2023 1055                         Point: Body mechanics (Done)       Learning Progress Summary             Patient Acceptance, E,D, VU,NR by MS at 7/21/2023 1055    Acceptance, E,D, VU,NR by MS at 7/20/2023 1127                         Point: Precautions (Done)       Learning Progress Summary             Patient Acceptance, E,D, VU,NR by MS at 7/21/2023 1055    Acceptance, E,D, VU,NR by MS at 7/20/2023 1127                                         User Key       Initials Effective Dates Name Provider Type Discipline    MS 06/16/21 -  Dao Schuler, PT Physical Therapist PT                  PT Recommendation and Plan  Planned Therapy Interventions (PT): balance training, bed mobility training, gait training, home exercise program, postural re-education, patient/family education, transfer training, strengthening  Plan of Care Reviewed With: patient  Outcome Evaluation: Upon entering room, pt. up in bathroom with nursing staff, awake/alert, and agreeable to work with P.T. despite c/o pain and fatigue. Pt. able to ambulate 40 feet, CGA x 1 with use of Rwx. Pt. requires Min. assist x 1 (via logroll) and Min. assist x 1 for sit <-> stand transfers.  BLE ther. ex. program x 10 reps completed for general strengthening. Verbal/tactile cues given during ambulation for posture correction.  Will continue to progress functional mobility as tolerated.     Time Calculation:         PT Charges       Row Name 07/21/23 1057             Time Calculation    Start Time 0940  -MS      Stop Time 0955  -MS      Time Calculation (min) 15 min  -MS      PT Received On 07/21/23  -MS      PT - Next Appointment 07/22/23  -MS         Time Calculation- PT    Total Timed Code Minutes- PT 14 minute(s)  -MS                User Key  (r) = Recorded By, (t) = Taken By, (c) = Cosigned By      Initials Name Provider Type    MS Dao Schuler, PT Physical Therapist                  Therapy Charges for Today       Code Description Service Date  Service Provider Modifiers Qty    04453724286 HC PT EVAL LOW COMPLEXITY 2 7/20/2023 Dao Schuler, PT GP 1    79538428769 HC PT THERAPEUTIC ACT EA 15 MIN 7/20/2023 Dao Schuler, PT GP 1    10075044629 HC PT THERAPEUTIC ACT EA 15 MIN 7/21/2023 Dao Schuler, PT GP 1            PT G-Codes  Outcome Measure Options: AM-PAC 6 Clicks Basic Mobility (PT)  AM-PAC 6 Clicks Score (PT): 17  AM-PAC 6 Clicks Score (OT): 21  PT Discharge Summary  Anticipated Discharge Disposition (PT): skilled nursing facility    Dao Schuler, PT  7/21/2023

## 2023-07-21 NOTE — CASE MANAGEMENT/SOCIAL WORK
Continued Stay Note  [facility]     Patient Name: Leann Andrew  MRN: [id number]  Today's Date: 7/21/2023    Admit Date: 7/19/2023    Plan: [facility] -- Pending.   Discharge Plan       Row Name 07/21/23 0905       Plan    Plan [facility] -- Pending.    Patient/Family in Agreement with Plan yes    Plan Comments PA supports SNF. Referral sent to the [facility]. Called and left a message for Jenny[facility]. Await SNF's determination.                   Discharge Codes    No documentation.                 Expected Discharge Date and Time       Expected Discharge Date Expected Discharge Time    Jul 21, 2023               Felicia OLIVER RN

## 2023-07-21 NOTE — DISCHARGE INSTRUCTIONS
Generalized Post-Operative Instructions  Pain  It is completely normal to have Post-operative pain, muscle spasm, numbness or tingling. These symptoms should gradually resolve as your muscles and nerves heal. Numbness and tingling are usually the last symptoms to resolve, since nerves take the longest to heal (weeks to months). Gentle Ice packs for the first 5 days to the incision site will help decrease swelling and pain. It is common to have a sore throat after endotracheal intubation. This tenderness should resolve in a few days.      *One rare complication of surgery is internal bleeding. One of the complications of this rare occurrence is compression or increased pressure on your spinal cord. This will cause your symptoms to drastically worsen. This may cause paralysis and is life and limb threatening. Go to an Emergency Room immediately for evaluation.    Call Our Office if you have:  Fever of 101.5 degrees or higher  Difficulty Breathing or Chest Pain  Postural Headache  A sudden increase in severe pain, numbness or weakness  Loss of bowel or bladder control  Drainage from your incision (other than occasional spotting of blood)  If the office is closed and you cannot reach an On-Call Physician, go to the nearest Emergency Room for Evaluation.  General Hygiene  As you know, keeping your incision dry will decrease your chance of developing an infection. You may shower in 5-7 days after your surgery. Please wait 3-4 weeks before soaking in a bathtub or spa. The general rule is if there is still a scab present, you cannot take a bath.    Incision Site  Keep Incision covered with a bandage for the first 3-5 days after surgery. After this time, your incision will need to be looked at daily. Keeping the incision as dry as possible will help your incision stay clean and free of infection. Gently Pat dry the incision, do not rub or add lotion/powder. Let Incision air dry for 1 hour three times daily. It is important  to keep the incision as dry as possible. The dryer your skin, the less likely you are of developing an infection. Some redness around the incision site is normal and should diminish with time.    Indications for a bacterial infection include:  Fever over 101.5 degrees  Rapidly increasing redness around incision site  Foul odor or puss filled discharge from incision site  Incision Closure  Sutures: Most of the time, your incision will be closed with stitches below the surface of the skin. These internal stitches will dissolve on their own, usually within 2-4 weeks. Don't be alarmed if they fall out on their own. If your incision oozes small amounts of blood after surgery, don't be alarmed. Keep initial dressing in place for 3-5 days and reinforce the dressing with new gauze as needed. Once the initial dressing has been removed, change this dressing every 2 days or until your incision is dry. You will also notice small sterile steri-stitches over the incision site as well. These will loosen and fall off in 5-10 days. Once off the skin, you may keep incision uncovered and dry.    Staples: Keep your incision dry, do not attempt to remove the staples. These will be removed at your 2 week post-op appointment.    Diet  We encourage a balanced diet full of fiber to decrease the possibility of constipation. Soft or liquid diet is best tolerated until it is easy to swallow. Over the counter products such as prune juice or Metamucil will help. Use as directed. Drink twice as many fluids as you did prior to surgery, or 6-8 glasses of water daily. A multi-vitamin that includes calcium and iron should be taken as well.    Constipation  Multiple factors in the jeremias-operative time frame can dehydrate the body and increase your chance of constipation. Not eating or drinking the night before surgery, anesthesia and most importantly intravenous and oral narcotic pain medication will cause constipation. As stated above, please drink  plenty of fluids, use over the counter stool softeners and decrease narcotic pain medication when able.    Return To Work  Returning to work varies with occupation and the type of surgery you had performed. This will be discussed with you at your Post-Operative visit.      Follow-up Appointment  In most cases, your post-operative appointment will be 2-4 weeks after your surgery. This will be scheduled by the office staff, please call to confirm your time.    Medication  All of your medications may be refilled during normal office hours (9am-5pm, M-F). However, your medication cannot be refilled on the weekend or after office hours.  Medrol dose pack: If you were told to take oral steroids, make sure you have a prescription before leaving the hospital and take as directed. Occasionally, steroids can increase swelling and irritation to the incision site. Also they may increase your blood glucose levels so if you are diabetic, check your blood sugars religiously.  Pain Medication: Make sure you have a prescription before leaving the hospital and take as directed. Narcotic medication has been shown to be addictive and can lead to tolerance and loss of effectiveness if taken chronically. We encourage you to be proactive in weaning yourself off of narcotic medication and use it as needed. Almost all pain medication will make you constipated so increase your fiber intake and stay hydrated. These medications will make you drowsy SO: Don't operate machinery, drive, or drink alcoholic beverages while on your pain medication.  Muscle Relaxants: Make sure you have a prescription before leaving the hospital and take as directed. These medications will make you drowsy SO: Don't operate machinery, drive, or drink alcoholic beverages while on your pain medication.  Medication to Avoid if you have undergone SPINAL FUSION:  Any Non-Steroidal Anti-Inflammatory  (NSIAD)  Aaron  Celebrex  Feldene  Lodine  Motrin/Ibuprofen  Orudis  Vioxx  Steroids (unless approved by Dr. Montgomery)  *Does not include Tylenol      Smoking:  If you use tobacco products, it is in your best interest to stop. Nicotine constricts blood vessels and decreases blood flow to your operative site. This decreases your bodies' ability to heal and increases your chances of having a poor surgical outcome and chronic pain.    Hard Cervical Collar:  Needs to be worn all the time for 6-12 weeks post-operative, 24 hours a day, 7 days a week or until directed by Dr. Montgomery.    Soft Cervical Collar:  Use as directed by Dr. Montgomery or as needed.    Thoracic Decompression/Fusion Post-Operative Instructions  Activity in General:  If sitting, use only a straight back chair to ensure proper support not to exceed a half hour at a time. Use a rolled up towel behind your lower back for extra support. You may increase your sitting time slowly.  Absolutely no bending, stooping, pushing, lifting or straining.  You may lie in a position of comfort.  Avoid housework, especially vacuuming and sweeping.  OK to cook, as long as you are not lifting anything heavier than 10 pounds.  Learn proper body mechanics to maintain a neutral spine position.  Increasing pain is a red flag telling you to rest.  DO NOT: Engage in strenuous activity for at least 10 weeks after surgery.    1st Week Post-Operative:  Spend half of the day reclined in bed or in a recliner.  May ride in a vehicle as a passenger, but no driving and no extended road trips. Keep the time in the car less than 20 minutes.  Walk around in your home on a smooth, flat surface. Try to be ambulating half of the day.  You may stair climb with assistance.  Do not lift anything heavier than 10 pounds.  Take pain medication as directed, don’t try to “tough it out.” You will heal quicker if you feel better.    2nd Week Post-Operative:  Make sure to make your first post-op  visit.  An exercise program will be discussed at your first visit.  OK to drive less than 20 minutes at a time if tolerated as a passenger.  Increase your walking to 1 mile per day if tolerated well.  Light housework but still no vacuuming or sweeping.  Refrain from sexual activity.    3rd Week Post-Operative:  You may resume sexual activity if relatively pain free.  Increase walking time and distance as tolerated.  Increase light housework as tolerated.  When lifting, do not exceed 25 pounds and use proper body mechanics.  OK to use a hot tub or spa if you are “scab free.”    4th Week Post-Operative:  Gradually increase walking to 1-2 miles daily or as tolerated.  Increase driving time and housework as tolerated.  Return to work will be discussed with Dr. Montgomery and will be patient specific.

## 2023-07-21 NOTE — CASE MANAGEMENT/SOCIAL WORK
Continued Stay Note  [facility]     Patient Name: Leann Andrew  MRN: [id number]  Today's Date: 7/21/2023    Admit Date: 7/19/2023    Plan: [facility] -- Pending.   Discharge Plan       Row Name 07/21/23 1252       Plan    Plan [facility] -- Pending.    Patient/Family in Agreement with Plan yes    Plan Comments Spoke with Jenny/[employer] who is reviewing the case and will f/u with [facility]CP regarding acceptance. Patient will need pre-cert.                   Discharge Codes    No documentation.                 Expected Discharge Date and Time       Expected Discharge Date Expected Discharge Time    Jul 21, 2023               Felicia OLIVER RN

## 2023-07-22 PROBLEM — D69.6 THROMBOCYTOPENIA: Status: ACTIVE | Noted: 2023-07-22

## 2023-07-22 LAB
ANION GAP SERPL CALCULATED.3IONS-SCNC: 8 MMOL/L (ref 5–15)
BASOPHILS # BLD AUTO: 0.01 10*3/MM3 (ref 0–0.2)
BASOPHILS NFR BLD AUTO: 0.1 % (ref 0–1.5)
BUN SERPL-MCNC: 15 MG/DL (ref 8–23)
BUN/CREAT SERPL: 19.7 (ref 7–25)
CALCIUM SPEC-SCNC: 8.9 MG/DL (ref 8.6–10.5)
CHLORIDE SERPL-SCNC: 104 MMOL/L (ref 98–107)
CO2 SERPL-SCNC: 27 MMOL/L (ref 22–29)
CREAT SERPL-MCNC: 0.76 MG/DL (ref 0.57–1)
DEPRECATED RDW RBC AUTO: 56.1 FL (ref 37–54)
EGFRCR SERPLBLD CKD-EPI 2021: 84.9 ML/MIN/1.73
EOSINOPHIL # BLD AUTO: 0.18 10*3/MM3 (ref 0–0.4)
EOSINOPHIL NFR BLD AUTO: 2.6 % (ref 0.3–6.2)
ERYTHROCYTE [DISTWIDTH] IN BLOOD BY AUTOMATED COUNT: 13.9 % (ref 12.3–15.4)
FOLATE BLD-MCNC: 377 NG/ML
FOLATE RBC-MCNC: 1514 NG/ML
GLUCOSE SERPL-MCNC: 125 MG/DL (ref 65–99)
HCT VFR BLD AUTO: 23.2 % (ref 34–46.6)
HCT VFR BLD AUTO: 24.9 % (ref 34–46.6)
HGB BLD-MCNC: 8 G/DL (ref 12–15.9)
LYMPHOCYTES # BLD AUTO: 1.51 10*3/MM3 (ref 0.7–3.1)
LYMPHOCYTES NFR BLD AUTO: 21.9 % (ref 19.6–45.3)
MCH RBC QN AUTO: 38.5 PG (ref 26.6–33)
MCHC RBC AUTO-ENTMCNC: 34.5 G/DL (ref 31.5–35.7)
MCV RBC AUTO: 111.5 FL (ref 79–97)
MONOCYTES # BLD AUTO: 0.82 10*3/MM3 (ref 0.1–0.9)
MONOCYTES NFR BLD AUTO: 11.9 % (ref 5–12)
NEUTROPHILS NFR BLD AUTO: 4.36 10*3/MM3 (ref 1.7–7)
NEUTROPHILS NFR BLD AUTO: 63.1 % (ref 42.7–76)
PLATELET # BLD AUTO: 138 10*3/MM3 (ref 140–450)
PMV BLD AUTO: 9.9 FL (ref 6–12)
POTASSIUM SERPL-SCNC: 3.8 MMOL/L (ref 3.5–5.2)
RBC # BLD AUTO: 2.08 10*6/MM3 (ref 3.77–5.28)
SODIUM SERPL-SCNC: 139 MMOL/L (ref 136–145)
WBC NRBC COR # BLD: 6.91 10*3/MM3 (ref 3.4–10.8)

## 2023-07-22 PROCEDURE — 85025 COMPLETE CBC W/AUTO DIFF WBC: CPT | Performed by: HOSPITALIST

## 2023-07-22 PROCEDURE — 97116 GAIT TRAINING THERAPY: CPT

## 2023-07-22 PROCEDURE — 97530 THERAPEUTIC ACTIVITIES: CPT

## 2023-07-22 PROCEDURE — 80048 BASIC METABOLIC PNL TOTAL CA: CPT | Performed by: HOSPITALIST

## 2023-07-22 RX ADMIN — Medication 10 ML: at 08:41

## 2023-07-22 RX ADMIN — ATORVASTATIN CALCIUM 10 MG: 20 TABLET, FILM COATED ORAL at 08:51

## 2023-07-22 RX ADMIN — CALCIUM CARBONATE-VITAMIN D TAB 500 MG-200 UNIT 1 TABLET: 500-200 TAB at 20:39

## 2023-07-22 RX ADMIN — BISACODYL 10 MG: 10 SUPPOSITORY RECTAL at 12:27

## 2023-07-22 RX ADMIN — CARVEDILOL 25 MG: 25 TABLET, FILM COATED ORAL at 08:51

## 2023-07-22 RX ADMIN — CARVEDILOL 25 MG: 25 TABLET, FILM COATED ORAL at 17:07

## 2023-07-22 RX ADMIN — HYDROCODONE BITARTRATE AND ACETAMINOPHEN 1 TABLET: 5; 325 TABLET ORAL at 21:54

## 2023-07-22 RX ADMIN — Medication 10 ML: at 20:39

## 2023-07-22 RX ADMIN — HYDROCODONE BITARTRATE AND ACETAMINOPHEN 1 TABLET: 5; 325 TABLET ORAL at 02:51

## 2023-07-22 RX ADMIN — AMLODIPINE BESYLATE 10 MG: 10 TABLET ORAL at 08:51

## 2023-07-22 RX ADMIN — HYDROCODONE BITARTRATE AND ACETAMINOPHEN 1 TABLET: 5; 325 TABLET ORAL at 17:07

## 2023-07-22 RX ADMIN — LOSARTAN POTASSIUM 50 MG: 50 TABLET, FILM COATED ORAL at 20:39

## 2023-07-22 RX ADMIN — CALCIUM CARBONATE-VITAMIN D TAB 500 MG-200 UNIT 1 TABLET: 500-200 TAB at 08:51

## 2023-07-22 NOTE — THERAPY TREATMENT NOTE
Acute Care - Physical Therapy Treatment Note  [facility]     Patient Name: Leann Andrew  : 1955  MRN: [id number]  Today's Date: 2023      Visit Dx:     ICD-10-CM ICD-9-CM   1. Thoracic spinal stenosis  M48.04 724.01     Patient Active Problem List   Diagnosis    RA (rheumatoid arthritis)    GERD (gastroesophageal reflux disease)    HTN (hypertension)    Hypercholesteremia    DDD (degenerative disc disease), lumbar    Screening for colon cancer    Thoracic spinal stenosis    Chronic kidney disease    Heart murmur    Back pain    Osteopenia    Anemia due to chronic illness    Bilateral cataracts    Long-term use of high-risk medication    Lumbar radiculopathy, acute    Menopause    Benign hypertension with chronic kidney disease, stage III    Degenerative scoliosis in adult patient    Idiopathic neuropathy     Past Medical History:   Diagnosis Date    Anemia     Back pain     NUMBNESS/ TINGLINGPAIN DOWN BILAT LOWER EXTREMITIES    Cataract     Chronic kidney disease     STAGE III - FOLLOWED ONLY BY PCP    DDD (degenerative disc disease), lumbar     Female infertility     Gout     Heart murmur     NO PROBLEMS    Hyperlipidemia     Hypertension     RA (rheumatoid arthritis)     NO PROBLEMS WITH FLEX/ EXTENSION     Past Surgical History:   Procedure Laterality Date    COLONOSCOPY N/A 2021    Procedure: COLONOSCOPY INTO CECUM WITH COLD BIOPSY POLYPECTOMIES;  Surgeon: Chuy Bronson Jr., MD;  Location: [facility] ENDOSCOPY;  Service: General;  Laterality: N/A;  PRE- SCREENING  POST- POLYPS    EPIDURAL BLOCK      FOOT SURGERY Right     RHEUMATOID BONE SPURS REMOVED, GRAFT FROM KNEE    HYSTERECTOMY       PT Assessment (last 12 hours)       PT Evaluation and Treatment       Row Name 23 1001          Physical Therapy Time and Intention    Subjective Information complains of;pain  Ready to go to rehab center.  RN states that they are still trying to get her placed.  -JR     Document Type therapy note  (daily note)  -JR     Mode of Treatment physical therapy  -JR     Patient Effort good  -JR       Row Name 07/22/23 1001          General Information    Patient Profile Reviewed yes  -JR       Row Name 07/22/23 1001          Living Environment    Current Living Arrangements home  -JR     People in Home spouse  -JR       Row Name 07/22/23 1001          Home Use of Assistive/Adaptive Equipment    Equipment Currently Used at Home walker, rolling  -JR       Row Name 07/22/23 1001          Pain    Pretreatment Pain Rating 3/10  -JR     Posttreatment Pain Rating 4/10  -JR     Pain Location lower  -JR     Pain Location - back  -JR     Pain Intervention(s) Ambulation/increased activity;Repositioned  -JR       Row Name 07/22/23 1001          Strength Comprehensive (MMT)    Comment, General Manual Muscle Testing (MMT) Assessment MMT still 3+/5 throughout all extremities.  -JR       Row Name 07/22/23 1001          Sensory Assessment (Somatosensory)    Sensory Assessment (Somatosensory) sensation intact  -JR       Row Name 07/22/23 1001          Bed Mobility    Supine-Sit Barbour (Bed Mobility) standby assist  -       Row Name 07/22/23 1001          Sit-Stand Transfer    Sit-Stand [place] (Transfers) contact guard  -     Assistive Device (Sit-Stand Transfers) walker, front-wheeled  -JR       Row Name 07/22/23 1001          Gait/Stairs (Locomotion)    Barbour Level (Gait) contact guard  -     Assistive Device (Gait) walker, front-wheeled  -     Distance in Feet (Gait) 350  -     Deviations/Abnormal Patterns (Gait) aung decreased;gait speed decreased;stride length decreased;weight shifting decreased  -JR     Comment, (Gait/Stairs) Ambulated entire distance around unit with 2 rest breaks.  No increase in pain noted.  -JR       Row Name 07/22/23 1001          Safety Issues, Functional Mobility    Impairments Affecting Function (Mobility) endurance/activity tolerance  -       Row Name 07/22/23 1001           Balance    Dynamic Sitting Balance independent  -JR     Static Standing Balance contact guard  -JR     Dynamic Standing Balance contact guard  -JR     Comment, Balance Goo standing balance noted.  -       Row Name 07/22/23 1001          Motor Skills    Therapeutic Exercise --  Hip flexion in sitting x 10 reps. Should elevation x 10 reps in sitting as well.  -JR       Row Name             Wound 07/19/23 0838 thoracic spine Incision    Wound - Properties Group Placement Date: 07/19/23  - Placement Time: 0838 - Present on Hospital Admission: N  -SZ Location: thoracic spine  -SZ Primary Wound Type: Incision  -SZ    Retired Wound - Properties Group Placement Date: 07/19/23  - Placement Time: 0838  -SZ Present on Hospital Admission: N  -SZ Location: thoracic spine  -SZ Primary Wound Type: Incision  -SZ    Retired Wound - Properties Group Date first assessed: 07/19/23  -SZ Time first assessed: 0838 -SZ Present on Hospital Admission: N  -SZ Location: thoracic spine  -SZ Primary Wound Type: Incision  -SZ      Row Name 07/22/23 1001          Plan of Care Review    Plan of Care Reviewed With patient  -JR     Progress improving  -JR     Outcome Evaluation POD #4  -       Row Name 07/22/23 1001          Positioning and Restraints    Pre-Treatment Position in bed  -JR     Post Treatment Position chair  -JR     In Chair notified nsg;reclined;call light within reach;encouraged to call for assist  -JR               User Key  (r) = Recorded By, (t) = Taken By, (c) = Cosigned By      Initials Name Provider Type    Aashish Cordoba PT Physical Therapist    Miranda Weston, RN Registered Nurse                    Physical Therapy Education       Title: PT OT SLP Therapies (In Progress)       Topic: Physical Therapy (Done)       Point: Mobility training (Done)       Learning Progress Summary             Patient Eager, JENELLE JOYCE, REBECA,DU by  at 7/22/2023 1006    Acceptance, E,JENELLE, REBECA,NR by MS at 7/21/2023 1055    Acceptance,  E,D, VU,NR by MS at 7/20/2023 1127                         Point: Home exercise program (Done)       Learning Progress Summary             Patient Eager, E,D, VU,DU by JR at 7/22/2023 1006    Acceptance, E,D, VU,NR by MS at 7/21/2023 1055                         Point: Body mechanics (Done)       Learning Progress Summary             Patient Eager, E,D, VU,DU by JR at 7/22/2023 1006    Acceptance, E,D, VU,NR by MS at 7/21/2023 1055    Acceptance, E,D, VU,NR by MS at 7/20/2023 1127                         Point: Precautions (Done)       Learning Progress Summary             Patient Eager, E,D, VU,DU by JR at 7/22/2023 1006    Acceptance, E,D, VU,NR by MS at 7/21/2023 1055    Acceptance, E,D, VU,NR by MS at 7/20/2023 1127                                         User Key       Initials Effective Dates Name Provider Type Discipline    MS 06/16/21 -  Dao Schuler, PT Physical Therapist PT     06/16/21 -  Aashish Abreu, PT Physical Therapist PT                  PT Recommendation and Plan     Plan of Care Reviewed With: patient  Progress: improving  Outcome Evaluation: Patient is very close to completing PT goals.  Ambulated much further today without an increase in pain.   Outcome Measures       Row Name 07/22/23 1008             How much help from another person do you currently need...    Turning from your back to your side while in flat bed without using bedrails? 4  -JR      Moving from lying on back to sitting on the side of a flat bed without bedrails? 4  -JR      Moving to and from a bed to a chair (including a wheelchair)? 3  -JR      Standing up from a chair using your arms (e.g., wheelchair, bedside chair)? 3  -JR      Climbing 3-5 steps with a railing? 3  -JR      To walk in hospital room? 3  -JR      AM-PAC 6 Clicks Score (PT) 20  -JR                User Key  (r) = Recorded By, (t) = Taken By, (c) = Cosigned By      Initials Name Provider Type     Aashish Abreu, PT Physical Therapist                      Time Calculation:    PT Charges       Row Name 07/22/23 1008             Time Calculation    Start Time 0905  -JR      Stop Time 0927  -      Time Calculation (min) 22 min  -JR      PT Received On 07/22/23  -      PT - Next Appointment 07/23/23  -                User Key  (r) = Recorded By, (t) = Taken By, (c) = Cosigned By      Initials Name Provider Type    Aashish Cordoba, PT Physical Therapist                  Therapy Charges for Today       Code Description Service Date Service Provider Modifiers Qty    36922625614 HC PT THERAPEUTIC ACT EA 15 MIN 7/22/2023 Aashish Abreu, PT GP 1    [id number] HC GAIT TRAINING EA 15 MIN 7/22/2023 Aashish Abreu, PT GP 1            PT G-Codes  Outcome Measure Options: AM-PAC 6 Clicks Basic Mobility (PT)  AM-PAC 6 Clicks Score (PT): 20  AM-PAC 6 Clicks Score (OT): 21    Aashish Abreu PT  7/22/2023

## 2023-07-22 NOTE — PLAN OF CARE
Goal Outcome Evaluation:  Plan of Care Reviewed With: patient        Progress: improving  Outcome Evaluation: Patient is very close to completing PT goals.  Ambulated much further today without an increase in pain.

## 2023-07-22 NOTE — PLAN OF CARE
Goal Outcome Evaluation:           Progress: improving  Outcome Evaluation: POD3 T11-T12 lami and fusion, dressing c/d/i. Pt A&Ox, VSS, on room air, stand by assist when ambulating, voiding per brp, pain controlled with prn oral pain medication. Pt plan pending rehab

## 2023-07-22 NOTE — PROGRESS NOTES
Procedure(s):  T11-12 LAMINECTOMY FUSION     LOS: 3 days     Subjective :   No issues overnight.  Pain controlled.  Walked with PT yesterday.  No BM yet. Passing gas.      Objective :    Vital signs in last 24 hours:  Vitals:    07/21/23 2010 07/21/23 2126 07/22/23 0514 07/22/23 0700   BP: 107/62 105/61 96/59 127/67   BP Location: Left arm Left arm Left arm Left arm   Patient Position: Lying Lying Lying Lying   Pulse: 72 73 74 83   Resp: 16 16 16 16   Temp: 97.8 °F (36.6 °C) 97.9 °F (36.6 °C) 98.1 °F (36.7 °C) 98.1 °F (36.7 °C)   TempSrc: Oral Oral Oral Oral   SpO2: 96% 97% 95% 96%   Weight:       Height:           PHYSICAL EXAM:  Patient is calm, in no acute distress, awake and oriented x 3.  .  Patient is neurovascularly intact  Abdomen non distended    LABS:  Results from last 7 days   Lab Units 07/22/23  0341   WBC 10*3/mm3 6.91   HEMOGLOBIN g/dL 8.0*   HEMATOCRIT % 23.2*   PLATELETS 10*3/mm3 138*       Results from last 7 days   Lab Units 07/22/23  0341   SODIUM mmol/L 139   POTASSIUM mmol/L 3.8   CHLORIDE mmol/L 104   CO2 mmol/L 27.0   BUN mg/dL 15   CREATININE mg/dL 0.76   GLUCOSE mg/dL 125*   CALCIUM mg/dL 8.9               ASSESSMENT:  Status post Procedure(s):  T11-12 LAMINECTOMY FUSION      Spinal Surgery Levels Completed:1 Level     Plan:    PT/OT  SCDs for DVT ppx  Pain control - tx to oral meds only  Bowel regimen   Appreciate recs per med hospitalist  Hemovac drain removed yesterday  Acute post op blood loss anemia - hgb 8.0 will continue to monitor.  Blood transfusion if Hgb drops to <7  Dispo - d/c [facility] when bed available    Smith Montgomery MD    Date: 7/22/2023  Time: 08:30 EDT

## 2023-07-22 NOTE — PROGRESS NOTES
"DAILY PROGRESS NOTE  Psychiatric    Patient Identification:  Name: Leann Andrew  Age: 67 y.o.  Sex: female  :  1955  MRN: [id number]         Primary Care Physician: Nick Erwin MD    Subjective:  Interval History:She complains of back pain but improved today.  She has been more active today with therapy using rolling walker.  Denies any syncope, chest pain, shortness of breath, nausea, vomiting, or abdominal pain.  She was able to have a bowel movement earlier today.  Appetite fair.  Pulling around 1500 on incentive spirometer    Objective:    Scheduled Meds:amLODIPine, 10 mg, Oral, Daily  atorvastatin, 10 mg, Oral, Daily  calcium 500 mg vitamin D 5 mcg (200 UT), 1 tablet, Oral, BID  carvedilol, 25 mg, Oral, BID With Meals  losartan, 50 mg, Oral, Nightly  sodium chloride, 10 mL, Intravenous, Q12H  triamterene-hydrochlorothiazide, 1 tablet, Oral, Every Other Day      Continuous Infusions:lactated ringers, 9 mL/hr, Last Rate: Stopped (23 0816)        Vital signs in last 24 hours:  Temp:  [97.8 °F (36.6 °C)-98.4 °F (36.9 °C)] 98.4 °F (36.9 °C)  Heart Rate:  [72-83] 78  Resp:  [16] 16  BP: ()/(59-67) 117/64    Intake/Output:    Intake/Output Summary (Last 24 hours) at 2023 1637  Last data filed at 2023 0514  Gross per 24 hour   Intake 820 ml   Output --   Net 820 ml       Exam:  /64 (BP Location: Left arm, Patient Position: Lying)   Pulse 78   Temp 98.4 °F (36.9 °C) (Oral)   Resp 16   Ht 162.6 cm (64.02\")   Wt 70.8 kg (156 lb 1.4 oz)   LMP  (LMP Unknown)   SpO2 96%   BMI 26.78 kg/m²     General Appearance:    Alert, cooperative, no distress   Head:    Normocephalic, without obvious abnormality, atraumatic   Eyes:  Sclera nonicteric   Throat:   Lips, tongue, gums normal   Neck:   Supple, symmetrical, trachea midline, no JVD   Lungs:     Clear to auscultation bilaterally, respirations unlabored   Chest Wall:    No tenderness or deformity    " Heart:    Regular rate and rhythm, S1 and S2 normal, no murmur,no  Rub or gallop   Abdomen:     Soft, nontender, bowel sounds active, no masses, no organomegaly    Extremities:   Extremities normal, atraumatic, no cyanosis or edema   Pulses:      Skin:   Skin is warm and dry,  no rashes or palpable lesions   Neurologic:   no focal deficits noted      Lab Results (last 72 hours)       Procedure Component Value Units Date/Time    Vitamin B12 [[id number]]  (Normal) Collected: 07/20/23 0435    Specimen: Blood Updated: 07/20/23 0555     Vitamin B-12 326 pg/mL     Narrative:      Results may be falsely increased if patient taking Biotin.      Comprehensive Metabolic Panel [[id number]]  (Abnormal) Collected: 07/20/23 0435    Specimen: Blood Updated: 07/20/23 0541     Glucose 141 mg/dL      BUN 12 mg/dL      Creatinine 0.93 mg/dL      Sodium 141 mmol/L      Potassium 3.6 mmol/L      Chloride 108 mmol/L      CO2 22.0 mmol/L      Calcium 9.2 mg/dL      Total Protein 5.6 g/dL      Albumin 3.9 g/dL      ALT (SGPT) 13 U/L      AST (SGOT) 23 U/L      Alkaline Phosphatase 57 U/L      Total Bilirubin 0.2 mg/dL      Globulin 1.7 gm/dL      A/G Ratio 2.3 g/dL      BUN/Creatinine Ratio 12.9     Anion Gap 11.0 mmol/L      eGFR 66.7 mL/min/1.73     Narrative:      GFR Normal >60  Chronic Kidney Disease <60  Kidney Failure <15      CBC & Differential [[id number]]  (Abnormal) Collected: 07/20/23 0435    Specimen: Blood Updated: 07/20/23 0535    Narrative:      The following orders were created for panel order CBC & Differential.  Procedure                               Abnormality         Status                     ---------                               -----------         ------                     CBC Auto Differential[[id number]]        Abnormal            Final result                 Please view results for these tests on the individual orders.    CBC Auto Differential [[id number]]  (Abnormal) Collected: 07/20/23 0435    Specimen: Blood  Updated: 07/20/23 0535     WBC 5.90 10*3/mm3      RBC 2.25 10*6/mm3      Hemoglobin 8.7 g/dL      Hematocrit 24.2 %      .6 fL      MCH 38.7 pg      MCHC 36.0 g/dL      RDW 14.1 %      RDW-SD 54.6 fl      MPV 10.1 fL      Platelets 153 10*3/mm3      Neutrophil % 83.6 %      Lymphocyte % 7.3 %      Monocyte % 8.6 %      Eosinophil % 0.0 %      Basophil % 0.0 %      Neutrophils, Absolute 4.93 10*3/mm3      Lymphocytes, Absolute 0.43 10*3/mm3      Monocytes, Absolute 0.51 10*3/mm3      Eosinophils, Absolute 0.00 10*3/mm3      Basophils, Absolute 0.00 10*3/mm3     Folate RBC [[id number]] Collected: 07/20/23 0435    Specimen: Blood Updated: 07/20/23 0517          Data Review:  Results from last 7 days   Lab Units 07/22/23  0341 07/21/23 0436 07/20/23 0435   SODIUM mmol/L 139 145 141   POTASSIUM mmol/L 3.8 3.6 3.6   CHLORIDE mmol/L 104 111* 108*   CO2 mmol/L 27.0 26.0 22.0   BUN mg/dL 15 15 12   CREATININE mg/dL 0.76 0.82 0.93   GLUCOSE mg/dL 125* 104* 141*   CALCIUM mg/dL 8.9 8.7 9.2     Results from last 7 days   Lab Units 07/22/23  0341 07/21/23 0436 07/20/23 0435   WBC 10*3/mm3 6.91 6.85 5.90   HEMOGLOBIN g/dL 8.0* 7.8* 8.7*   HEMATOCRIT % 23.2* 23.2* 24.2*  24.9*   PLATELETS 10*3/mm3 138* 132* 153             No results found for: TROPONINT      Results from last 7 days   Lab Units 07/20/23 0435   ALK PHOS U/L 57   BILIRUBIN mg/dL 0.2   ALT (SGPT) U/L 13   AST (SGOT) U/L 23       Current Facility-Administered Medications:     amLODIPine (NORVASC) tablet 10 mg, 10 mg, Oral, Daily, Smith Montgomery MD, 10 mg at 07/22/23 0851    atorvastatin (LIPITOR) tablet 10 mg, 10 mg, Oral, Daily, Smith Montgomery MD, 10 mg at 07/22/23 0851    bisacodyl (DULCOLAX) EC tablet 5 mg, 5 mg, Oral, Daily PRN, Smith Montgomery MD, 5 mg at 07/21/23 0834    bisacodyl (DULCOLAX) suppository 10 mg, 10 mg, Rectal, Daily PRN, Smith Montgomery MD, 10 mg at 07/22/23 1227    calcium 500 mg vitamin D 5 mcg (200 UT) per tablet 1 tablet, 1  tablet, Oral, BID, Shalini Ghotra MD, 1 tablet at 07/22/23 0851    carvedilol (COREG) tablet 25 mg, 25 mg, Oral, BID With Meals, Smith Montgomery MD, 25 mg at 07/22/23 1707    docusate sodium (COLACE) capsule 100 mg, 100 mg, Oral, BID PRN, Smith Montgomery MD, 100 mg at 07/21/23 0835    HYDROcodone-acetaminophen (NORCO)  MG per tablet 1 tablet, 1 tablet, Oral, Q4H PRN, Smith Montgomery MD, 1 tablet at 07/20/23 0306    HYDROcodone-acetaminophen (NORCO) 5-325 MG per tablet 1 tablet, 1 tablet, Oral, Q4H PRN, Smith Montgomery MD, 1 tablet at 07/22/23 1707    HYDROmorphone (DILAUDID) injection 0.5 mg, 0.5 mg, Intravenous, Q2H PRN **AND** naloxone (NARCAN) injection 0.4 mg, 0.4 mg, Intravenous, Q5 Min PRN, Smith Montgomery MD    lactated ringers infusion, 9 mL/hr, Intravenous, Continuous, Smith Montgomery MD, Stopped at 07/20/23 0816    losartan (COZAAR) tablet 50 mg, 50 mg, Oral, Nightly, Smith Montgomery MD, 50 mg at 07/21/23 2013    magnesium hydroxide (MILK OF MAGNESIA) 400 MG/5ML suspension 15 mL, 15 mL, Oral, Daily PRN, Smith Montgomery MD    melatonin tablet 5 mg, 5 mg, Oral, Nightly PRN, Smith Montgomery MD    morphine injection 1 mg, 1 mg, Intravenous, Q4H PRN **AND** naloxone (NARCAN) injection 0.4 mg, 0.4 mg, Intravenous, Q5 Min PRN, Smith Montgomery MD    ondansetron (ZOFRAN) injection 4 mg, 4 mg, Intravenous, Q6H PRN, Smith Montgomery MD    polyethylene glycol (MIRALAX) packet 17 g, 17 g, Oral, Daily PRN, Smith Montgomery MD, 17 g at 07/21/23 2016    sennosides-docusate (PERICOLACE) 8.6-50 MG per tablet 1 tablet, 1 tablet, Oral, Nightly PRN, Smith Montgomery MD    sodium chloride 0.9 % flush 10 mL, 10 mL, Intravenous, Q12H, Smith Montgomery MD, 10 mL at 07/22/23 0841    sodium chloride 0.9 % flush 10 mL, 10 mL, Intravenous, PRN, Smith Montgomery MD, 10 mL at 07/20/23 1007    sodium chloride 0.9 % infusion 40 mL, 40 mL, Intravenous, PRN, Smith Montgomery MD    triamterene-hydrochlorothiazide (MAXZIDE-25)  37.5-25 MG per tablet 1 tablet, 1 tablet, Oral, Every Other Day, Smith Montgomery MD, 1 tablet at 07/21/23 0835            No results found for: POCGLU        Past Medical History:   Diagnosis Date    Anemia     Back pain     NUMBNESS/ TINGLINGPAIN DOWN BILAT LOWER EXTREMITIES    Cataract     Chronic kidney disease     STAGE III - FOLLOWED ONLY BY PCP    DDD (degenerative disc disease), lumbar     Female infertility     Gout     Heart murmur     NO PROBLEMS    Hyperlipidemia     Hypertension     RA (rheumatoid arthritis)     NO PROBLEMS WITH FLEX/ EXTENSION       Assessment:  Active Hospital Problems    Diagnosis  POA    **Thoracic spinal stenosis [M48.04]  Yes    Chronic kidney disease [N18.9]  Yes    Heart murmur [R01.1]  Yes    Back pain [M54.9]  Yes    Osteopenia [M85.80]  Yes    Degenerative scoliosis in adult patient [M41.50]  Yes    Idiopathic neuropathy [G60.9]  Yes    HTN (hypertension) [I10]  Yes    RA (rheumatoid arthritis) [M06.9]  Yes    GERD (gastroesophageal reflux disease) [K21.9]  Yes      Resolved Hospital Problems   No resolved problems to display.       Plan:  Status post T11-T12 laminectomy fusion postop day 3.  Continue to supportive postop care.  Was able to successfully have a bowel movement earlier today.  Is participating in physical therapy with improvement in symptoms.  RN reports that she plans to stay through Monday to continue to gain strength without current plans to go to rehab.  BMP reviewed -noted only slight increase in glucose 125 otherwise unremarkable.  CBC does show anemia hemoglobin 8 with slight trend up from 7.8.  White count remains normal.  Platelet count is down at 138,000 which is improved from 132,000.  Continue to monitor.  Repeat CBC in sophie Younger, SANAM  7/22/2023  16:37 EDT

## 2023-07-22 NOTE — PLAN OF CARE
Goal Outcome Evaluation:           Progress: improving  Outcome Evaluation: This RN took over care at 1500. 66 y/o s/POD3 T11-T12 lami fusion. AOX4. Pt up w/ assist x1, ambulating in hallways. Voiding function intact. Neuro checks WNL, no c/o numbness/tingling. Bordered dsg w/ scant drainage. Small BM had this shift after suppository. Hemoglobing stabilizing. PIV saline locked. Needs met. VSS. RA. Educated pt on insurance approval. Plan to d/c to [facility], awaiting pre-cert and will be out of pocket per pt. Will CTM.

## 2023-07-23 LAB
BASOPHILS # BLD AUTO: 0.02 10*3/MM3 (ref 0–0.2)
BASOPHILS NFR BLD AUTO: 0.3 % (ref 0–1.5)
DEPRECATED RDW RBC AUTO: 57 FL (ref 37–54)
EOSINOPHIL # BLD AUTO: 0.23 10*3/MM3 (ref 0–0.4)
EOSINOPHIL NFR BLD AUTO: 3.8 % (ref 0.3–6.2)
ERYTHROCYTE [DISTWIDTH] IN BLOOD BY AUTOMATED COUNT: 14.1 % (ref 12.3–15.4)
HCT VFR BLD AUTO: 24.3 % (ref 34–46.6)
HGB BLD-MCNC: 8.4 G/DL (ref 12–15.9)
LYMPHOCYTES # BLD AUTO: 1.7 10*3/MM3 (ref 0.7–3.1)
LYMPHOCYTES NFR BLD AUTO: 28.1 % (ref 19.6–45.3)
MCH RBC QN AUTO: 38.4 PG (ref 26.6–33)
MCHC RBC AUTO-ENTMCNC: 34.6 G/DL (ref 31.5–35.7)
MCV RBC AUTO: 111 FL (ref 79–97)
MONOCYTES # BLD AUTO: 0.67 10*3/MM3 (ref 0.1–0.9)
MONOCYTES NFR BLD AUTO: 11.1 % (ref 5–12)
NEUTROPHILS NFR BLD AUTO: 3.42 10*3/MM3 (ref 1.7–7)
NEUTROPHILS NFR BLD AUTO: 56.4 % (ref 42.7–76)
PLATELET # BLD AUTO: 150 10*3/MM3 (ref 140–450)
PMV BLD AUTO: 10 FL (ref 6–12)
RBC # BLD AUTO: 2.19 10*6/MM3 (ref 3.77–5.28)
WBC NRBC COR # BLD: 6.06 10*3/MM3 (ref 3.4–10.8)

## 2023-07-23 PROCEDURE — 85025 COMPLETE CBC W/AUTO DIFF WBC: CPT | Performed by: NURSE PRACTITIONER

## 2023-07-23 RX ADMIN — CARVEDILOL 25 MG: 25 TABLET, FILM COATED ORAL at 08:47

## 2023-07-23 RX ADMIN — ATORVASTATIN CALCIUM 10 MG: 20 TABLET, FILM COATED ORAL at 08:47

## 2023-07-23 RX ADMIN — HYDROCODONE BITARTRATE AND ACETAMINOPHEN 1 TABLET: 5; 325 TABLET ORAL at 21:57

## 2023-07-23 RX ADMIN — HYDROCODONE BITARTRATE AND ACETAMINOPHEN 1 TABLET: 5; 325 TABLET ORAL at 05:47

## 2023-07-23 RX ADMIN — Medication 10 ML: at 08:48

## 2023-07-23 RX ADMIN — TRIAMTERENE AND HYDROCHLOROTHIAZIDE 1 TABLET: 37.5; 25 TABLET ORAL at 08:47

## 2023-07-23 RX ADMIN — HYDROCODONE BITARTRATE AND ACETAMINOPHEN 1 TABLET: 5; 325 TABLET ORAL at 12:59

## 2023-07-23 RX ADMIN — Medication 10 ML: at 20:20

## 2023-07-23 RX ADMIN — AMLODIPINE BESYLATE 10 MG: 10 TABLET ORAL at 08:46

## 2023-07-23 RX ADMIN — CALCIUM CARBONATE-VITAMIN D TAB 500 MG-200 UNIT 1 TABLET: 500-200 TAB at 20:20

## 2023-07-23 RX ADMIN — CALCIUM CARBONATE-VITAMIN D TAB 500 MG-200 UNIT 1 TABLET: 500-200 TAB at 08:47

## 2023-07-23 RX ADMIN — CARVEDILOL 25 MG: 25 TABLET, FILM COATED ORAL at 17:05

## 2023-07-23 NOTE — PLAN OF CARE
Goal Outcome Evaluation:           Progress: improving  Outcome Evaluation: 66 y/o s/POD 4 T11-T12 lami fusion. AOX4. Pt up w/ assist x1. Voiding function intact. Neuro checks WNL, no c/o numbness/tingling. Bordered dsg w/ scant drainage. Hemoglobin remains stable. PIV saline locked. Needs met. VSS. RA. Educated pt on falls precautions. Plan to d/c to [place], awaiting pre-cert and will be out of pocket per pt. Will CTM.

## 2023-07-23 NOTE — PROGRESS NOTES
Procedure(s):  T11-12 LAMINECTOMY FUSION     LOS: 4 days     Subjective :   No issues overnight.  Pain controlled on oral meds.  Walked complete loop around hallway with PT.  +BM yesterday    Objective :    Vital signs in last 24 hours:  Vitals:    07/22/23 1840 07/22/23 2037 07/22/23 2206 07/23/23 0559   BP: 101/56 106/61 100/60 108/62   BP Location: Left arm Left arm Left arm Left arm   Patient Position: Lying Lying Lying Lying   Pulse: 81 76 71 72   Resp: 16 16 16 16   Temp: 97.9 °F (36.6 °C) 98.2 °F (36.8 °C) 98.4 °F (36.9 °C) 98 °F (36.7 °C)   TempSrc: Oral Oral Oral Oral   SpO2: 96% 99% 96% 97%   Weight:       Height:           PHYSICAL EXAM:  Patient is calm, in no acute distress, awake and oriented x 3.  .  Patient is neurovascularly intact      LABS:  Results from last 7 days   Lab Units 07/23/23  0330   WBC 10*3/mm3 6.06   HEMOGLOBIN g/dL 8.4*   HEMATOCRIT % 24.3*   PLATELETS 10*3/mm3 150       Results from last 7 days   Lab Units 07/22/23  0341   SODIUM mmol/L 139   POTASSIUM mmol/L 3.8   CHLORIDE mmol/L 104   CO2 mmol/L 27.0   BUN mg/dL 15   CREATININE mg/dL 0.76   GLUCOSE mg/dL 125*   CALCIUM mg/dL 8.9               ASSESSMENT:  Status post Procedure(s):  T11-12 LAMINECTOMY FUSION      Spinal Surgery Levels Completed:1 Level     Plan:    PT/OT  SCDs for DVT ppx  Pain control - tolerating PO meds  Bowel regimen  - + BM yesterday  Appreciate recs per med hospitalist  Acute post op blood loss anemia - hgb 8.4(8.0) will continue to monitor.  Blood transfusion if Hgb drops to <7  Dispo - d/c [facility] when bed available    Smith Montgomery MD    Date: 7/23/2023  Time: 10:56 EDT

## 2023-07-23 NOTE — PLAN OF CARE
Goal Outcome Evaluation:           Progress: improving  Outcome Evaluation: POD4 T11-T12 lami and fusion, dressing c/d/i. Pt A&Ox4, VSS, on room air, voiding per brp, stand by assist when ambulating, pain controlled with prn oral pain medication. Pt plan pending rehab precert

## 2023-07-23 NOTE — PROGRESS NOTES
"DAILY PROGRESS NOTE  [facility]    Patient Identification:  Name: Leann Andrew  Age: 67 y.o.  Sex: female  :  1955  MRN: [id number]         Primary Care Physician: Nick Erwin MD    Subjective:  Interval History: back pain continues to improve.  She is getting up some on her own but remains weak.  Denies chest pain, pressure, shoa, n/v, abd pain, syncope, or near synope.  BM yesterday and today.  Appetite fair.  Pulling around 1500 on incentive spirometer    Objective:    Scheduled Meds:amLODIPine, 10 mg, Oral, Daily  atorvastatin, 10 mg, Oral, Daily  calcium 500 mg vitamin D 5 mcg (200 UT), 1 tablet, Oral, BID  carvedilol, 25 mg, Oral, BID With Meals  losartan, 50 mg, Oral, Nightly  sodium chloride, 10 mL, Intravenous, Q12H  triamterene-hydrochlorothiazide, 1 tablet, Oral, Every Other Day      Continuous Infusions:       Vital signs in last 24 hours:  Temp:  [97.9 °F (36.6 °C)-98.4 °F (36.9 °C)] 98 °F (36.7 °C)  Heart Rate:  [71-85] 85  Resp:  [16-19] 19  BP: ()/(56-64) 98/57    Intake/Output:    Intake/Output Summary (Last 24 hours) at 2023 1419  Last data filed at 2023 0600  Gross per 24 hour   Intake 1030 ml   Output --   Net 1030 ml       Exam:  BP 98/57   Pulse 85   Temp 98 °F (36.7 °C) (Oral)   Resp 19   Ht 162.6 cm (64.02\")   Wt 70.8 kg (156 lb 1.4 oz)   LMP  (LMP Unknown)   SpO2 94%   BMI 26.78 kg/m²     General Appearance:    Alert, cooperative, no distress   Head:    Normocephalic, without obvious abnormality, atraumatic   Eyes:    Sclera nonicteric   Throat:   Lips, tongue, gums normal   Neck:   Supple, symmetrical, trachea midline, no JVD   Lungs:     Clear to auscultation bilaterally, respirations unlabored   Chest Wall:    No tenderness or deformity    Heart:    Regular rate and rhythm, S1 and S2 normal, soft VALERIO LSB.  no  Rub or gallop   Abdomen:     Soft, nontender, bowel sounds active, no masses, no organomegaly    Extremities:   " Extremities normal, atraumatic, no cyanosis or edema   Pulses:   2+ PT/DP/ radial.    Skin:   Skin is warm and dry,  no rashes or palpable lesions dressing to mid thoracic spin C/D/I   Neurologic:   no focal deficits noted      Lab Results (last 72 hours)       Procedure Component Value Units Date/Time    Vitamin B12 [[id number]]  (Normal) Collected: 07/20/23 0435    Specimen: Blood Updated: 07/20/23 0555     Vitamin B-12 326 pg/mL     Narrative:      Results may be falsely increased if patient taking Biotin.      Comprehensive Metabolic Panel [[id number]]  (Abnormal) Collected: 07/20/23 0435    Specimen: Blood Updated: 07/20/23 0541     Glucose 141 mg/dL      BUN 12 mg/dL      Creatinine 0.93 mg/dL      Sodium 141 mmol/L      Potassium 3.6 mmol/L      Chloride 108 mmol/L      CO2 22.0 mmol/L      Calcium 9.2 mg/dL      Total Protein 5.6 g/dL      Albumin 3.9 g/dL      ALT (SGPT) 13 U/L      AST (SGOT) 23 U/L      Alkaline Phosphatase 57 U/L      Total Bilirubin 0.2 mg/dL      Globulin 1.7 gm/dL      A/G Ratio 2.3 g/dL      BUN/Creatinine Ratio 12.9     Anion Gap 11.0 mmol/L      eGFR 66.7 mL/min/1.73     Narrative:      GFR Normal >60  Chronic Kidney Disease <60  Kidney Failure <15      CBC & Differential [[id number]]  (Abnormal) Collected: 07/20/23 0435    Specimen: Blood Updated: 07/20/23 0535    Narrative:      The following orders were created for panel order CBC & Differential.  Procedure                               Abnormality         Status                     ---------                               -----------         ------                     CBC Auto Differential[[id number]]        Abnormal            Final result                 Please view results for these tests on the individual orders.    CBC Auto Differential [[id number]]  (Abnormal) Collected: 07/20/23 0435    Specimen: Blood Updated: 07/20/23 0535     WBC 5.90 10*3/mm3      RBC 2.25 10*6/mm3      Hemoglobin 8.7 g/dL      Hematocrit 24.2 %      MCV  107.6 fL      MCH 38.7 pg      MCHC 36.0 g/dL      RDW 14.1 %      RDW-SD 54.6 fl      MPV 10.1 fL      Platelets 153 10*3/mm3      Neutrophil % 83.6 %      Lymphocyte % 7.3 %      Monocyte % 8.6 %      Eosinophil % 0.0 %      Basophil % 0.0 %      Neutrophils, Absolute 4.93 10*3/mm3      Lymphocytes, Absolute 0.43 10*3/mm3      Monocytes, Absolute 0.51 10*3/mm3      Eosinophils, Absolute 0.00 10*3/mm3      Basophils, Absolute 0.00 10*3/mm3     Folate RBC [[id number]] Collected: 07/20/23 0435    Specimen: Blood Updated: 07/20/23 0517          Data Review:  Results from last 7 days   Lab Units 07/22/23  0341 07/21/23  0436 07/20/23  0435   SODIUM mmol/L 139 145 141   POTASSIUM mmol/L 3.8 3.6 3.6   CHLORIDE mmol/L 104 111* 108*   CO2 mmol/L 27.0 26.0 22.0   BUN mg/dL 15 15 12   CREATININE mg/dL 0.76 0.82 0.93   GLUCOSE mg/dL 125* 104* 141*   CALCIUM mg/dL 8.9 8.7 9.2     Results from last 7 days   Lab Units 07/23/23  0330 07/22/23  0341 07/21/23  0436   WBC 10*3/mm3 6.06 6.91 6.85   HEMOGLOBIN g/dL 8.4* 8.0* 7.8*   HEMATOCRIT % 24.3* 23.2* 23.2*   PLATELETS 10*3/mm3 150 138* 132*             No results found for: TROPONINT      Results from last 7 days   Lab Units 07/20/23  0435   ALK PHOS U/L 57   BILIRUBIN mg/dL 0.2   ALT (SGPT) U/L 13   AST (SGOT) U/L 23       Current Facility-Administered Medications:     amLODIPine (NORVASC) tablet 10 mg, 10 mg, Oral, Daily, Smith Montgomery MD, 10 mg at 07/23/23 0846    atorvastatin (LIPITOR) tablet 10 mg, 10 mg, Oral, Daily, Smith Montgomery MD, 10 mg at 07/23/23 0847    bisacodyl (DULCOLAX) EC tablet 5 mg, 5 mg, Oral, Daily PRN, Smith Montgomery MD, 5 mg at 07/21/23 0834    bisacodyl (DULCOLAX) suppository 10 mg, 10 mg, Rectal, Daily PRN, Smith Montgomery MD, 10 mg at 07/22/23 1227    calcium 500 mg vitamin D 5 mcg (200 UT) per tablet 1 tablet, 1 tablet, Oral, BID, Shalini Ghotra MD, 1 tablet at 07/23/23 0847    carvedilol (COREG) tablet 25 mg, 25 mg, Oral, BID With Meals,  Smith Montgomery MD, 25 mg at 07/23/23 0847    docusate sodium (COLACE) capsule 100 mg, 100 mg, Oral, BID PRN, Smith Montgomery MD, 100 mg at 07/21/23 0835    HYDROcodone-acetaminophen (NORCO)  MG per tablet 1 tablet, 1 tablet, Oral, Q4H PRN, Smith Montgomery MD, 1 tablet at 07/20/23 0306    HYDROcodone-acetaminophen (NORCO) 5-325 MG per tablet 1 tablet, 1 tablet, Oral, Q4H PRN, Smith Montgomery MD, 1 tablet at 07/23/23 1259    HYDROmorphone (DILAUDID) injection 0.5 mg, 0.5 mg, Intravenous, Q2H PRN **AND** naloxone (NARCAN) injection 0.4 mg, 0.4 mg, Intravenous, Q5 Min PRN, Smith Montgomery MD    losartan (COZAAR) tablet 50 mg, 50 mg, Oral, Nightly, Smith Montgomery MD, 50 mg at 07/22/23 2039    magnesium hydroxide (MILK OF MAGNESIA) 400 MG/5ML suspension 15 mL, 15 mL, Oral, Daily PRN, Smith Montgomery MD    melatonin tablet 5 mg, 5 mg, Oral, Nightly PRN, Smith Montgomery MD    morphine injection 1 mg, 1 mg, Intravenous, Q4H PRN **AND** naloxone (NARCAN) injection 0.4 mg, 0.4 mg, Intravenous, Q5 Min PRN, Smith Montgomery MD    ondansetron (ZOFRAN) injection 4 mg, 4 mg, Intravenous, Q6H PRN, Smith Montgomery MD    polyethylene glycol (MIRALAX) packet 17 g, 17 g, Oral, Daily PRN, Smith Montgomery MD, 17 g at 07/21/23 2016    sennosides-docusate (PERICOLACE) 8.6-50 MG per tablet 1 tablet, 1 tablet, Oral, Nightly PRN, Smith Montgomery MD    sodium chloride 0.9 % flush 10 mL, 10 mL, Intravenous, Q12H, Smith Montgomery MD, 10 mL at 07/23/23 0848    sodium chloride 0.9 % flush 10 mL, 10 mL, Intravenous, PRN, Smith Montgomery MD, 10 mL at 07/20/23 1007    sodium chloride 0.9 % infusion 40 mL, 40 mL, Intravenous, PRN, Smith Montgomery MD    triamterene-hydrochlorothiazide (MAXZIDE-25) 37.5-25 MG per tablet 1 tablet, 1 tablet, Oral, Every Other Day, Smith Montgomery MD, 1 tablet at 07/23/23 0847            No results found for: ALECIAGLU        Past Medical History:   Diagnosis Date    Anemia     Back pain     NUMBNESS/ TINGLINGPAIN DOWN  BILAT LOWER EXTREMITIES    Cataract     Chronic kidney disease     STAGE III - FOLLOWED ONLY BY PCP    DDD (degenerative disc disease), lumbar     Female infertility     Gout     Heart murmur     NO PROBLEMS    Hyperlipidemia     Hypertension     RA (rheumatoid arthritis)     NO PROBLEMS WITH FLEX/ EXTENSION       Assessment:  Active Hospital Problems    Diagnosis  POA    **Thoracic spinal stenosis [M48.04]  Yes    Thrombocytopenia [D69.6]  Unknown    Chronic kidney disease [N18.9]  Yes    Heart murmur [R01.1]  Yes    Back pain [M54.9]  Yes    Osteopenia [M85.80]  Yes    Degenerative scoliosis in adult patient [M41.50]  Yes    Idiopathic neuropathy [G60.9]  Yes    HTN (hypertension) [I10]  Yes    RA (rheumatoid arthritis) [M06.9]  Yes    GERD (gastroesophageal reflux disease) [K21.9]  Yes      Resolved Hospital Problems   No resolved problems to display.       Plan:  Status post T11-T12 laminectomy fusion postop on 7/19.  Continue supportive postop care.  Was able to successfully have a bowel movement earlier 7/22.   Is participating in physical therapy with improvement in symptoms.  RN reports that she plans to stay through Monday to continue to gain strength.  Plan is for self pay sub acute rehab on Monday.    7/22 BMP reviewed  unremarkable.   CBC does show anemia hemoglobin with continued trend up 8.4 (8 <--7.8).  White count remains normal.  7/22 Platelet count was down at 138,000 but has normalized.  Likely from consumption.  Continue to monitor.  Repeat CBC in sophie Younger, SANAM  7/23/2023  14:19 EDT

## 2023-07-24 VITALS
DIASTOLIC BLOOD PRESSURE: 57 MMHG | OXYGEN SATURATION: 97 % | TEMPERATURE: 98.1 F | HEIGHT: 64 IN | BODY MASS INDEX: 26.65 KG/M2 | RESPIRATION RATE: 16 BRPM | WEIGHT: 156.09 LBS | SYSTOLIC BLOOD PRESSURE: 101 MMHG | HEART RATE: 98 BPM

## 2023-07-24 LAB
ANION GAP SERPL CALCULATED.3IONS-SCNC: 8.3 MMOL/L (ref 5–15)
BUN SERPL-MCNC: 15 MG/DL (ref 8–23)
BUN/CREAT SERPL: 17.2 (ref 7–25)
CALCIUM SPEC-SCNC: 9.6 MG/DL (ref 8.6–10.5)
CHLORIDE SERPL-SCNC: 102 MMOL/L (ref 98–107)
CO2 SERPL-SCNC: 28.7 MMOL/L (ref 22–29)
CREAT SERPL-MCNC: 0.87 MG/DL (ref 0.57–1)
EGFRCR SERPLBLD CKD-EPI 2021: 72.2 ML/MIN/1.73
GLUCOSE SERPL-MCNC: 105 MG/DL (ref 65–99)
POTASSIUM SERPL-SCNC: 4.4 MMOL/L (ref 3.5–5.2)
SODIUM SERPL-SCNC: 139 MMOL/L (ref 136–145)

## 2023-07-24 PROCEDURE — 97530 THERAPEUTIC ACTIVITIES: CPT

## 2023-07-24 PROCEDURE — 80048 BASIC METABOLIC PNL TOTAL CA: CPT | Performed by: NURSE PRACTITIONER

## 2023-07-24 RX ADMIN — HYDROCODONE BITARTRATE AND ACETAMINOPHEN 1 TABLET: 5; 325 TABLET ORAL at 06:38

## 2023-07-24 RX ADMIN — CARVEDILOL 25 MG: 25 TABLET, FILM COATED ORAL at 08:32

## 2023-07-24 RX ADMIN — CALCIUM CARBONATE-VITAMIN D TAB 500 MG-200 UNIT 1 TABLET: 500-200 TAB at 08:31

## 2023-07-24 RX ADMIN — AMLODIPINE BESYLATE 10 MG: 10 TABLET ORAL at 08:31

## 2023-07-24 RX ADMIN — Medication 10 ML: at 08:41

## 2023-07-24 RX ADMIN — HYDROCODONE BITARTRATE AND ACETAMINOPHEN 1 TABLET: 5; 325 TABLET ORAL at 13:30

## 2023-07-24 RX ADMIN — ATORVASTATIN CALCIUM 10 MG: 20 TABLET, FILM COATED ORAL at 08:31

## 2023-07-24 NOTE — PROGRESS NOTES
Name: Leann Andrew ADMIT: 2023   : 1955  PCP: Nick Erwin MD    MRN: [id number] LOS: 5 days   AGE/SEX: 67 y.o. female  ROOM: [facility]     Subjective   Subjective   Sitting up in bed, she feels better today.  States that her pain is improving.    Objective   Objective   Vital Signs  Temp:  [97.9 °F (36.6 °C)-98.3 °F (36.8 °C)] 98.1 °F (36.7 °C)  Heart Rate:  [59-98] 98  Resp:  [16-18] 16  BP: ()/(55-71) 101/57  SpO2:  [97 %-99 %] 97 %  on   ;   Device (Oxygen Therapy): room air  Body mass index is 26.78 kg/m².  Physical Exam  Constitutional:       Appearance: Normal appearance.   Eyes:      Extraocular Movements: Extraocular movements intact.      Pupils: Pupils are equal, round, and reactive to light.   Pulmonary:      Effort: Pulmonary effort is normal.      Breath sounds: No stridor.   Skin:     Coloration: Skin is not jaundiced.   Neurological:      General: No focal deficit present.      Mental Status: She is alert and oriented to person, place, and time.   Psychiatric:         Mood and Affect: Mood normal.       Results Review     I reviewed the patient's new clinical results.  Results from last 7 days   Lab Units 23  0330 23  0341 23  0436 23  0435   WBC 10*3/mm3 6.06 6.91 6.85 5.90   HEMOGLOBIN g/dL 8.4* 8.0* 7.8* 8.7*   PLATELETS 10*3/mm3 150 138* 132* 153     Results from last 7 days   Lab Units 23  0428 23  0341 23  0436 23  0435   SODIUM mmol/L 139 139 145 141   POTASSIUM mmol/L 4.4 3.8 3.6 3.6   CHLORIDE mmol/L 102 104 111* 108*   CO2 mmol/L 28.7 27.0 26.0 22.0   BUN mg/dL 15 15 15 12   CREATININE mg/dL 0.87 0.76 0.82 0.93   GLUCOSE mg/dL 105* 125* 104* 141*   EGFR mL/min/1.73 72.2 84.9 77.5 66.7     Results from last 7 days   Lab Units 23  0435   ALBUMIN g/dL 3.9   BILIRUBIN mg/dL 0.2   ALK PHOS U/L 57   AST (SGOT) U/L 23   ALT (SGPT) U/L 13     Results from last 7 days   Lab Units 23  0428 23  0341  07/21/23  0436 07/20/23  0435   CALCIUM mg/dL 9.6 8.9 8.7 9.2   ALBUMIN g/dL  --   --   --  3.9       No results found for: HGBA1C, POCGLU    No radiology results for the last day  Scheduled Medications  amLODIPine, 10 mg, Oral, Daily  atorvastatin, 10 mg, Oral, Daily  calcium 500 mg vitamin D 5 mcg (200 UT), 1 tablet, Oral, BID  carvedilol, 25 mg, Oral, BID With Meals  losartan, 50 mg, Oral, Nightly  sodium chloride, 10 mL, Intravenous, Q12H  triamterene-hydrochlorothiazide, 1 tablet, Oral, Every Other Day    Infusions   Diet  Diet: Regular/House Diet; Texture: Regular Texture (IDDSI 7); Fluid Consistency: Thin (IDDSI 0)       Assessment/Plan     Active Hospital Problems    Diagnosis  POA    **Thoracic spinal stenosis [M48.04]  Yes    Thrombocytopenia [D69.6]  Unknown    Chronic kidney disease [N18.9]  Yes    Heart murmur [R01.1]  Yes    Back pain [M54.9]  Yes    Osteopenia [M85.80]  Yes    Degenerative scoliosis in adult patient [M41.50]  Yes    Idiopathic neuropathy [G60.9]  Yes    HTN (hypertension) [I10]  Yes    RA (rheumatoid arthritis) [M06.9]  Yes    GERD (gastroesophageal reflux disease) [K21.9]  Yes      Resolved Hospital Problems   No resolved problems to display.       67 y.o. female admitted with Thoracic spinal stenosis.      07/24/23  No objection to discharge from medical standpoint.    HTN  -Amlodipine 10 mg, Coreg 25 mg twice daily, losartan 50 mg, triamterene-HCTZ 37.5-25 mg every other day    HLD  -Atorvastatin 10 mg    Thoracic spinal stenosis  -s/p T11-12 lamionectomy w/ fusion  -per Ortho    SCDs for DVT prophylaxis.  Discussed with patient and family.      Uday Hilliard MD  [facility]  07/24/23  12:55 EDT

## 2023-07-24 NOTE — PROGRESS NOTES
Procedure(s):  T11-12 LAMINECTOMY FUSION     LOS: 5 days     Subjective :   No issues overnight.  Pain controlled     Objective :    Vital signs in last 24 hours:  Vitals:    07/23/23 2011 07/24/23 0553 07/24/23 0831 07/24/23 1005   BP: 97/56 113/71 125/68 101/57   BP Location: Left arm Left arm  Left arm   Patient Position: Lying Lying  Sitting   Pulse: 76 75 59 98   Resp: 16 16  16   Temp: 98.3 °F (36.8 °C) 98.1 °F (36.7 °C)  98.1 °F (36.7 °C)   TempSrc: Oral Oral  Oral   SpO2: 97% 99%  97%   Weight:       Height:           PHYSICAL EXAM:  Patient is calm, in no acute distress, awake and oriented x 3.  .  Patient is neurovascularly intact      LABS:  Results from last 7 days   Lab Units 07/23/23  0330   WBC 10*3/mm3 6.06   HEMOGLOBIN g/dL 8.4*   HEMATOCRIT % 24.3*   PLATELETS 10*3/mm3 150       Results from last 7 days   Lab Units 07/24/23  0428   SODIUM mmol/L 139   POTASSIUM mmol/L 4.4   CHLORIDE mmol/L 102   CO2 mmol/L 28.7   BUN mg/dL 15   CREATININE mg/dL 0.87   GLUCOSE mg/dL 105*   CALCIUM mg/dL 9.6               ASSESSMENT:  Status post Procedure(s):  T11-12 LAMINECTOMY FUSION      Spinal Surgery Levels Completed:1 Level     Plan:    PT/OT  SCDs for DVT ppx  Pain control - tolerating PO meds  Bowel regimen  - + BM   Appreciate recs per med hospitalist  Acute post op blood loss anemia - hgb 8.7(8.4) will continue to monitor.   Dispo - d/c [facility] when bed available    Smith Montgomery MD    Date: 7/24/2023  Time: 13:14 EDT

## 2023-07-24 NOTE — CASE MANAGEMENT/SOCIAL WORK
Continued Stay Note  [facility]     Patient Name: Leann Andrew  MRN: [id number]  Today's Date: 7/24/2023    Admit Date: 7/19/2023    Plan: [facility] -- Accepted.   Discharge Plan       Row Name 07/24/23 1331       Plan    Plan [facility] -- Accepted.    Patient/Family in Agreement with Plan yes    Plan Comments Spoke with Bhanu earlier this AM who said they have obtained pre-cert and have a SNF bed for the patient today. Jenny would like to clarify whether the patient will be discharged on the medication Enbrel. If not, then she asked for the MD to clarify that in the discharge summary. [facility] paged Dr. Montgomery today at 1108 and 1331 to discuss with him. Later spoke with Dr. Montgomery - Discharge Summary noted - discussed with Bhanu who's agreeable. Spoke with the patient, Bhanu and BRIGID Lucas who are all agreeable with the d/c plan. Called and left a message for the patient's /Neil. Patient said her family will transport her at d/c. No other needs identified. Packet is on the chart.    Final Discharge Disposition Code 03 - skilled nursing facility (SNF)    Final Note [facility].                   Discharge Codes    No documentation.                 Expected Discharge Date and Time       Expected Discharge Date Expected Discharge Time    Jul 24, 2023               Felicia OLIVER RN

## 2023-07-24 NOTE — PLAN OF CARE
Goal Outcome Evaluation:  Plan of Care Reviewed With: patient   All goals met, pt safe for d/c     Progress: improving

## 2023-07-24 NOTE — THERAPY TREATMENT NOTE
Patient Name: Leann Andrew  : 1955    MRN: [id number]                              Today's Date: 2023       Admit Date: 2023    Visit Dx:     ICD-10-CM ICD-9-CM   1. Thoracic spinal stenosis  M48.04 724.01     Patient Active Problem List   Diagnosis    RA (rheumatoid arthritis)    GERD (gastroesophageal reflux disease)    HTN (hypertension)    Hypercholesteremia    DDD (degenerative disc disease), lumbar    Screening for colon cancer    Thoracic spinal stenosis    Chronic kidney disease    Heart murmur    Back pain    Osteopenia    Anemia due to chronic illness    Bilateral cataracts    Long-term use of high-risk medication    Lumbar radiculopathy, acute    Menopause    Benign hypertension with chronic kidney disease, stage III    Degenerative scoliosis in adult patient    Idiopathic neuropathy    Thrombocytopenia     Past Medical History:   Diagnosis Date    Anemia     Back pain     NUMBNESS/ TINGLINGPAIN DOWN BILAT LOWER EXTREMITIES    Cataract     Chronic kidney disease     STAGE III - FOLLOWED ONLY BY PCP    DDD (degenerative disc disease), lumbar     Female infertility     Gout     Heart murmur     NO PROBLEMS    Hyperlipidemia     Hypertension     RA (rheumatoid arthritis)     NO PROBLEMS WITH FLEX/ EXTENSION     Past Surgical History:   Procedure Laterality Date    COLONOSCOPY N/A 2021    Procedure: COLONOSCOPY INTO CECUM WITH COLD BIOPSY POLYPECTOMIES;  Surgeon: Chuy Bronson Jr., MD;  Location: [facility] ENDOSCOPY;  Service: General;  Laterality: N/A;  PRE- SCREENING  POST- POLYPS    EPIDURAL BLOCK      FOOT SURGERY Right     RHEUMATOID BONE SPURS REMOVED, GRAFT FROM KNEE    HYSTERECTOMY        General Information       Row Name 23 1130          Physical Therapy Time and Intention    Document Type therapy note (daily note)  -MS     Mode of Treatment physical therapy;individual therapy  -MS       Row Name 23 6606          General Information    Patient Profile Reviewed  yes  -MS     Existing Precautions/Restrictions fall;spinal   Exit alarm  -MS     Barriers to Rehab none identified  -MS       Row Name 07/24/23 1130          Cognition    Orientation Status (Cognition) oriented x 3  -MS       Row Name 07/24/23 1130          Safety Issues, Functional Mobility    Comment, Safety Issues/Impairments (Mobility) Gait belt used for safety.  -MS               User Key  (r) = Recorded By, (t) = Taken By, (c) = Cosigned By      Initials Name Provider Type    Dao Patrick, PT Physical Therapist                   Mobility       Row Name 07/24/23 1130          Bed Mobility    Supine-Sit [place] (Bed Mobility) standby assist  -MS     Comment, (Bed Mobility) Via logroll  -MS       Row Name 07/24/23 1130          Sit-Stand Transfer    Sit-Stand [place] (Transfers) contact guard  -MS     Assistive Device (Sit-Stand Transfers) walker, front-wheeled  -MS       Row Name 07/24/23 1130          Gait/Stairs (Locomotion)    [place] Level (Gait) contact guard  -MS     Assistive Device (Gait) walker, front-wheeled  -MS     Distance in Feet (Gait) 350 feet  -MS     Deviations/Abnormal Patterns (Gait) aung decreased  -MS     Bilateral Gait Deviations forward flexed posture  -MS     Comment, (Gait/Stairs) Verbal/tactile cues given for posture correction.  -MS               User Key  (r) = Recorded By, (t) = Taken By, (c) = Cosigned By      Initials Name Provider Type    Dao Patrick PT Physical Therapist                   Obj/Interventions       Row Name 07/24/23 1131          Motor Skills    Therapeutic Exercise --  BLE ther. ex. program x 10 reps completed (Ankle pumps, Hip Flexion, LAQ's)  -MS               User Key  (r) = Recorded By, (t) = Taken By, (c) = Cosigned By      Initials Name Provider Type    Dao Patrick, PT Physical Therapist                   Goals/Plan    No documentation.                  Clinical Impression       Row Name 07/24/23 1131           Pain    Pretreatment Pain Rating 3/10  -MS     Posttreatment Pain Rating 3/10  -MS     Pain Location - back  -MS       Row Name 07/24/23 1131          Positioning and Restraints    Pre-Treatment Position in bed  -MS     Post Treatment Position chair  -MS     In Chair notified nsg;reclined;sitting;call light within reach;encouraged to call for assist;exit alarm on  -MS               User Key  (r) = Recorded By, (t) = Taken By, (c) = Cosigned By      Initials Name Provider Type    Dao Patrick, PT Physical Therapist                   Outcome Measures       Row Name 07/24/23 1132 07/24/23 0833       How much help from another person do you currently need...    Turning from your back to your side while in flat bed without using bedrails? 3  -MS 3  -EK    Moving from lying on back to sitting on the side of a flat bed without bedrails? 3  -MS 3  -EK    Moving to and from a bed to a chair (including a wheelchair)? 3  -MS 3  -EK    Standing up from a chair using your arms (e.g., wheelchair, bedside chair)? 3  -MS 3  -EK    Climbing 3-5 steps with a railing? 3  -MS 2  -EK    To walk in hospital room? 3  -MS 3  -EK    AM-PAC 6 Clicks Score (PT) 18  -MS 17  -EK    Highest level of mobility 6 --> Walked 10 steps or more  -MS 5 --> Static standing  -EK      Row Name 07/24/23 1132          Functional Assessment    Outcome Measure Options AM-PAC 6 Clicks Basic Mobility (PT)  -MS               User Key  (r) = Recorded By, (t) = Taken By, (c) = Cosigned By      Initials Name Provider Type    Dao Patrick, PT Physical Therapist    Shayy Felix, BRIGID Registered Nurse                                 Physical Therapy Education       Title: PT OT SLP Therapies (In Progress)       Topic: Physical Therapy (Done)       Point: Mobility training (Done)       Learning Progress Summary             Patient Acceptance, E,D, VU,NR by MS at 7/24/2023 1132    Eager, E,D, VU,DU by JR at 7/22/2023 1006    Acceptance, E,D, VU,NR by MS at  7/21/2023 1055    Acceptance, E,D, VU,NR by MS at 7/20/2023 1127                         Point: Home exercise program (Done)       Learning Progress Summary             Patient Acceptance, E,D, VU,NR by MS at 7/24/2023 1132    Eager, E,D, VU,DU by JR at 7/22/2023 1006    Acceptance, E,D, VU,NR by MS at 7/21/2023 1055                         Point: Body mechanics (Done)       Learning Progress Summary             Patient Acceptance, E,D, VU,NR by MS at 7/24/2023 1132    Eager, E,D, VU,DU by JR at 7/22/2023 1006    Acceptance, E,D, VU,NR by MS at 7/21/2023 1055    Acceptance, E,D, VU,NR by MS at 7/20/2023 1127                         Point: Precautions (Done)       Learning Progress Summary             Patient Acceptance, E,D, VU,NR by MS at 7/24/2023 1132    Eager, E,D, VU,DU by JR at 7/22/2023 1006    Acceptance, E,D, VU,NR by MS at 7/21/2023 1055    Acceptance, E,D, VU,NR by MS at 7/20/2023 1127                                         User Key       Initials Effective Dates Name Provider Type Discipline    MS 06/16/21 -  Dao Schuler, PT Physical Therapist PT     06/16/21 -  Aashish Abreu PT Physical Therapist PT                  PT Recommendation and Plan  Planned Therapy Interventions (PT): balance training, bed mobility training, gait training, home exercise program, postural re-education, patient/family education, transfer training, strengthening  Plan of Care Reviewed With: patient  Outcome Evaluation: Upon entering room, pt. supine in bed, awake/alert, and agreeable to work with P.T. this date. Pt. able to ambulate 350 feet, CGA x 1, with use of Rwx this AM. Pt. requires CGA x 1 for sit <-> stand transfers and SBA x 1 for bed mobility (via logroll).  BLE ther. ex. program x 10 rep completed for general strengthening. Verbal/tactile cues given during ambulation for posture correction. Will continue to progress functional mobility as tolerated.     Time Calculation:         PT Charges       Row Name  07/24/23 1134             Time Calculation    Start Time 0850  -MS      Stop Time 0905  -MS      Time Calculation (min) 15 min  -MS      PT Received On 07/24/23  -MS      PT - Next Appointment 07/25/23  -MS         Time Calculation- PT    Total Timed Code Minutes- PT 14 minute(s)  -MS                User Key  (r) = Recorded By, (t) = Taken By, (c) = Cosigned By      Initials Name Provider Type    Dao Patrick, PT Physical Therapist                  Therapy Charges for Today       Code Description Service Date Service Provider Modifiers Qty    [id number] HC PT THERAPEUTIC ACT EA 15 MIN 7/24/2023 Dao Schuler, PT GP 1            PT G-Codes  Outcome Measure Options: AM-PAC 6 Clicks Basic Mobility (PT)  AM-PAC 6 Clicks Score (PT): 18  AM-PAC 6 Clicks Score (OT): 21  PT Discharge Summary  Anticipated Discharge Disposition (PT): skilled nursing facility    Dao Schuler, PT  7/24/2023

## 2023-07-24 NOTE — CASE MANAGEMENT/SOCIAL WORK
Continued Stay Note  [facility]     Patient Name: Leann Andrew  MRN: [id number]  Today's Date: 7/24/2023    Admit Date: 7/19/2023    Plan: [place] [facility] -- Accepted, needs pre-cert.   Discharge Plan       Row Name 07/24/23 0932       Plan    Plan [place] [facility] -- Accepted, needs pre-cert.    Patient/Family in Agreement with Plan yes    Plan Comments Physical Therapy eval noted. Discussed with the patient as well as HH. Patient said she still wants to d/c to SNF. Case was sent to the [facility] Physician Advisor. Await their determination.                   Discharge Codes    No documentation.                 Expected Discharge Date and Time       Expected Discharge Date Expected Discharge Time    Jul 22, 2023               Felicia OLIVER RN

## 2023-07-24 NOTE — PLAN OF CARE
Goal Outcome Evaluation:   POD 5 lami/fusion. VSS, soft BP at times. Pain controlled with PO medications. Ambulating with assist of 1 and walker/gait belt. Voiding adequately. Neuro checks WDL. Dressing intact. Plan to DC to SNF, pending. CCP to follow up. WCTM.

## 2023-07-24 NOTE — PLAN OF CARE
Goal Outcome Evaluation:  Plan of Care Reviewed With: patient           Outcome Evaluation: Upon entering room, pt. supine in bed, awake/alert, and agreeable to work with P.T. this date. Pt. able to ambulate 350 feet, CGA x 1, with use of Rwx this AM. Pt. requires CGA x 1 for sit <-> stand transfers and SBA x 1 for bed mobility (via logroll).  BLE ther. ex. program x 10 rep completed for general strengthening. Verbal/tactile cues given during ambulation for posture correction. Will continue to progress functional mobility as tolerated.      Anticipated Discharge Disposition (PT): skilled nursing facility    Patient was not wearing a face mask during this therapy encounter. Therapist used appropriate personal protective equipment including eye protection, mask, and gloves.  Mask used was standard procedure mask. Appropriate PPE was worn during the entire therapy session. Hand hygiene was completed before and after therapy session. Patient is not in enhanced droplet precautions.

## 2023-08-16 ENCOUNTER — APPOINTMENT (OUTPATIENT)
Dept: BONE DENSITY | Facility: HOSPITAL | Age: 68
End: 2023-08-16
Payer: MEDICARE

## 2023-08-16 ENCOUNTER — HOSPITAL ENCOUNTER (OUTPATIENT)
Dept: MAMMOGRAPHY | Facility: HOSPITAL | Age: 68
Discharge: HOME | End: 2023-08-16
Admitting: OBSTETRICS & GYNECOLOGY
Payer: MEDICARE

## 2023-08-16 DIAGNOSIS — Z12.31 ENCOUNTER FOR SCREENING MAMMOGRAM FOR MALIGNANT NEOPLASM OF BREAST: ICD-10-CM

## 2023-08-16 PROCEDURE — 77063 BREAST TOMOSYNTHESIS BI: CPT

## 2023-08-16 PROCEDURE — 77067 SCR MAMMO BI INCL CAD: CPT

## 2023-09-12 ENCOUNTER — HOSPITAL ENCOUNTER (OUTPATIENT)
Dept: PHYSICAL THERAPY | Facility: HOSPITAL | Age: 68
Setting detail: THERAPIES SERIES
Discharge: HOME | End: 2023-09-12
Payer: MEDICARE

## 2023-09-12 DIAGNOSIS — Z98.1 S/P LAMINECTOMY WITH SPINAL FUSION: ICD-10-CM

## 2023-09-12 DIAGNOSIS — M54.6 THORACIC SPINE PAIN: Primary | ICD-10-CM

## 2023-09-12 PROCEDURE — 97161 PT EVAL LOW COMPLEX 20 MIN: CPT

## 2023-09-12 PROCEDURE — 97110 THERAPEUTIC EXERCISES: CPT

## 2023-09-12 NOTE — THERAPY EVALUATION
Outpatient Physical Therapy Ortho Initial Evaluation   Justine Trejo     Patient Name: Leann Andrew  : 1955  MRN: [id number]  Today's Date: 2023      Visit Date: 2023    Patient Active Problem List   Diagnosis    RA (rheumatoid arthritis)    GERD (gastroesophageal reflux disease)    HTN (hypertension)    Hypercholesteremia    DDD (degenerative disc disease), lumbar    Screening for colon cancer    Thoracic spinal stenosis    Chronic kidney disease    Heart murmur    Back pain    Osteopenia    Anemia due to chronic illness    Bilateral cataracts    Long-term use of high-risk medication    Lumbar radiculopathy, acute    Menopause    Benign hypertension with chronic kidney disease, stage III    Degenerative scoliosis in adult patient    Idiopathic neuropathy    Thrombocytopenia        Past Medical History:   Diagnosis Date    Anemia     Back pain     NUMBNESS/ TINGLINGPAIN DOWN BILAT LOWER EXTREMITIES    Cataract     Chronic kidney disease     STAGE III - FOLLOWED ONLY BY PCP    DDD (degenerative disc disease), lumbar     Female infertility     Gout     Heart murmur     NO PROBLEMS    Hyperlipidemia     Hypertension     RA (rheumatoid arthritis)     NO PROBLEMS WITH FLEX/ EXTENSION        Past Surgical History:   Procedure Laterality Date    COLONOSCOPY N/A 2021    Procedure: COLONOSCOPY INTO CECUM WITH COLD BIOPSY POLYPECTOMIES;  Surgeon: Chuy Bronson Jr., MD;  [place] [facility] [place];  Service: General;  Laterality: N/A;  PRE- SCREENING  POST- POLYPS    EPIDURAL BLOCK      FOOT SURGERY Right     RHEUMATOID BONE SPURS REMOVED, GRAFT FROM KNEE    HYSTERECTOMY      THORACIC DECOMPRESSION POSTERIOR FUSION WITH INSTRUMENTATION N/A 2023    Procedure: T11-12 LAMINECTOMY FUSION;  Surgeon: Smith Montgomery MD;  [place] [facility] [place];  Service: Orthopedic Spine;  Laterality: N/A;       Visit Dx:     ICD-10-CM ICD-9-CM   1. Thoracic spine pain  M54.6 724.1   2. S/P laminectomy with spinal  "fusion  Z98.1 V45.4          Patient History       Row Name 09/12/23 1000             History    Chief Complaint Difficulty Walking;Muscle tenderness;Muscle weakness;Pain;Tightness;Tinglings;Joint stiffness  -LN      Type of Pain Back pain;Lower Extremity / Leg  c/o nerve pain B legs at night  -LN      Date Current Problem(s) Began 07/19/23  Pain began in thoracic spine in March 2023  -LN      Brief Description of Current Complaint Pt s/p T11-12 laminectomy/fusion on 7/19/23; hx of lumbar issues and then began having pain in lower thoracic spine in March 2023 and had a EMG which showed some neuropathy and then MRI showed bad arthritis causing compression in her spine at T11-12. per pt.  Pt went to [facility] 7/25-8/5/23 after surgery &  then received HH PT with last visit being last Wednesday, 9/6/23. Pt now referred to outpt PT. Pt reports that she is limited to 5-10# lifting and no bending or twisting.  -LN      Previous treatment for THIS PROBLEM Surgery;Medication;Rehabilitation;Injections  injections/blocks in lower back  -LN      Surgery Date: 07/19/23  -LN      Patient/Caregiver Goals Relieve pain;Improve mobility;Improve strength;Know what to do to help the symptoms;Return to prior level of function  -LN      Patient/Caregiver Goals Comment \"walking on my own and driving\"  -LN      Occupation/sports/leisure activities Pt is retired; she likes to read and do puzzles.  -LN      Patient seeing anyone else for problem(s)? spinal surgeon  -LN      How has patient tried to help current problem? CP; rest; ambulating with FWW, and exercises from STR & HH PT.  -LN      What clinical tests have you had for this problem? X-ray;MRI  -LN      Results of Clinical Tests see imaging  -LN      Related/Recent Hospitalizations Yes  -LN      Date of Hospitalization 07/19/23  -LN      Surgery/Hospitalization s/p T11-T12 laminectomy/fusion  -LN      History of Previous Related Injuries none reported- but has hx of lumbar issues  -LN   "       Pain     Pain Location Back;Leg  B leg pain due to neuropathy at night  -LN      Pain at Present 0  No present LBP reported  -LN      Pain at Best 0  -LN      Pain at Worst --  -LN      Pain Frequency Constant/continuous  -LN      Pain Description Aching;Tiring;Tingling  nerve pain in B legs- taking generic Cymbalta and that seems to be helping  -LN      What Performance Factors Make the Current Problem(s) WORSE? up being active/lt household chores; standing; tingling is worse at night in legs; going up the steps (fear of left leg going out).  -LN      What Performance Factors Make the Current Problem(s) BETTER? sitting but after awhile has to stand and straighten her back; rest;  -LN      Tolerance Time- Standing limited to 10 minutes  -LN      Tolerance Time- Sitting okay but limited after awhile  -LN      Tolerance Time- Walking beginning to use a cane 10-20% of time in home; mostly uses rolling walker; pt reports limited to 1/4 mile max.  -LN      Tolerance Time- Lying limited due to neuropathy pain in legs  -LN      Is your sleep disturbed? Yes  due to neuropathy in legs  -LN      What position do you sleep in? Right sidelying;Left sidelying  -LN      Difficulties at work? retired  -LN      Difficulties with ADL's? no help needed per pt  -LN      Difficulties with recreational activities? pt does water aerobics and likes to cook, but hasn't been able to do in awhile; and used to do a lot of walking.  -LN         Fall Risk Assessment    Any falls in the past year: Yes  -LN      Number of falls reported in the last 12 months 5-6  -LN      Factors that contributed to the fall: Other (comment)  -LN      Other factors that contributed to the fall: Left leg giving out on her  -LN      Does patient have a fear of falling Yes (comment)  with going up stairs  -LN         Services    Prior Rehab/Home Health Experiences Yes  -LN      When was the prior experience with Rehab/Home Health PT s/p recent back surgery in  [facility] and  PT  -LN      Are you currently receiving Home Health services No  -LN      Do you plan to receive Home Health services in the near future No  -LN         Daily Activities    Primary Language English  -LN      Are you able to read Yes  -LN      Are you able to write Yes  -LN      How does patient learn best? Reading;Pictures/Video  -LN      Teaching needs identified Home Exercise Program;Other (comment);Falls Prevention  Risks and benefits of treatment explained to pt and   -LN      Patient is concerned about/has problems with Bed Mobility;Climbing Stairs;Flexibility;Grasping objects lifting;Performing home management (household chores, shopping, care of dependents);Performing job responsibilities/community activities (work, school,;Performing sports, recreation, and play activities;Standing;Transfers (getting out of a chair, bed);Walking  -LN      Does patient have problems with the following? None  -LN      Barriers to learning None  -LN      Pt Participated in POC and Goals Yes  -LN         Safety    Are you being hurt, hit, or frightened by anyone at home or in your life? No  -LN      Are you being neglected by a caregiver No  -LN      Have you had any of the following issues with N/A  -LN                User Key  (r) = Recorded By, (t) = Taken By, (c) = Cosigned By      Initials Name Provider Type    Angie Borrego, PT Physical Therapist                     PT Ortho       Row Name 09/12/23 1100       Subjective    Subjective Comments Pt  reports not really having any pain but does report aching and has nerve pain in B legs mostly at night. Pt has continued doing the exercises that  PT gave her and used CP on her back after surgery.  -LN       Precautions and Contraindications    Precautions/Limitations spinal precautions  -LN    Precautions no heavy lifting (5-10# max) and no bending or twisting per pt  -LN       Subjective Pain    Able to rate subjective pain? yes  -LN     Pre-Treatment Pain Level 0  -LN    Post-Treatment Pain Level 0  -LN       Posture/Observations    Lumbar lordosis Mild;Decreased;Standing posture  -LN    Iliac crests Left:;Mild;Elevated;Standing posture  -LN    Observations Incision healing  -LN    Posture/Observations Comments well healed incision lower thoracic spine.  -LN       Lumbar/SI Special Tests    SLR (Neural Tension) Bilateral:;Negative  -LN    ERIN (hip vs. SI Dysfunction) Bilateral:;Negative  -LN    Lumbar/SI Special Tests Comments limited testing able to be done due to recent thoracic spine surgery  -LN       Lumbosacral Palpation    Lumbosacral Palpation Comments Pt with minimal tenderness T11, T12 levels and incisional area.  -LN       Leg Length Test    True Equal  -LN    Apparent Unequal;Left Higher Leg  sx of left pelvic obliquity  -LN       General ROM    GENERAL ROM COMMENTS Trunk ROM not assessed today; B LE ROM grossly WFL.  -LN       MMT Right Lower Ext    Rt Hip Flexion MMT, Gross Movement (4/5) good  -LN    Rt Hip Extension MMT, Gross Movement (4+/5) good plus  -LN    Rt Hip ABduction MMT, Gross Movement (4+/5) good plus  -LN    Rt Hip ADduction MMT, Gross Movement (4+/5) good plus  -LN    Rt Knee Extension MMT, Gross Movement (4/5) good  -LN    Rt Knee Flexion MMT, Gross Movement (4/5) good  -LN    Rt Ankle Plantarflexion MMT, Gross Movement (4+/5) good plus  -LN    Rt Ankle Dorsiflexion MMT, Gross Movement (4+/5) good plus  -LN       MMT Left Lower Ext    Lt Hip Flexion MMT, Gross Movement (4-/5) good minus  -LN    Lt Hip Extension MMT, Gross Movement (4+/5) good plus  -LN    Lt Hip ABduction MMT, Gross Movement (4-/5) good minus  -LN    Lt Hip ADduction MMT, Gross Movement (4/5) good  -LN    Lt Knee Extension MMT, Gross Movement (4/5) good  -LN    Lt Knee Flexion MMT, Gross Movement (4/5) good  -LN    Lt Ankle Plantarflexion MMT, Gross Movement (4+/5) good plus  -LN    Lt Ankle Dorsiflexion MMT, Gross Movement (4+/5) good plus  -LN        Sensation    Light Touch Partial deficits in the RLE;Partial deficits in the LLE  primarily B thighs  -LN    Additional Comments Pt c/o B leg nerve pain (tingling) terir at night.  -LN       Lower Extremity Flexibility    Hamstrings Bilateral:;WNL  -LN    ITB Bilateral:;Mildly limited  -LN    Hip External Rotators Bilateral:;Mildly limited  -LN    Gastrocnemius Bilateral:;WNL  -LN       Transfers    Sit-Stand [place] (Transfers) independent  -LN    Stand-Sit [place] (Transfers) independent  -LN    Transfers, Sit-Stand-Sit, Assist Device rolling walker  -LN       Gait/Stairs (Locomotion)    [place] Level (Gait) independent  -LN    Assistive Device (Gait) walker, front-wheeled  -LN    Deviations/Abnormal Patterns (Gait) antalgic  -LN    Bilateral Gait Deviations forward flexed posture  -LN    Comment, (Gait/Stairs) Pt ambulates independently with FWW with good gait speed but minimal antalgic gait noted. and mild FF posture noted.  -LN              User Key  (r) = Recorded By, (t) = Taken By, (c) = Cosigned By      Initials Name Provider Type    LN Angie Carter, PT Physical Therapist                                Therapy Education  Education Details: Pt to work on HEP 1- 2 x day as tolerated and continue her LE exercises (seated and standing) from HH PT. Pt to use MH/CP PRN. Pt to continue using SPC at home and increase time using it as tolerated. Also recommended that she walk on driveway at least 1 x day with FWW as tolerated and increase distance as tolerated.  Given: HEP, Symptoms/condition management, Pain management, Posture/body mechanics, Mobility training  Program: New, Reinforced  How Provided: Verbal, Demonstration, Written  Provided to: Patient, Caregiver  Level of Understanding: Teach back education performed, Verbalized, Demonstrated      PT OP Goals       Row Name 09/12/23 1100          PT Short Term Goals    STG Date to Achieve 09/26/23  -LN     STG 1 Pt independent with  initial HEP issued by therapist.  -LN     STG 2 Pt able to perform 10-15 reps core stabilization (neutral) exercises without any c/o back pain.  -LN     STG 3 Pt able to maintain good pelvic alignment between PT sessions with use of MET and core stabilization program.  -LN     STG 4 B LE strength improved by 1/2 muscle grade.  -LN     STG 5 Pt able to ambulate all home and short community distances with SPC  with good gait speed noted and only nominal antalgic gait. (including up and down her driveway).  -LN        Long Term Goals    LTG Date to Achieve 10/10/23  -LN     LTG 1 Pt independent with more advanced HEP issued by therapist.  -LN     LTG 2 Trunk ROM WFL and painfree all planes.  -LN     LTG 3 Core strength improved to 4-4+/5 when tested.  -LN     LTG 4 B LE strength improved to 5/5.  -LN     LTG 5 Pt able to ambulate home & short community distances without any AD and only nominal antalgic gait noted. (including up and down her driveway)  -LN     LTG 6 Pt able to go up and down her front porch steps with 1 railing independently and no c/o left leg wanting to give out.  -LN     LTG 7 Pt to have improved standing tolerance to 30 minutes, so she can stand and cook a meal in her kitchen without any c/o increased back pain.  -LN        Time Calculation    PT Goal Re-Cert Due Date 10/10/23  -LN               User Key  (r) = Recorded By, (t) = Taken By, (c) = Cosigned By      Initials Name Provider Type    LN Angie Carter, PT Physical Therapist                     PT Assessment/Plan       Row Name 09/12/23 1100          PT Assessment    Functional Limitations Impaired gait;Impaired locomotion;Limitation in home management;Limitations in community activities;Limitations in functional capacity and performance;Performance in leisure activities  -LN     Impairments Balance;Gait;Impaired flexibility;Locomotion;Muscle strength;Pain;Posture;Range of motion;Sensation  -LN     Assessment Comments Pt presents  with hx of thoracic pain since March 2023 and now 7 weeks s/p T11, T12 laminectomy/fusion. Pt with no c/o pain ; just achiness and does c/o B leg nerve pain/neuropathy primarily at night (N/T). Pt with limited trunk ROM; decreased strength B legs; L>R ( terri L hip); decreased core strength; sx of L pelvic obliquity & functional mobility and gait deficit. Pt with decreased tolerance to home/leisure/community activities due to above. Pt also with hx of lumbar spine issues and left leg giving out on her & causing her to fall, which affects her ability and confidence in going up stairs.  -LN     Please refer to paper survey for additional self-reported information Yes  -LN     Rehab Potential Good  -LN     Patient/caregiver participated in establishment of treatment plan and goals Yes  -LN     Patient would benefit from skilled therapy intervention Yes  -LN        PT Plan    PT Frequency 2x/week  -LN     Predicted Duration of Therapy Intervention (PT) 6-8 weeks  -LN     Planned CPT's? PT EVAL LOW COMPLEXITY: 34517;PT RE-EVAL: 10319;PT THER PROC EA 15 MIN: 97253;PT MANUAL THERAPY EA 15 MIN: 05428;PT GAIT TRAINING EA 15 MIN: 55508;PT HOT OR COLD PACK TREAT MCARE;PT ELECTRICAL STIM UNATTEND:   -LN     Physical Therapy Interventions (Optional Details) home exercise program;lumbar stabilization;manual therapy techniques;modalities;patient/family education;postural re-education;ROM (Range of Motion);strengthening;stretching;taping  -LN     PT Plan Comments See pt 2 x week for therapeutic exercises/core stabilization program with HEP; modalities PRN (MH/CP/IFC); MET PRN; pt and posture education; gait training; kinesiotape PRN.  -LN               User Key  (r) = Recorded By, (t) = Taken By, (c) = Cosigned By      Initials Name Provider Type    Angie Borrego, PT Physical Therapist                     Modalities       Row Name 09/12/23 1100             Moist Heat    Patient denies application of MH Yes  to use  MH/CP PRN at home  -LN                User Key  (r) = Recorded By, (t) = Taken By, (c) = Cosigned By      Initials Name Provider Type    LN Angie Carter, PT Physical Therapist                   OP Exercises       Row Name 09/12/23 1100             Precautions    Existing Precautions/Restrictions spinal  no BLT's  -LN         Subjective    Subjective Comments Pt  reports not really having any pain but does report aching and has nerve pain in B legs mostly at night. Pt has continued doing the exercises that  PT gave her and used CP on her back after surgery.  -LN         Subjective Pain    Able to rate subjective pain? yes  -LN      Pre-Treatment Pain Level 0  -LN      Post-Treatment Pain Level 0  -LN         Total Minutes    28292 - PT Therapeutic Exercise Minutes 20  -LN         Exercise 1    Exercise Name 1 supine GS  -LN      Cueing 1 Verbal;Tactile;Demo  -LN      Reps 1 10  -LN      Time 1 5 sec  -LN         Exercise 2    Exercise Name 2 PPT  -LN      Cueing 2 Verbal;Tactile;Demo  -LN      Reps 2 10  -LN      Time 2 5 sec  -LN         Exercise 3    Exercise Name 3 PPT with ball squeeze  -LN      Cueing 3 Verbal;Tactile;Demo  -LN      Reps 3 10  -LN      Time 3 5 sec  -LN         Exercise 4    Exercise Name 4 PPT with hooklying clam shells vs TB  -LN      Cueing 4 Verbal;Tactile;Demo  -LN      Reps 4 10  -LN      Time 4 5 sec  -LN      Additional Comments blue TB  -LN         Exercise 5    Exercise Name 5 Forward step ups  -LN      Cueing 5 Verbal;Tactile;Demo  -LN      Reps 5 10 each  -LN      Additional Comments 4 inch step; with use of L sided hand rail only  -LN         Exercise 6    Exercise Name 6 sit to stand with hands on table  -LN      Cueing 6 Verbal;Tactile;Demo  -LN      Reps 6 10  -LN      Additional Comments from treatment table  -LN                User Key  (r) = Recorded By, (t) = Taken By, (c) = Cosigned By      Initials Name Provider Type    Angie Borrego, PT Physical  Therapist                  Manual Rx (last 36 hours)       Manual Treatments       Row Name 09/12/23 1100             Manual Rx 1    Manual Rx 1 Location Pelvis  -LN      Manual Rx 1 Type MET: shotgun/L anterior innominate/L pelvic outflare  -LN      Manual Rx 1 Duration Improved pelvic alignment noted after MET.  -LN                User Key  (r) = Recorded By, (t) = Taken By, (c) = Cosigned By      Initials Name Provider Type    LN Angie Carter, PT Physical Therapist                                Outcome Measure Options: Other Outcome Measure  Other Outcome Measure Tool Used  Other Outcome Measure Tool Comments: Back index= score of 20 today      Time Calculation:     Start Time: 1050  Stop Time: 1140  Time Calculation (min): 50 min  Timed Charges  95733 - PT Therapeutic Exercise Minutes: 20  Total Minutes  Timed Charges Total Minutes: 20   Total Minutes: 20     Therapy Charges for Today       Code Description Service Date Service Provider Modifiers Qty    04754128781 HC PT THER PROC EA 15 MIN 9/12/2023 Angie Carter, PT GP 1    01526803753 HC PT EVAL LOW COMPLEXITY 2 9/12/2023 Angie Carter, PT GP 1            PT G-Codes  Outcome Measure Options: Other Outcome Measure         Angie Carter, PT  9/12/2023

## 2023-09-14 ENCOUNTER — HOSPITAL ENCOUNTER (OUTPATIENT)
Dept: PHYSICAL THERAPY | Facility: HOSPITAL | Age: 68
Setting detail: THERAPIES SERIES
Discharge: HOME | End: 2023-09-14
Payer: MEDICARE

## 2023-09-14 DIAGNOSIS — Z98.1 S/P LAMINECTOMY WITH SPINAL FUSION: ICD-10-CM

## 2023-09-14 DIAGNOSIS — M54.6 THORACIC SPINE PAIN: Primary | ICD-10-CM

## 2023-09-14 PROCEDURE — 97110 THERAPEUTIC EXERCISES: CPT

## 2023-09-14 NOTE — THERAPY TREATMENT NOTE
Outpatient Physical Therapy Ortho Treatment Note   Justine Trejo     Patient Name: Leann Andrew  : 1955  MRN: [id number]  Today's Date: 2023      Visit Date: 2023    Visit Dx:    ICD-10-CM ICD-9-CM   1. Thoracic spine pain  M54.6 724.1   2. S/P laminectomy with spinal fusion  Z98.1 V45.4       Patient Active Problem List   Diagnosis    RA (rheumatoid arthritis)    GERD (gastroesophageal reflux disease)    HTN (hypertension)    Hypercholesteremia    DDD (degenerative disc disease), lumbar    Screening for colon cancer    Thoracic spinal stenosis    Chronic kidney disease    Heart murmur    Back pain    Osteopenia    Anemia due to chronic illness    Bilateral cataracts    Long-term use of high-risk medication    Lumbar radiculopathy, acute    Menopause    Benign hypertension with chronic kidney disease, stage III    Degenerative scoliosis in adult patient    Idiopathic neuropathy    Thrombocytopenia        Past Medical History:   Diagnosis Date    Anemia     Back pain     NUMBNESS/ TINGLINGPAIN DOWN BILAT LOWER EXTREMITIES    Cataract     Chronic kidney disease     STAGE III - FOLLOWED ONLY BY PCP    DDD (degenerative disc disease), lumbar     Female infertility     Gout     Heart murmur     NO PROBLEMS    Hyperlipidemia     Hypertension     RA (rheumatoid arthritis)     NO PROBLEMS WITH FLEX/ EXTENSION        Past Surgical History:   Procedure Laterality Date    COLONOSCOPY N/A 2021    Procedure: COLONOSCOPY INTO CECUM WITH COLD BIOPSY POLYPECTOMIES;  Surgeon: Chuy Bronson Jr., MD;  Location: [facility] ENDOSCOPY;  Service: General;  Laterality: N/A;  PRE- SCREENING  POST- POLYPS    EPIDURAL BLOCK      FOOT SURGERY Right     RHEUMATOID BONE SPURS REMOVED, GRAFT FROM KNEE    HYSTERECTOMY      THORACIC DECOMPRESSION POSTERIOR FUSION WITH INSTRUMENTATION N/A 2023    Procedure: T11-12 LAMINECTOMY FUSION;  Surgeon: Smith Montgomery MD;  Location: [facility] MAIN OR;  Service: Orthopedic Spine;   Laterality: N/A;        PT Ortho       Row Name 09/14/23 1000       Subjective    Subjective Comments pt states she tolerated intitial eval and ex's well but that yesterday and today her legs have felt really weak  -       Precautions and Contraindications    Precautions/Limitations spinal precautions  -    Precautions no heavy lifting (5-10# max) and no bending or twisting per pt  -       Subjective Pain    Able to rate subjective pain? yes  -    Pre-Treatment Pain Level 0  -MH    Post-Treatment Pain Level 0  -              User Key  (r) = Recorded By, (t) = Taken By, (c) = Cosigned By      Initials Name Provider Type     Grant Rose PTA Physical Therapist Assistant                                 PT Assessment/Plan       Row Name 09/14/23 1043          PT Assessment    Assessment Comments pt tolerated progression of reps well; improved alignment following MET; minimal to no pain being reported but complains of (B) LE weakness  -        PT Plan    PT Plan Comments Cont per POC  -               User Key  (r) = Recorded By, (t) = Taken By, (c) = Cosigned By      Initials Name Provider Type     Grant Rose PTA Physical Therapist Assistant                       OP Exercises       Row Name 09/14/23 1047 09/14/23 1000          Precautions    Existing Precautions/Restrictions -- spinal  no BLT's  -        Subjective    Subjective Comments -- pt states she tolerated intitial eval and ex's well but that yesterday and today her legs have felt really weak  -        Subjective Pain    Able to rate subjective pain? -- yes  -     Pre-Treatment Pain Level -- 0  -     Post-Treatment Pain Level -- 0  -        Total Minutes    32412 - PT Therapeutic Exercise Minutes 30  -MH --        Exercise 1    Exercise Name 1 -- supine GS  -     Cueing 1 -- Verbal;Tactile;Demo  -     Reps 1 -- 15  -MH     Time 1 -- 5 sec  -        Exercise 2    Exercise Name 2 -- PPT  -     Cueing 2 --  "Verbal;Tactile;Demo  -     Reps 2 -- 15  -     Time 2 -- 5 sec  -        Exercise 3    Exercise Name 3 -- PPT with ball squeeze  -     Cueing 3 -- Verbal;Tactile;Demo  -     Reps 3 -- 15  -     Time 3 -- 5 sec  -        Exercise 4    Exercise Name 4 -- PPT with hooklying clam shells vs TB  -MH     Cueing 4 -- Verbal;Tactile;Demo  -     Reps 4 -- 15  -     Time 4 -- 5 sec  -     Additional Comments -- blue  -        Exercise 5    Exercise Name 5 -- 4\" Forward step ups  -     Cueing 5 -- Verbal;Tactile;Demo  -     Reps 5 -- 10 each  -     Time 5 -- (L) handrail only  -        Exercise 6    Exercise Name 6 -- sit to stand  -     Cueing 6 -- Verbal;Tactile;Demo  -     Reps 6 -- 10  -     Time 6 -- BOSU on elevated treatment table  -     Additional Comments -- table elevated enough to eliminate need to use hands 2nd to flare of OA  -               User Key  (r) = Recorded By, (t) = Taken By, (c) = Cosigned By      Initials Name Provider Type    Grant Cuellar PTA Physical Therapist Assistant                             Manual Rx (last 36 hours)       Manual Treatments       Row Name 09/14/23 1000             Manual Rx 1    Manual Rx 1 Location Pelvis  -      Manual Rx 1 Type MET: shotgun/L anterior innominate/L pelvic outflare  -                User Key  (r) = Recorded By, (t) = Taken By, (c) = Cosigned By      Initials Name Provider Type    Grant Cuellar PTA Physical Therapist Assistant                        Therapy Education  Given: HEP, Symptoms/condition management  Program: Reinforced, Progressed  How Provided: Verbal, Demonstration  Provided to: Patient  Level of Understanding: Teach back education performed, Verbalized, Demonstrated              Time Calculation:   Start Time: 0955  Stop Time: 1028  Time Calculation (min): 33 min  Timed Charges  38912 - PT Therapeutic Exercise Minutes: 30  Total Minutes  Timed Charges Total Minutes: 30   Total Minutes: " 30  Therapy Charges for Today       Code Description Service Date Service Provider Modifiers Qty    99223859817 HC PT THER PROC EA 15 MIN 9/14/2023 Grant Rose, MYRTLE GP 2                      Grant Rose PTA  9/14/2023

## 2023-09-19 ENCOUNTER — HOSPITAL ENCOUNTER (OUTPATIENT)
Dept: PHYSICAL THERAPY | Facility: HOSPITAL | Age: 68
Setting detail: THERAPIES SERIES
Discharge: HOME | End: 2023-09-19
Payer: MEDICARE

## 2023-09-19 DIAGNOSIS — Z98.1 S/P LAMINECTOMY WITH SPINAL FUSION: ICD-10-CM

## 2023-09-19 DIAGNOSIS — M54.6 THORACIC SPINE PAIN: Primary | ICD-10-CM

## 2023-09-19 PROCEDURE — 97110 THERAPEUTIC EXERCISES: CPT

## 2023-09-19 NOTE — THERAPY TREATMENT NOTE
Outpatient Physical Therapy Ortho Treatment Note   Justine Trejo     Patient Name: Leann Andrew  : 1955  MRN: [id number]  Today's Date: 2023      Visit Date: 2023    Visit Dx:    ICD-10-CM ICD-9-CM   1. Thoracic spine pain  M54.6 724.1   2. S/P laminectomy with spinal fusion  Z98.1 V45.4       Patient Active Problem List   Diagnosis    RA (rheumatoid arthritis)    GERD (gastroesophageal reflux disease)    HTN (hypertension)    Hypercholesteremia    DDD (degenerative disc disease), lumbar    Screening for colon cancer    Thoracic spinal stenosis    Chronic kidney disease    Heart murmur    Back pain    Osteopenia    Anemia due to chronic illness    Bilateral cataracts    Long-term use of high-risk medication    Lumbar radiculopathy, acute    Menopause    Benign hypertension with chronic kidney disease, stage III    Degenerative scoliosis in adult patient    Idiopathic neuropathy    Thrombocytopenia        Past Medical History:   Diagnosis Date    Anemia     Back pain     NUMBNESS/ TINGLINGPAIN DOWN BILAT LOWER EXTREMITIES    Cataract     Chronic kidney disease     STAGE III - FOLLOWED ONLY BY PCP    DDD (degenerative disc disease), lumbar     Female infertility     Gout     Heart murmur     NO PROBLEMS    Hyperlipidemia     Hypertension     RA (rheumatoid arthritis)     NO PROBLEMS WITH FLEX/ EXTENSION        Past Surgical History:   Procedure Laterality Date    COLONOSCOPY N/A 2021    Procedure: COLONOSCOPY INTO CECUM WITH COLD BIOPSY POLYPECTOMIES;  Surgeon: Chuy Bronson Jr., MD;  Location: [facility] ENDOSCOPY;  Service: General;  Laterality: N/A;  PRE- SCREENING  POST- POLYPS    EPIDURAL BLOCK      FOOT SURGERY Right     RHEUMATOID BONE SPURS REMOVED, GRAFT FROM KNEE    HYSTERECTOMY      THORACIC DECOMPRESSION POSTERIOR FUSION WITH INSTRUMENTATION N/A 2023    Procedure: T11-12 LAMINECTOMY FUSION;  Surgeon: Smith Montgomery MD;  Location: [facility] MAIN OR;  Service: Orthopedic Spine;   Laterality: N/A;        PT Ortho       Row Name 09/19/23 1100       Subjective    Subjective Comments pt feels she is gaining some strength; primarily is still using her walker within the home but is using the cane some  -       Precautions and Contraindications    Precautions/Limitations spinal precautions  -MH    Precautions no heavy lifting (5-10# max) and no bending or twisting per pt  -MH              User Key  (r) = Recorded By, (t) = Taken By, (c) = Cosigned By      Initials Name Provider Type    Grant Cuellar PTA Physical Therapist Assistant                                 PT Assessment/Plan       Row Name 09/19/23 1209          PT Assessment    Assessment Comments pt reporting some improved overall strength and tolerating exercise program well; continues to need MET but corrected well  -        PT Plan    PT Plan Comments Cont per POC  -               User Key  (r) = Recorded By, (t) = Taken By, (c) = Cosigned By      Initials Name Provider Type     Grant Rose PTA Physical Therapist Assistant                       OP Exercises       Row Name 09/19/23 1210 09/19/23 1100          Precautions    Existing Precautions/Restrictions -- spinal  no BLT's  -        Subjective    Subjective Comments -- pt feels she is gaining some strength; primarily is still using her walker within the home but is using the cane some  -        Total Minutes    18848 - PT Therapeutic Exercise Minutes 30  -MH --        Exercise 1    Exercise Name 1 -- supine GS  -MH        Exercise 2    Exercise Name 2 -- PPT  -MH     Cueing 2 -- Verbal;Tactile;Demo  -MH     Reps 2 -- 15  -MH     Time 2 -- 5 sec  -MH        Exercise 3    Exercise Name 3 -- PPT with ball squeeze  -MH     Cueing 3 -- Verbal;Tactile;Demo  -MH     Reps 3 -- 15  -MH     Time 3 -- 5 sec  -MH        Exercise 4    Exercise Name 4 -- PPT with hooklying clam shells vs TB  -MH     Cueing 4 -- Verbal;Tactile;Demo  -MH     Reps 4 -- 15  -MH     Time 4 -- 5  "sec  -MH     Additional Comments -- blue  -MH        Exercise 5    Exercise Name 5 -- (B) SAQ  -MH     Cueing 5 -- Verbal;Tactile;Demo  -MH     Reps 5 -- 15  -MH     Time 5 -- 5 sec  -MH        Exercise 6    Exercise Name 6 -- sit to stand  -MH     Cueing 6 -- Verbal;Tactile;Demo  -MH     Reps 6 -- 10  -MH     Time 6 -- arm chair  -MH        Exercise 7    Exercise Name 7 -- 4\" Forward step ups  -MH     Cueing 7 -- Verbal  -MH     Reps 7 -- 15 each  -MH     Time 7 -- (L) handrail only  -MH               User Key  (r) = Recorded By, (t) = Taken By, (c) = Cosigned By      Initials Name Provider Type    Grant Cuellar PTA Physical Therapist Assistant                             Manual Rx (last 36 hours)       Manual Treatments       Row Name 09/19/23 1100             Manual Rx 1    Manual Rx 1 Location Pelvis  -      Manual Rx 1 Type MET: shotgun/L anterior innominate/L pelvic outflare  -                User Key  (r) = Recorded By, (t) = Taken By, (c) = Cosigned By      Initials Name Provider Type    Grant Cuellar PTA Physical Therapist Assistant                        Therapy Education  Education Details: written instruction of SAQ issued and reviewed  Given: HEP, Symptoms/condition management  Program: New, Reinforced  How Provided: Verbal, Demonstration, Written  Provided to: Patient  Level of Understanding: Teach back education performed, Verbalized, Demonstrated              Time Calculation:   Start Time: 1059  Stop Time: 1133  Time Calculation (min): 34 min  Timed Charges  92739 - PT Therapeutic Exercise Minutes: 30  Total Minutes  Timed Charges Total Minutes: 30   Total Minutes: 30  Therapy Charges for Today       Code Description Service Date Service Provider Modifiers Qty    41488414975 HC PT THER PROC EA 15 MIN 9/19/2023 Grant Rose PTA GP 2                      Grant Rose PTA  9/19/2023     "

## 2023-09-21 ENCOUNTER — HOSPITAL ENCOUNTER (OUTPATIENT)
Dept: PHYSICAL THERAPY | Facility: HOSPITAL | Age: 68
Setting detail: THERAPIES SERIES
Discharge: HOME | End: 2023-09-21
Payer: MEDICARE

## 2023-09-21 DIAGNOSIS — Z98.1 S/P LAMINECTOMY WITH SPINAL FUSION: ICD-10-CM

## 2023-09-21 DIAGNOSIS — M54.6 THORACIC SPINE PAIN: Primary | ICD-10-CM

## 2023-09-21 PROCEDURE — 97110 THERAPEUTIC EXERCISES: CPT

## 2023-09-21 NOTE — THERAPY TREATMENT NOTE
Outpatient Physical Therapy Ortho Treatment Note   Justine Trejo     Patient Name: Leann Andrew  : 1955  MRN: [id number]  Today's Date: 2023      Visit Date: 2023    Visit Dx:    ICD-10-CM ICD-9-CM   1. Thoracic spine pain  M54.6 724.1   2. S/P laminectomy with spinal fusion  Z98.1 V45.4       Patient Active Problem List   Diagnosis    RA (rheumatoid arthritis)    GERD (gastroesophageal reflux disease)    HTN (hypertension)    Hypercholesteremia    DDD (degenerative disc disease), lumbar    Screening for colon cancer    Thoracic spinal stenosis    Chronic kidney disease    Heart murmur    Back pain    Osteopenia    Anemia due to chronic illness    Bilateral cataracts    Long-term use of high-risk medication    Lumbar radiculopathy, acute    Menopause    Benign hypertension with chronic kidney disease, stage III    Degenerative scoliosis in adult patient    Idiopathic neuropathy    Thrombocytopenia        Past Medical History:   Diagnosis Date    Anemia     Back pain     NUMBNESS/ TINGLINGPAIN DOWN BILAT LOWER EXTREMITIES    Cataract     Chronic kidney disease     STAGE III - FOLLOWED ONLY BY PCP    DDD (degenerative disc disease), lumbar     Female infertility     Gout     Heart murmur     NO PROBLEMS    Hyperlipidemia     Hypertension     RA (rheumatoid arthritis)     NO PROBLEMS WITH FLEX/ EXTENSION        Past Surgical History:   Procedure Laterality Date    COLONOSCOPY N/A 2021    Procedure: COLONOSCOPY INTO CECUM WITH COLD BIOPSY POLYPECTOMIES;  Surgeon: Chuy Bronson Jr., MD;  Location: [facility] ENDOSCOPY;  Service: General;  Laterality: N/A;  PRE- SCREENING  POST- POLYPS    EPIDURAL BLOCK      FOOT SURGERY Right     RHEUMATOID BONE SPURS REMOVED, GRAFT FROM KNEE    HYSTERECTOMY      THORACIC DECOMPRESSION POSTERIOR FUSION WITH INSTRUMENTATION N/A 2023    Procedure: T11-12 LAMINECTOMY FUSION;  Surgeon: Smith Montgomery MD;  Location: [facility] MAIN OR;  Service: Orthopedic Spine;   "Laterality: N/A;        PT Ortho       Row Name 09/21/23 0900       Subjective    Subjective Comments Pt reports she is doing good and no c/o any LBP. Pt reports she is using her cane some and is going to try walking on her driveway with the cane this week. She is still primarily using FWW for ambulation. \"I probably use the cane 20% of the time in the house and the walker 80%.\" \"I had to use the walker prior to surgery because it got to where my legs were giving out on me all the time and I was falling; so I have gotten used to the walker I think and my left leg still occasionally feels like it might give out.\" Pt still reports neuropathy sx in legs at night and now on generic Cymbalta, which she reports is helping but only a little.  -LN       Precautions and Contraindications    Precautions/Limitations spinal precautions  -LN    Precautions no heavy lifting (5-10# max) and no bending or twisting per pt  -LN       Subjective Pain    Able to rate subjective pain? yes  -LN    Pre-Treatment Pain Level 0  -LN    Post-Treatment Pain Level 0  -LN              User Key  (r) = Recorded By, (t) = Taken By, (c) = Cosigned By      Initials Name Provider Type    Angie Borrego, PT Physical Therapist                                 PT Assessment/Plan       Row Name 09/21/23 1000          PT Assessment    Assessment Comments Pt continues to report no LBP and she is doing very well with exercises and her HEP. She is still primarily using her FWW for ambulation but is beginning to use her cane (\"hurry cane\") more at home. Feel pt has gotten used to ambulating with her FWW (even prior to surgery) and is not very confident with the cane yet. She does report her left leg occasionally still feels like it wants to give out on her and that makes her nervous to use the cane. She still needs to use her hands to perform sit to stand exercise; she tried doing it with her hands on her knees but unable to do. Overall her LE " "strength is improving but she does still fatigue easily.  -LN        PT Plan    PT Frequency 2x/week  -LN     PT Plan Comments Cont per POC; progress exercises as tolerated. Try sidelying clam shells next visit as tolerated. MH/CP PRN. Gait training with cane as needed.  -LN               User Key  (r) = Recorded By, (t) = Taken By, (c) = Cosigned By      Initials Name Provider Type    Angie Borrego, PT Physical Therapist                     Modalities       Row Name 09/21/23 0900             Moist Heat    Patient denies application of MH Yes  -LN                User Key  (r) = Recorded By, (t) = Taken By, (c) = Cosigned By      Initials Name Provider Type    Angie Borrego, PT Physical Therapist                   OP Exercises       Row Name 09/21/23 1000 09/21/23 0900          Precautions    Existing Precautions/Restrictions -- spinal  no BLT's  -LN        Subjective    Subjective Comments -- Pt reports she is doing good and no c/o any LBP. Pt reports she is using her cane some and is going to try walking on her driveway with the cane this week. She is still primarily using FWW for ambulation. \"I probably use the cane 20% of the time in the house and the walker 80%.\" \"I had to use the walker prior to surgery because it got to where my legs were giving out on me all the time and I was falling; so I have gotten used to the walker I think and my left leg still occasionally feels like it might give out.\" Pt still reports neuropathy sx in legs at night and now on generic Cymbalta, which she reports is helping but only a little.  -LN        Subjective Pain    Able to rate subjective pain? -- yes  -LN     Pre-Treatment Pain Level -- 0  -LN     Post-Treatment Pain Level -- 0  -LN        Total Minutes    36121 - PT Therapeutic Exercise Minutes 30  -LN --     49510 - PT Manual Therapy Minutes -- 6  -LN        Exercise 1    Exercise Name 1 supine GS  -LN --     Cueing 1 Verbal;Tactile  -LN --     Reps " "1 15  -LN --     Time 1 5sec  -LN --        Exercise 2    Exercise Name 2 PPT  -LN --     Cueing 2 Verbal;Tactile;Demo  -LN --     Reps 2 15  -LN --     Time 2 5 sec  -LN --        Exercise 3    Exercise Name 3 PPT with ball squeeze  -LN --     Cueing 3 Verbal;Tactile;Demo  -LN --     Reps 3 15  -LN --     Time 3 5 sec  -LN --        Exercise 4    Exercise Name 4 PPT with hooklying clam shells vs TB  -LN --     Cueing 4 Verbal;Tactile;Demo  -LN --     Reps 4 15  -LN --     Time 4 5 sec  -LN --     Additional Comments blue  -LN --        Exercise 5    Exercise Name 5 (B) SAQ  -LN --     Cueing 5 Verbal;Tactile;Demo  -LN --     Reps 5 15  -LN --     Time 5 5 sec  -LN --        Exercise 6    Exercise Name 6 sit to stand  -LN --     Cueing 6 Verbal;Tactile;Demo  -LN --     Reps 6 --  -LN --     Time 6 arm chair  -LN --        Exercise 7    Exercise Name 7 4\" Forward step ups  -LN --     Cueing 7 Verbal  -LN --     Reps 7 15 each  -LN --     Time 7 (L) handrail only  -LN --        Exercise 8    Exercise Name 8 SLR with stomach muscles tight  -LN --     Cueing 8 Verbal;Tactile;Demo  -LN --     Reps 8 10 each leg  -LN --     Time 8 2-3 sec  -LN --               User Key  (r) = Recorded By, (t) = Taken By, (c) = Cosigned By      Initials Name Provider Type    Angie Borrego, PT Physical Therapist                             Manual Rx (last 36 hours)       Manual Treatments       Row Name 09/21/23 0900             Total Minutes    34851 - PT Manual Therapy Minutes 6  -LN         Manual Rx 1    Manual Rx 1 Location Pelvis  -LN      Manual Rx 1 Type MET: shotgun/L anterior innominate/L pelvic outflare  -LN      Manual Rx 1 Duration Improved pelvic alignment noted after MET.  -LN                User Key  (r) = Recorded By, (t) = Taken By, (c) = Cosigned By      Initials Name Provider Type    Angie Borrego, PT Physical Therapist                        Therapy Education  Education Details: Pt to add SLR " with stomach muscles tight to HEP as tolerated. Pt to use MH/CP PRN. Pt to gradually increase how often she is using cane to ambulate with vs FWW and to try walking on her driveway with cane this weekend (with  present for [employer]).  Given: HEP, Symptoms/condition management  Program: New, Reinforced, Progressed (Added SLR with stomach muscles tight)  How Provided: Verbal, Demonstration, Written  Provided to: Patient  Level of Understanding: Teach back education performed, Verbalized, Demonstrated              Time Calculation:   Start Time: 1000  Stop Time: 1035  Time Calculation (min): 35 min  Timed Charges  13412 - PT Therapeutic Exercise Minutes: 30  07698 - PT Manual Therapy Minutes: 6  Total Minutes  Timed Charges Total Minutes: 30   Total Minutes: 30  Therapy Charges for Today       Code Description Service Date Service Provider Modifiers Qty    02517296830 HC PT THER PROC EA 15 MIN 9/21/2023 Angie Carter, PT GP 2                      Angie Carter, PT  9/21/2023

## 2023-09-26 ENCOUNTER — HOSPITAL ENCOUNTER (OUTPATIENT)
Dept: PHYSICAL THERAPY | Facility: HOSPITAL | Age: 68
Setting detail: THERAPIES SERIES
Discharge: HOME | End: 2023-09-26
Payer: MEDICARE

## 2023-09-26 DIAGNOSIS — M54.6 THORACIC SPINE PAIN: Primary | ICD-10-CM

## 2023-09-26 DIAGNOSIS — Z98.1 S/P LAMINECTOMY WITH SPINAL FUSION: ICD-10-CM

## 2023-09-26 PROCEDURE — 97110 THERAPEUTIC EXERCISES: CPT

## 2023-09-26 NOTE — THERAPY TREATMENT NOTE
Outpatient Physical Therapy Ortho Treatment Note   Justine Trejo     Patient Name: Leann Andrew  : 1955  MRN: [id number]  Today's Date: 2023      Visit Date: 2023    Visit Dx:    ICD-10-CM ICD-9-CM   1. Thoracic spine pain  M54.6 724.1   2. S/P laminectomy with spinal fusion  Z98.1 V45.4       Patient Active Problem List   Diagnosis    RA (rheumatoid arthritis)    GERD (gastroesophageal reflux disease)    HTN (hypertension)    Hypercholesteremia    DDD (degenerative disc disease), lumbar    Screening for colon cancer    Thoracic spinal stenosis    Chronic kidney disease    Heart murmur    Back pain    Osteopenia    Anemia due to chronic illness    Bilateral cataracts    Long-term use of high-risk medication    Lumbar radiculopathy, acute    Menopause    Benign hypertension with chronic kidney disease, stage III    Degenerative scoliosis in adult patient    Idiopathic neuropathy    Thrombocytopenia        Past Medical History:   Diagnosis Date    Anemia     Back pain     NUMBNESS/ TINGLINGPAIN DOWN BILAT LOWER EXTREMITIES    Cataract     Chronic kidney disease     STAGE III - FOLLOWED ONLY BY PCP    DDD (degenerative disc disease), lumbar     Female infertility     Gout     Heart murmur     NO PROBLEMS    Hyperlipidemia     Hypertension     RA (rheumatoid arthritis)     NO PROBLEMS WITH FLEX/ EXTENSION        Past Surgical History:   Procedure Laterality Date    COLONOSCOPY N/A 2021    Procedure: COLONOSCOPY INTO CECUM WITH COLD BIOPSY POLYPECTOMIES;  Surgeon: Chuy Bronson Jr., MD;  Location: [facility] ENDOSCOPY;  Service: General;  Laterality: N/A;  PRE- SCREENING  POST- POLYPS    EPIDURAL BLOCK      FOOT SURGERY Right     RHEUMATOID BONE SPURS REMOVED, GRAFT FROM KNEE    HYSTERECTOMY      THORACIC DECOMPRESSION POSTERIOR FUSION WITH INSTRUMENTATION N/A 2023    Procedure: T11-12 LAMINECTOMY FUSION;  Surgeon: Smith Montgomery MD;  Location: [facility] MAIN OR;  Service: Orthopedic Spine;   Laterality: N/A;        PT Ortho       Row Name 09/26/23 0800       Subjective    Subjective Comments pt reports she continues to do well in regards to back pain; states she has been diagnosed with spinal stenosis and asking if her current ex's will be helpful; continues to feel weak with sit/stand  -       Precautions and Contraindications    Precautions/Limitations spinal precautions  -    Precautions no heavy lifting (5-10# max) and no bending or twisting per pt  -              User Key  (r) = Recorded By, (t) = Taken By, (c) = Cosigned By      Initials Name Provider Type    Grant Cuellar PTA Physical Therapist Assistant                                 PT Assessment/Plan       Row Name 09/26/23 0944          PT Assessment    Assessment Comments continues to require MET but corrects well; pt is progressing well and tolerating ex's well - continues to struggle with sit to stand without the use of her hands  -        PT Plan    PT Plan Comments Cont per POC  -               User Key  (r) = Recorded By, (t) = Taken By, (c) = Cosigned By      Initials Name Provider Type    Grant Cuellar PTA Physical Therapist Assistant                       OP Exercises       Row Name 09/26/23 0946 09/26/23 0800          Subjective    Subjective Comments -- pt reports she continues to do well in regards to back pain; states she has been diagnosed with spinal stenosis and asking if her current ex's will be helpful; continues to feel weak with sit/stand  -        Total Minutes    40461 - PT Therapeutic Exercise Minutes 30  -MH --        Exercise 1    Exercise Name 1 -- supine GS  -     Cueing 1 -- Verbal;Tactile  -     Reps 1 -- 15  -MH     Time 1 -- 5sec  -        Exercise 2    Exercise Name 2 -- PPT  -     Cueing 2 -- Verbal;Tactile;Demo  -     Reps 2 -- 15  -MH     Time 2 -- 5 sec  -        Exercise 3    Exercise Name 3 -- PPT with ball squeeze  -     Cueing 3 -- Verbal;Tactile;Demo  -     Reps  "3 -- 15  -MH     Time 3 -- 5 sec  -MH        Exercise 4    Exercise Name 4 -- PPT with hooklying clam shells vs TB  -MH     Cueing 4 -- Verbal;Tactile;Demo  -MH     Reps 4 -- 15  -MH     Time 4 -- 5 sec  -MH     Additional Comments -- blue  -MH        Exercise 5    Exercise Name 5 -- (B) SAQ  -MH     Cueing 5 -- Verbal;Tactile;Demo  -MH     Reps 5 -- 15  -MH     Time 5 -- 5 sec  -MH        Exercise 6    Exercise Name 6 -- sit to stand  -MH     Cueing 6 -- Verbal;Tactile;Demo  -MH     Time 6 -- arm chair  -MH        Exercise 7    Exercise Name 7 -- 4\" Forward step ups  -MH     Cueing 7 -- Verbal  -MH     Reps 7 -- 15 each  -MH     Time 7 -- (L) handrail only  -MH        Exercise 8    Exercise Name 8 -- SLR with stomach muscles tight  -MH     Cueing 8 -- Verbal;Tactile;Demo  -     Reps 8 -- 10 each leg  -MH     Time 8 -- 2-3 sec  -MH        Exercise 9    Exercise Name 9 -- (B) sidellying clams  -MH     Cueing 9 -- Verbal;Tactile;Demo  -MH     Reps 9 -- 10  -MH     Time 9 -- blue  -MH        Exercise 10    Exercise Name 10 -- (B) hamstring stretch  -MH     Cueing 10 -- Verbal;Tactile;Demo  -MH     Reps 10 -- 5  -MH     Time 10 -- 10 sec  -MH               User Key  (r) = Recorded By, (t) = Taken By, (c) = Cosigned By      Initials Name Provider Type    Grant Cuellar PTA Physical Therapist Assistant                             Manual Rx (last 36 hours)       Manual Treatments       Row Name 09/26/23 0800             Manual Rx 1    Manual Rx 1 Location Pelvis  -      Manual Rx 1 Type MET: shotgun/L anterior innominate/L pelvic outflare  -                User Key  (r) = Recorded By, (t) = Taken By, (c) = Cosigned By      Initials Name Provider Type    Grant Cuellar PTA Physical Therapist Assistant                        Therapy Education  Education Details: written instruction of hamstring stretch and clam issued and reviewed  Given: HEP, Symptoms/condition management  Program: New, Reinforced  How " Provided: Verbal, Written, Demonstration  Provided to: Patient  Level of Understanding: Teach back education performed, Verbalized, Demonstrated              Time Calculation:   Start Time: 0904  Stop Time: 0935  Time Calculation (min): 31 min  Timed Charges  63287 - PT Therapeutic Exercise Minutes: 30  Total Minutes  Timed Charges Total Minutes: 30   Total Minutes: 30  Therapy Charges for Today       Code Description Service Date Service Provider Modifiers Qty    46231033599 HC PT THER PROC EA 15 MIN 9/26/2023 Grant Rose, MYRTLE GP 2                      Grant Rose PTA  9/26/2023

## 2023-09-28 ENCOUNTER — HOSPITAL ENCOUNTER (OUTPATIENT)
Dept: PHYSICAL THERAPY | Facility: HOSPITAL | Age: 68
Setting detail: THERAPIES SERIES
Discharge: HOME | End: 2023-09-28
Payer: MEDICARE

## 2023-09-28 DIAGNOSIS — M54.6 THORACIC SPINE PAIN: Primary | ICD-10-CM

## 2023-09-28 DIAGNOSIS — Z98.1 S/P LAMINECTOMY WITH SPINAL FUSION: ICD-10-CM

## 2023-09-28 PROCEDURE — 97110 THERAPEUTIC EXERCISES: CPT

## 2023-09-28 NOTE — THERAPY TREATMENT NOTE
Outpatient Physical Therapy Ortho Treatment Note   Justine Trejo     Patient Name: Leann Andrew  : 1955  MRN: [id number]  Today's Date: 2023      Visit Date: 2023    Visit Dx:    ICD-10-CM ICD-9-CM   1. Thoracic spine pain  M54.6 724.1   2. S/P laminectomy with spinal fusion  Z98.1 V45.4       Patient Active Problem List   Diagnosis    RA (rheumatoid arthritis)    GERD (gastroesophageal reflux disease)    HTN (hypertension)    Hypercholesteremia    DDD (degenerative disc disease), lumbar    Screening for colon cancer    Thoracic spinal stenosis    Chronic kidney disease    Heart murmur    Back pain    Osteopenia    Anemia due to chronic illness    Bilateral cataracts    Long-term use of high-risk medication    Lumbar radiculopathy, acute    Menopause    Benign hypertension with chronic kidney disease, stage III    Degenerative scoliosis in adult patient    Idiopathic neuropathy    Thrombocytopenia        Past Medical History:   Diagnosis Date    Anemia     Back pain     NUMBNESS/ TINGLINGPAIN DOWN BILAT LOWER EXTREMITIES    Cataract     Chronic kidney disease     STAGE III - FOLLOWED ONLY BY PCP    DDD (degenerative disc disease), lumbar     Female infertility     Gout     Heart murmur     NO PROBLEMS    Hyperlipidemia     Hypertension     RA (rheumatoid arthritis)     NO PROBLEMS WITH FLEX/ EXTENSION        Past Surgical History:   Procedure Laterality Date    COLONOSCOPY N/A 2021    Procedure: COLONOSCOPY INTO CECUM WITH COLD BIOPSY POLYPECTOMIES;  Surgeon: Chuy Bronson Jr., MD;  Location: [facility] ENDOSCOPY;  Service: General;  Laterality: N/A;  PRE- SCREENING  POST- POLYPS    EPIDURAL BLOCK      FOOT SURGERY Right     RHEUMATOID BONE SPURS REMOVED, GRAFT FROM KNEE    HYSTERECTOMY      THORACIC DECOMPRESSION POSTERIOR FUSION WITH INSTRUMENTATION N/A 2023    Procedure: T11-12 LAMINECTOMY FUSION;  Surgeon: Smith Montgomery MD;  Location: [facility] MAIN OR;  Service: Orthopedic Spine;   Laterality: N/A;        PT Ortho       Row Name 09/28/23 1000       Subjective    Subjective Comments pt reports her legs were really tired yesterday and feels it may be due to trying to go more without her walker  -       Precautions and Contraindications    Precautions/Limitations spinal precautions  -    Precautions no heavy lifting (5-10# max) and no bending or twisting per pt  -              User Key  (r) = Recorded By, (t) = Taken By, (c) = Cosigned By      Initials Name Provider Type     Grant Rose PTA Physical Therapist Assistant                                 PT Assessment/Plan       Row Name 09/28/23 1334          PT Assessment    Assessment Comments pt continues to need MET but does correct well; pt progressing with reps with fatigue but no complaints of pain; continues to have difficulty with sit to stand but did demonstrate improved tolerance today  -        PT Plan    PT Plan Comments Cont per POC; pt to progress HEP as in clinic  -               User Key  (r) = Recorded By, (t) = Taken By, (c) = Cosigned By      Initials Name Provider Type     Grant Rose PTA Physical Therapist Assistant                       OP Exercises       Row Name 09/28/23 1339 09/28/23 1000          Subjective    Subjective Comments -- pt reports her legs were really tired yesterday and feels it may be due to trying to go more without her walker  -        Total Minutes    15416 - PT Therapeutic Exercise Minutes 35  -MH --        Exercise 1    Exercise Name 1 -- supine GS  -     Cueing 1 -- Verbal;Tactile  -     Reps 1 -- 20  -MH     Time 1 -- 5sec  -MH        Exercise 2    Exercise Name 2 -- PPT  -     Cueing 2 -- Verbal;Tactile;Demo  -     Reps 2 -- 20  -MH     Time 2 -- 5 sec  -MH        Exercise 3    Exercise Name 3 -- PPT with ball squeeze  -     Cueing 3 -- Verbal;Tactile;Demo  -MH     Reps 3 -- 20  -MH     Time 3 -- 5 sec  -MH        Exercise 4    Exercise Name 4 -- PPT with hooklying  "clam shells vs TB  -MH     Cueing 4 -- Verbal;Tactile;Demo  -MH     Reps 4 -- 20  -MH     Time 4 -- 5 sec  -MH     Additional Comments -- blue  -MH        Exercise 5    Exercise Name 5 -- (B) SAQ  -MH     Cueing 5 -- Verbal;Tactile;Demo  -MH     Reps 5 -- 20  -MH     Time 5 -- 5 sec  -MH        Exercise 6    Exercise Name 6 -- sit to stand  -MH     Cueing 6 -- Verbal;Tactile;Demo  -     Reps 6 -- 10  -MH     Time 6 -- elvated table  -        Exercise 7    Exercise Name 7 -- 4\" Forward step ups  -MH     Cueing 7 -- Verbal  -MH     Reps 7 -- 15 each  -MH     Time 7 -- (L) handrail only  -        Exercise 8    Exercise Name 8 -- (B) SLR with stomach muscles tight  -MH     Cueing 8 -- Verbal;Tactile;Demo  -MH     Reps 8 -- 15  -MH     Time 8 -- 2-3 sec  -MH        Exercise 9    Exercise Name 9 -- (B) sidellying clams  -MH     Cueing 9 -- Verbal;Tactile;Demo  -     Reps 9 -- 15  -MH     Time 9 -- blue  -MH        Exercise 10    Exercise Name 10 -- (B) hamstring stretch  -MH     Cueing 10 -- Verbal;Tactile;Demo  -     Reps 10 -- 10  -MH     Time 10 -- 10 sec  -MH               User Key  (r) = Recorded By, (t) = Taken By, (c) = Cosigned By      Initials Name Provider Type    Grant Cuellar PTA Physical Therapist Assistant                             Manual Rx (last 36 hours)       Manual Treatments       Row Name 09/28/23 1000             Manual Rx 1    Manual Rx 1 Location Pelvis  -      Manual Rx 1 Type MET: shotgun/L anterior innominate/L pelvic outflare  -                User Key  (r) = Recorded By, (t) = Taken By, (c) = Cosigned By      Initials Name Provider Type    Grant Cuellar PTA Physical Therapist Assistant                        Therapy Education  Given: HEP, Symptoms/condition management  Program: Reinforced, Progressed  How Provided: Verbal, Demonstration  Provided to: Patient  Level of Understanding: Teach back education performed, Verbalized, Demonstrated              Time " Calculation:   Start Time: 0955  Stop Time: 1034  Time Calculation (min): 39 min  Timed Charges  64067 - PT Therapeutic Exercise Minutes: 35  Total Minutes  Timed Charges Total Minutes: 35   Total Minutes: 35  Therapy Charges for Today       Code Description Service Date Service Provider Modifiers Qty    41002979394 HC PT THER PROC EA 15 MIN 9/28/2023 Grant Rose, PTA GP 2                      Grant Rose PTA  9/28/2023

## 2023-10-04 ENCOUNTER — HOSPITAL ENCOUNTER (OUTPATIENT)
Dept: PHYSICAL THERAPY | Facility: HOSPITAL | Age: 68
Setting detail: THERAPIES SERIES
Discharge: HOME | End: 2023-10-04
Payer: MEDICARE

## 2023-10-04 DIAGNOSIS — Z98.1 S/P LAMINECTOMY WITH SPINAL FUSION: ICD-10-CM

## 2023-10-04 DIAGNOSIS — M54.6 THORACIC SPINE PAIN: Primary | ICD-10-CM

## 2023-10-04 PROCEDURE — 97116 GAIT TRAINING THERAPY: CPT

## 2023-10-04 PROCEDURE — 97110 THERAPEUTIC EXERCISES: CPT

## 2023-10-04 NOTE — THERAPY TREATMENT NOTE
Outpatient Physical Therapy Ortho Treatment Note   Justine Trejo     Patient Name: Leann Andrew  : 1955  MRN: [id number]  Today's Date: 10/4/2023      Visit Date: 10/04/2023    Visit Dx:    ICD-10-CM ICD-9-CM   1. Thoracic spine pain  M54.6 724.1   2. S/P laminectomy with spinal fusion  Z98.1 V45.4       Patient Active Problem List   Diagnosis    RA (rheumatoid arthritis)    GERD (gastroesophageal reflux disease)    HTN (hypertension)    Hypercholesteremia    DDD (degenerative disc disease), lumbar    Screening for colon cancer    Thoracic spinal stenosis    Chronic kidney disease    Heart murmur    Back pain    Osteopenia    Anemia due to chronic illness    Bilateral cataracts    Long-term use of high-risk medication    Lumbar radiculopathy, acute    Menopause    Benign hypertension with chronic kidney disease, stage III    Degenerative scoliosis in adult patient    Idiopathic neuropathy    Thrombocytopenia        Past Medical History:   Diagnosis Date    Anemia     Back pain     NUMBNESS/ TINGLINGPAIN DOWN BILAT LOWER EXTREMITIES    Cataract     Chronic kidney disease     STAGE III - FOLLOWED ONLY BY PCP    DDD (degenerative disc disease), lumbar     Female infertility     Gout     Heart murmur     NO PROBLEMS    Hyperlipidemia     Hypertension     RA (rheumatoid arthritis)     NO PROBLEMS WITH FLEX/ EXTENSION        Past Surgical History:   Procedure Laterality Date    COLONOSCOPY N/A 2021    Procedure: COLONOSCOPY INTO CECUM WITH COLD BIOPSY POLYPECTOMIES;  Surgeon: Chuy Bronson Jr., MD;  Location: [facility] ENDOSCOPY;  Service: General;  Laterality: N/A;  PRE- SCREENING  POST- POLYPS    EPIDURAL BLOCK      FOOT SURGERY Right     RHEUMATOID BONE SPURS REMOVED, GRAFT FROM KNEE    HYSTERECTOMY      THORACIC DECOMPRESSION POSTERIOR FUSION WITH INSTRUMENTATION N/A 2023    Procedure: T11-12 LAMINECTOMY FUSION;  Surgeon: Smith Montgomery MD;  Location: [facility] MAIN OR;  Service: Orthopedic Spine;   Laterality: N/A;        PT Ortho       Row Name 10/04/23 1000       Subjective    Subjective Comments pt states she still uses the walker some, especially as her back fatigues during the day; pt still with minimal reports of pain; continues to feel anxiety about going down basement steps.  -       Precautions and Contraindications    Precautions/Limitations spinal precautions  -    Precautions no heavy lifting (5-10# max) and no bending or twisting per pt  -       Gait/Stairs (Locomotion)    [place] Level (Stairs) verbal cues;contact guard  -    Assistive Device (Stairs) cane, straight  -    Handrail Location (Stairs) left side (descending);right side (ascending)  -    Number of Steps (Stairs) 14  -    Ascending Technique (Stairs) step-to-step  -    Descending Technique (Stairs) step-to-step  -    Comment, (Gait/Stairs) Pt ambulates into clinic with SPC using correct technique but did adjust height of cane.  Pt ambulated within clinic w/adjusted height and tolerated well.  -              User Key  (r) = Recorded By, (t) = Taken By, (c) = Cosigned By      Initials Name Provider Type     Grant Rose PTA Physical Therapist Assistant                                 PT Assessment/Plan       Row Name 10/04/23 1438          PT Assessment    Assessment Comments Good correction with MET; improving strength noted as pt is tolerating increased gait with cane - including community distances; pt fatigued with managing stairs during clinic today but demonstrates good sequencing and balance  -        PT Plan    PT Plan Comments Cont per POC  -               User Key  (r) = Recorded By, (t) = Taken By, (c) = Cosigned By      Initials Name Provider Type    Grant Cuellar PTA Physical Therapist Assistant                       OP Exercises       Row Name 10/04/23 1442 10/04/23 1000          Subjective    Subjective Comments -- pt states she still uses the walker some, especially as her back  "fatigues during the day; pt still with minimal reports of pain; continues to feel anxiety about going down basement steps.  -        Total Minutes    64988 - Gait Training Minutes  10  - --     61495 - PT Therapeutic Exercise Minutes 35  -MH --        Exercise 1    Exercise Name 1 -- supine GS  -     Cueing 1 -- Verbal;Tactile  -     Reps 1 -- 20  -     Time 1 -- 5sec  -        Exercise 2    Exercise Name 2 -- PPT  -     Cueing 2 -- Verbal;Tactile;Demo  -     Reps 2 -- 20  -     Time 2 -- 5 sec  -        Exercise 3    Exercise Name 3 -- PPT with ball squeeze  -     Cueing 3 -- Verbal;Tactile;Demo  -     Reps 3 -- 20  -     Time 3 -- 5 sec  -        Exercise 4    Exercise Name 4 -- PPT with hooklying clam shells vs TB  -     Cueing 4 -- Verbal;Tactile;Demo  -     Reps 4 -- 20  -     Time 4 -- 5 sec  -     Additional Comments -- blue  -        Exercise 5    Exercise Name 5 -- (B) SAQ  -     Cueing 5 -- Verbal;Tactile;Demo  -     Reps 5 -- 20  -     Time 5 -- 5 sec  -        Exercise 6    Exercise Name 6 -- sit to stand  -     Cueing 6 -- Verbal;Tactile;Demo  -     Reps 6 -- 10 reps total  -     Time 6 -- low mat table plus BOSU  -     Additional Comments -- 5 reps with full sit down and 5 reps with pt coming back to stand once she touched to BOSU  -        Exercise 7    Exercise Name 7 -- 6\" Forward step ups  -     Cueing 7 -- Verbal  -     Reps 7 -- 15 each  -     Time 7 -- (L) handrail only  -     Additional Comments -- progressed to 6\" step  -        Exercise 8    Exercise Name 8 -- (B) SLR with stomach muscles tight  -     Cueing 8 -- Verbal;Tactile  -     Reps 8 -- 15  -     Time 8 -- 2-3 sec  -        Exercise 9    Exercise Name 9 -- (B) sidellying clams  -     Cueing 9 -- Verbal;Tactile;Demo  -     Reps 9 -- 15  -     Time 9 -- blue  -        Exercise 10    Exercise Name 10 -- (B) hamstring stretch  -     Cueing 10 -- " Verbal;Tactile;Demo  -MH     Reps 10 -- 10  -MH     Time 10 -- 10 sec  -MH               User Key  (r) = Recorded By, (t) = Taken By, (c) = Cosigned By      Initials Name Provider Type    Grant Cuellar PTA Physical Therapist Assistant                             Manual Rx (last 36 hours)       Manual Treatments       Row Name 10/04/23 1000             Manual Rx 1    Manual Rx 1 Location Pelvis  -MH      Manual Rx 1 Type MET: shotgun/(L) anterior innominate  -                User Key  (r) = Recorded By, (t) = Taken By, (c) = Cosigned By      Initials Name Provider Type    Grant Cuellar PTA Physical Therapist Assistant                        Therapy Education  Given: HEP, Symptoms/condition management, Mobility training  Program: Reinforced  How Provided: Verbal, Demonstration  Provided to: Patient  Level of Understanding: Teach back education performed, Verbalized, Demonstrated              Time Calculation:   Start Time: 0950  Stop Time: 1038  Time Calculation (min): 48 min  Timed Charges  02883 - PT Therapeutic Exercise Minutes: 35  09494 - Gait Training Minutes : 10  Total Minutes  Timed Charges Total Minutes: 45   Total Minutes: 45  Therapy Charges for Today       Code Description Service Date Service Provider Modifiers Qty    01242379592 HC PT THER PROC EA 15 MIN 10/4/2023 Grant Rose PTA GP 2    86211347401 HC GAIT TRAINING EA 15 MIN 10/4/2023 Grant Rose PTA GP 1                      Grant Rose PTA  10/4/2023

## 2023-10-06 ENCOUNTER — HOSPITAL ENCOUNTER (OUTPATIENT)
Dept: PHYSICAL THERAPY | Facility: HOSPITAL | Age: 68
Setting detail: THERAPIES SERIES
Discharge: HOME | End: 2023-10-06
Payer: MEDICARE

## 2023-10-06 DIAGNOSIS — Z98.1 S/P LAMINECTOMY WITH SPINAL FUSION: ICD-10-CM

## 2023-10-06 DIAGNOSIS — M54.6 THORACIC SPINE PAIN: Primary | ICD-10-CM

## 2023-10-06 PROCEDURE — 97110 THERAPEUTIC EXERCISES: CPT

## 2023-10-06 NOTE — THERAPY TREATMENT NOTE
Outpatient Physical Therapy Ortho Treatment Note   Justine Trejo     Patient Name: Leann Andrew  : 1955  MRN: [id number]  Today's Date: 10/6/2023      Visit Date: 10/06/2023    Visit Dx:    ICD-10-CM ICD-9-CM   1. Thoracic spine pain  M54.6 724.1   2. S/P laminectomy with spinal fusion  Z98.1 V45.4       Patient Active Problem List   Diagnosis    RA (rheumatoid arthritis)    GERD (gastroesophageal reflux disease)    HTN (hypertension)    Hypercholesteremia    DDD (degenerative disc disease), lumbar    Screening for colon cancer    Thoracic spinal stenosis    Chronic kidney disease    Heart murmur    Back pain    Osteopenia    Anemia due to chronic illness    Bilateral cataracts    Long-term use of high-risk medication    Lumbar radiculopathy, acute    Menopause    Benign hypertension with chronic kidney disease, stage III    Degenerative scoliosis in adult patient    Idiopathic neuropathy    Thrombocytopenia        Past Medical History:   Diagnosis Date    Anemia     Back pain     NUMBNESS/ TINGLINGPAIN DOWN BILAT LOWER EXTREMITIES    Cataract     Chronic kidney disease     STAGE III - FOLLOWED ONLY BY PCP    DDD (degenerative disc disease), lumbar     Female infertility     Gout     Heart murmur     NO PROBLEMS    Hyperlipidemia     Hypertension     RA (rheumatoid arthritis)     NO PROBLEMS WITH FLEX/ EXTENSION        Past Surgical History:   Procedure Laterality Date    COLONOSCOPY N/A 2021    Procedure: COLONOSCOPY INTO CECUM WITH COLD BIOPSY POLYPECTOMIES;  Surgeon: Chuy Bronson Jr., MD;  Location: [facility] ENDOSCOPY;  Service: General;  Laterality: N/A;  PRE- SCREENING  POST- POLYPS    EPIDURAL BLOCK      FOOT SURGERY Right     RHEUMATOID BONE SPURS REMOVED, GRAFT FROM KNEE    HYSTERECTOMY      THORACIC DECOMPRESSION POSTERIOR FUSION WITH INSTRUMENTATION N/A 2023    Procedure: T11-12 LAMINECTOMY FUSION;  Surgeon: Smith Montgomery MD;  Location: [facility] MAIN OR;  Service: Orthopedic Spine;   Laterality: N/A;        PT Ortho       Row Name 10/06/23 1000       Subjective    Subjective Comments pt states she hasn't tried basement steps yet - would like to try a couple more times in clinic; continues to have minimal back pain but (B) LE's continue to fatigue easily; back also fatigues quickly when using the cane  -       Precautions and Contraindications    Precautions/Limitations spinal precautions  -    Precautions no heavy lifting (5-10# max) and no bending or twisting per pt  -       Gait/Stairs (Locomotion)    [place] Level (Stairs) verbal cues;contact guard  -    Assistive Device (Stairs) cane, straight  -    Handrail Location (Stairs) left side (descending);right side (ascending)  -    Number of Steps (Stairs) 14  -    Ascending Technique (Stairs) step-to-step  -    Descending Technique (Stairs) step-to-step  -              User Key  (r) = Recorded By, (t) = Taken By, (c) = Cosigned By      Initials Name Provider Type    Grant Cuellar PTA Physical Therapist Assistant                                 PT Assessment/Plan       Row Name 10/06/23 1035          PT Assessment    Assessment Comments pt with good pelvic alignment this session - no MET required; pt tolerated ex's well and demonstrates increased strength with sit/stand ex's  -        PT Plan    PT Plan Comments Continue daily HEP and weekly clinic appts  -               User Key  (r) = Recorded By, (t) = Taken By, (c) = Cosigned By      Initials Name Provider Type    Grant Cuellar PTA Physical Therapist Assistant                       OP Exercises       Row Name 10/06/23 1040 10/06/23 1000          Subjective    Subjective Comments -- pt states she hasn't tried basement steps yet - would like to try a couple more times in clinic; continues to have minimal back pain but (B) LE's continue to fatigue easily; back also fatigues quickly when using the cane  -        Total Minutes    65825 - PT Therapeutic  "Exercise Minutes 40  -MH --        Exercise 1    Exercise Name 1 -- supine GS  -MH     Cueing 1 -- Verbal;Tactile  -MH     Reps 1 -- 20  -MH     Time 1 -- 5sec  -MH        Exercise 2    Exercise Name 2 -- PPT  -MH     Cueing 2 -- Verbal;Tactile;Demo  -MH     Reps 2 -- 20  -MH     Time 2 -- 5 sec  -MH        Exercise 3    Exercise Name 3 -- PPT with ball squeeze  -MH     Cueing 3 -- Verbal;Tactile;Demo  -MH     Reps 3 -- 20  -MH     Time 3 -- 5 sec  -MH        Exercise 4    Exercise Name 4 -- PPT with hooklying clam shells vs TB  -MH     Cueing 4 -- Verbal;Tactile;Demo  -MH     Reps 4 -- 20  -MH     Time 4 -- 5 sec  -MH     Additional Comments -- blue  -        Exercise 5    Exercise Name 5 -- (B) SAQ  -     Cueing 5 -- Verbal;Tactile;Demo  -     Reps 5 -- 25  -MH     Time 5 -- 5 sec  -MH        Exercise 6    Exercise Name 6 -- sit to stand  -     Cueing 6 -- Verbal;Tactile;Demo  -MH     Reps 6 -- 15 reps total  -     Time 6 -- elevated table x10 reps then additional 5 reps with table slightly lowered  -        Exercise 7    Exercise Name 7 -- 6\" Forward step ups  -     Cueing 7 -- Verbal  -     Reps 7 -- 15 each  -     Time 7 -- (B) hand rails  -        Exercise 8    Exercise Name 8 -- (B) SLR with stomach muscles tight  -     Cueing 8 -- Verbal;Tactile  -MH     Reps 8 -- 15  -MH     Time 8 -- 2-3 sec  -        Exercise 9    Exercise Name 9 -- (B) sidellying clams  -     Cueing 9 -- Verbal;Tactile;Demo  -     Reps 9 -- 20  -MH     Time 9 -- blue  -        Exercise 10    Exercise Name 10 -- (B) hamstring stretch  -     Cueing 10 -- Verbal;Tactile;Demo  -     Reps 10 -- 10  -MH     Time 10 -- 10 sec  -MH               User Key  (r) = Recorded By, (t) = Taken By, (c) = Cosigned By      Initials Name Provider Type    Grant Cuellar PTA Physical Therapist Assistant                             Manual Rx (last 36 hours)       Manual Treatments       Row Name 10/06/23 8037             " Manual Rx 1    Manual Rx 1 Type No MET required this session  -                User Key  (r) = Recorded By, (t) = Taken By, (c) = Cosigned By      Initials Name Provider Type     Grant Rose PTA Physical Therapist Assistant                        Therapy Education  Given: HEP, Symptoms/condition management, Mobility training  Program: New, Reinforced  How Provided: Verbal, Demonstration  Provided to: Patient  Level of Understanding: Teach back education performed, Verbalized, Demonstrated              Time Calculation:   Start Time: 0950  Stop Time: 1031  Time Calculation (min): 41 min  Timed Charges  19788 - PT Therapeutic Exercise Minutes: 40  Total Minutes  Timed Charges Total Minutes: 40   Total Minutes: 40  Therapy Charges for Today       Code Description Service Date Service Provider Modifiers Qty    27943569607 HC PT THER PROC EA 15 MIN 10/6/2023 Grant Rose PTA GP 2                      Grant Rose PTA  10/6/2023

## 2023-10-12 ENCOUNTER — HOSPITAL ENCOUNTER (OUTPATIENT)
Dept: PHYSICAL THERAPY | Facility: HOSPITAL | Age: 68
Setting detail: THERAPIES SERIES
Discharge: HOME | End: 2023-10-12
Payer: MEDICARE

## 2023-10-12 DIAGNOSIS — M54.6 THORACIC SPINE PAIN: Primary | ICD-10-CM

## 2023-10-12 DIAGNOSIS — Z98.1 S/P LAMINECTOMY WITH SPINAL FUSION: ICD-10-CM

## 2023-10-12 PROCEDURE — 97110 THERAPEUTIC EXERCISES: CPT

## 2023-10-12 NOTE — THERAPY TREATMENT NOTE
Outpatient Physical Therapy Ortho Treatment Note   Justine Trejo     Patient Name: Leann Andrew  : 1955  MRN: [id number]  Today's Date: 10/12/2023      Visit Date: 10/12/2023    Visit Dx:    ICD-10-CM ICD-9-CM   1. Thoracic spine pain  M54.6 724.1   2. S/P laminectomy with spinal fusion  Z98.1 V45.4       Patient Active Problem List   Diagnosis    RA (rheumatoid arthritis)    GERD (gastroesophageal reflux disease)    HTN (hypertension)    Hypercholesteremia    DDD (degenerative disc disease), lumbar    Screening for colon cancer    Thoracic spinal stenosis    Chronic kidney disease    Heart murmur    Back pain    Osteopenia    Anemia due to chronic illness    Bilateral cataracts    Long-term use of high-risk medication    Lumbar radiculopathy, acute    Menopause    Benign hypertension with chronic kidney disease, stage III    Degenerative scoliosis in adult patient    Idiopathic neuropathy    Thrombocytopenia        Past Medical History:   Diagnosis Date    Anemia     Back pain     NUMBNESS/ TINGLINGPAIN DOWN BILAT LOWER EXTREMITIES    Cataract     Chronic kidney disease     STAGE III - FOLLOWED ONLY BY PCP    DDD (degenerative disc disease), lumbar     Female infertility     Gout     Heart murmur     NO PROBLEMS    Hyperlipidemia     Hypertension     RA (rheumatoid arthritis)     NO PROBLEMS WITH FLEX/ EXTENSION        Past Surgical History:   Procedure Laterality Date    COLONOSCOPY N/A 2021    Procedure: COLONOSCOPY INTO CECUM WITH COLD BIOPSY POLYPECTOMIES;  Surgeon: Chuy Bronson Jr., MD;  Location: [facility] ENDOSCOPY;  Service: General;  Laterality: N/A;  PRE- SCREENING  POST- POLYPS    EPIDURAL BLOCK      FOOT SURGERY Right     RHEUMATOID BONE SPURS REMOVED, GRAFT FROM KNEE    HYSTERECTOMY      THORACIC DECOMPRESSION POSTERIOR FUSION WITH INSTRUMENTATION N/A 2023    Procedure: T11-12 LAMINECTOMY FUSION;  Surgeon: Smith Montgomery MD;  Location: [facility] MAIN OR;  Service: Orthopedic Spine;   "Laterality: N/A;        PT Ortho       Row Name 10/12/23 1200       Subjective    Subjective Comments pt states she still hasn't tried steps at home \"I'm going to give myself a couple more weeks before I try that.\"  pt reports light housework with fatigue in her back but no pain  -       Precautions and Contraindications    Precautions/Limitations spinal precautions  -    Precautions no heavy lifting (5-10# max) and no bending or twisting per pt  -       Gait/Stairs (Locomotion)    [place] Level (Stairs) verbal cues;contact guard  -    Assistive Device (Stairs) cane, straight  -    Handrail Location (Stairs) left side (descending);right side (ascending)  -    Ascending Technique (Stairs) step-to-step  -    Descending Technique (Stairs) step-to-step  -              User Key  (r) = Recorded By, (t) = Taken By, (c) = Cosigned By      Initials Name Provider Type     Grant Rose PTA Physical Therapist Assistant                                 PT Assessment/Plan       Row Name 10/12/23 1308          PT Assessment    Assessment Comments pt continues to do well in regards to back pain - reports only fatigue at this point with light housework; pt continues to demonstrate weakness with sit to stands and stair climbing but tolerating current HEP well  -        PT Plan    PT Plan Comments Trial of HEP daily and clinic appt in 2 weeks  -               User Key  (r) = Recorded By, (t) = Taken By, (c) = Cosigned By      Initials Name Provider Type     Grant Rose PTA Physical Therapist Assistant                       OP Exercises       Row Name 10/12/23 1310 10/12/23 1200          Subjective    Subjective Comments -- pt states she still hasn't tried steps at home \"I'm going to give myself a couple more weeks before I try that.\"  pt reports light housework with fatigue in her back but no pain  -        Total Minutes    57977 - PT Therapeutic Exercise Minutes 35  - --        Exercise 1    " "Exercise Name 1 -- supine GS  -MH     Cueing 1 -- Verbal;Tactile  -MH     Reps 1 -- 20  -MH     Time 1 -- 5sec  -MH        Exercise 2    Exercise Name 2 -- PPT  -MH     Cueing 2 -- Verbal;Tactile;Demo  -MH     Reps 2 -- 20  -MH     Time 2 -- 5 sec  -MH        Exercise 3    Exercise Name 3 -- PPT with ball squeeze  -MH     Cueing 3 -- Verbal;Tactile;Demo  -MH     Reps 3 -- 20  -MH     Time 3 -- 5 sec  -MH        Exercise 4    Exercise Name 4 -- PPT with hooklying clam shells vs TB  -MH     Cueing 4 -- Verbal;Tactile;Demo  -MH     Reps 4 -- 20  -MH     Time 4 -- 5 sec  -MH        Exercise 5    Exercise Name 5 -- (B) SAQ  -MH     Cueing 5 -- Verbal;Tactile;Demo  -MH     Reps 5 -- 25  -MH     Time 5 -- 5 sec  -MH        Exercise 6    Exercise Name 6 -- sit to stand  -MH     Cueing 6 -- Verbal;Tactile;Demo  -MH     Reps 6 -- 15 reps total  -MH     Time 6 -- elevated table x10 reps then additional 5 reps with table slightly lowered  -MH        Exercise 7    Exercise Name 7 -- 6\" Forward step ups  -MH     Cueing 7 -- Verbal  -MH     Reps 7 -- 15 each  -MH     Time 7 -- (B) hand rails  -MH        Exercise 8    Exercise Name 8 -- (B) SLR with stomach muscles tight  -MH     Cueing 8 -- Verbal;Tactile  -MH     Reps 8 -- 20  -MH     Time 8 -- 2-3 sec  -MH        Exercise 9    Exercise Name 9 -- (B) sidellying clams  -MH     Cueing 9 -- Verbal;Tactile;Demo  -MH     Reps 9 -- 20  -MH     Time 9 -- blue  -MH        Exercise 10    Exercise Name 10 -- (B) hamstring stretch  -MH     Cueing 10 -- Verbal;Tactile;Demo  -MH     Reps 10 -- 10  -MH     Time 10 -- 10 sec  -MH               User Key  (r) = Recorded By, (t) = Taken By, (c) = Cosigned By      Initials Name Provider Type    Grant Cuellar, PTA Physical Therapist Assistant                                     Therapy Education  Given: HEP, Symptoms/condition management, Mobility training  Program: Reinforced  How Provided: Verbal, Demonstration  Provided to: Patient  Level of " Understanding: Teach back education performed, Verbalized              Time Calculation:   Start Time: 1205  Stop Time: 1240  Time Calculation (min): 35 min  Timed Charges  16336 - PT Therapeutic Exercise Minutes: 35  Total Minutes  Timed Charges Total Minutes: 35   Total Minutes: 35  Therapy Charges for Today       Code Description Service Date Service Provider Modifiers Qty    10661330748 HC PT THER PROC EA 15 MIN 10/12/2023 Grant Rose, PTA GP 2                      Grant Rose PTA  10/12/2023

## 2023-10-25 ENCOUNTER — HOSPITAL ENCOUNTER (OUTPATIENT)
Dept: PHYSICAL THERAPY | Facility: HOSPITAL | Age: 68
Setting detail: THERAPIES SERIES
Discharge: HOME | End: 2023-10-25
Payer: MEDICARE

## 2023-10-25 DIAGNOSIS — M54.6 THORACIC SPINE PAIN: Primary | ICD-10-CM

## 2023-10-25 DIAGNOSIS — Z98.1 S/P LAMINECTOMY WITH SPINAL FUSION: ICD-10-CM

## 2023-10-25 PROCEDURE — 97110 THERAPEUTIC EXERCISES: CPT

## 2023-10-25 NOTE — THERAPY TREATMENT NOTE
Outpatient Physical Therapy Ortho Treatment Note   Justine Trejo     Patient Name: Leann Andrew  : 1955  MRN: [id number]  Today's Date: 10/25/2023      Visit Date: 10/25/2023    Visit Dx:    ICD-10-CM ICD-9-CM   1. Thoracic spine pain  M54.6 724.1   2. S/P laminectomy with spinal fusion  Z98.1 V45.4       Patient Active Problem List   Diagnosis    RA (rheumatoid arthritis)    GERD (gastroesophageal reflux disease)    HTN (hypertension)    Hypercholesteremia    DDD (degenerative disc disease), lumbar    Screening for colon cancer    Thoracic spinal stenosis    Chronic kidney disease    Heart murmur    Back pain    Osteopenia    Anemia due to chronic illness    Bilateral cataracts    Long-term use of high-risk medication    Lumbar radiculopathy, acute    Menopause    Benign hypertension with chronic kidney disease, stage III    Degenerative scoliosis in adult patient    Idiopathic neuropathy    Thrombocytopenia        Past Medical History:   Diagnosis Date    Anemia     Back pain     NUMBNESS/ TINGLINGPAIN DOWN BILAT LOWER EXTREMITIES    Cataract     Chronic kidney disease     STAGE III - FOLLOWED ONLY BY PCP    DDD (degenerative disc disease), lumbar     Female infertility     Gout     Heart murmur     NO PROBLEMS    Hyperlipidemia     Hypertension     RA (rheumatoid arthritis)     NO PROBLEMS WITH FLEX/ EXTENSION        Past Surgical History:   Procedure Laterality Date    COLONOSCOPY N/A 2021    Procedure: COLONOSCOPY INTO CECUM WITH COLD BIOPSY POLYPECTOMIES;  Surgeon: Chuy Bronson Jr., MD;  Location: [facility] ENDOSCOPY;  Service: General;  Laterality: N/A;  PRE- SCREENING  POST- POLYPS    EPIDURAL BLOCK      FOOT SURGERY Right     RHEUMATOID BONE SPURS REMOVED, GRAFT FROM KNEE    HYSTERECTOMY      THORACIC DECOMPRESSION POSTERIOR FUSION WITH INSTRUMENTATION N/A 2023    Procedure: T11-12 LAMINECTOMY FUSION;  Surgeon: Smith Montgomery MD;  Location: [facility] MAIN OR;  Service: Orthopedic Spine;   Laterality: N/A;        PT Ortho       Row Name 10/25/23 1000       Subjective    Subjective Comments pt continues to report improving tolerance to daily routine; still has not attempted stairs at home - she and  still reluctant to try; pt states that she has gotten insurance figured out so that she can return to [employer] as part of their program and is hoping to be able to return to that soon  -       Precautions and Contraindications    Precautions/Limitations spinal precautions  -    Precautions no heavy lifting (5-10# max) and no bending or twisting per pt  -       Gait/Stairs (Locomotion)    [place] Level (Stairs) verbal cues;contact guard;supervision  -    Assistive Device (Stairs) cane, straight  -    Handrail Location (Stairs) left side (descending);right side (ascending)  -    Number of Steps (Stairs) 14  -    Ascending Technique (Stairs) step-to-step  -    Descending Technique (Stairs) step-to-step  -              User Key  (r) = Recorded By, (t) = Taken By, (c) = Cosigned By      Initials Name Provider Type     Grant Rose PTA Physical Therapist Assistant                                 PT Assessment/Plan       Row Name 10/25/23 1209          PT Assessment    Assessment Comments pt maintains good pelvic alignment again this week; doing well with ex's and demonstrating increased strength with quality of ex's but still fatigues with current reps/resistance; pt continues to struggle with sit to stand without use of UE's or elevated surface; ambulating well with SPC, including flight of stairs but pt hesitant to try stairs at home  -        PT Plan    PT Plan Comments Cont with daily HEP and clinic every 2 weeks  -               User Key  (r) = Recorded By, (t) = Taken By, (c) = Cosigned By      Initials Name Provider Type     Grant Rose PTA Physical Therapist Assistant                       OP Exercises       Row Name 10/25/23 1213 10/25/23 1000        "   Subjective    Subjective Comments -- pt continues to report improving tolerance to daily routine; still has not attempted stairs at home - she and  still reluctant to try; pt states that she has gotten insurance figured out so that she can return to [employer] as part of their program and is hoping to be able to return to that soon  -        Total Minutes    32788 - PT Therapeutic Exercise Minutes 35  -MH --        Exercise 1    Exercise Name 1 -- supine GS  -     Cueing 1 -- Verbal;Tactile  -     Reps 1 -- 20  -     Time 1 -- 5sec  -        Exercise 2    Exercise Name 2 -- PPT  -     Cueing 2 -- Verbal;Tactile;Demo  -     Reps 2 -- 20  -     Time 2 -- 5 sec  -        Exercise 3    Exercise Name 3 -- PPT with ball squeeze  -     Cueing 3 -- Verbal;Tactile;Demo  -     Reps 3 -- 20  -     Time 3 -- 5 sec  -        Exercise 4    Exercise Name 4 -- PPT with hooklying clam shells vs TB  -     Cueing 4 -- Verbal;Tactile;Demo  -     Reps 4 -- 20  -     Time 4 -- 5 sec  -        Exercise 5    Exercise Name 5 -- (B) SAQ  -     Cueing 5 -- Verbal;Tactile;Demo  -     Reps 5 -- 25  -     Time 5 -- 5 sec  -        Exercise 6    Exercise Name 6 -- sit to stand  -     Cueing 6 -- Verbal;Tactile;Demo  -     Reps 6 -- 10 reps lower surface w/UE assist; 10 reps elevated surface, no UE assist  -     Time 6 -- --  -        Exercise 7    Exercise Name 7 -- 6\" Forward step ups  -     Cueing 7 -- Verbal  -     Reps 7 -- 15 each  -     Time 7 -- (B) hand rails  -        Exercise 8    Exercise Name 8 -- (B) SLR with stomach muscles tight  -     Cueing 8 -- Verbal;Tactile  -     Reps 8 -- 20  -     Time 8 -- 2-3 sec  -        Exercise 9    Exercise Name 9 -- (B) sidellying clams  -     Cueing 9 -- Verbal;Tactile;Demo  -     Reps 9 -- 20  -     Time 9 -- blue  -        Exercise 10    Exercise Name 10 -- (B) hamstring stretch  -     Cueing 10 -- " Verbal;Tactile;Demo  -MH     Reps 10 -- 10  -MH     Time 10 -- 10 sec  -               User Key  (r) = Recorded By, (t) = Taken By, (c) = Cosigned By      Initials Name Provider Type    Grant Cuellar PTA Physical Therapist Assistant                             Manual Rx (last 36 hours)       Manual Treatments       Row Name 10/25/23 1000             Manual Rx 1    Manual Rx 1 Type No MET required this session  -                User Key  (r) = Recorded By, (t) = Taken By, (c) = Cosigned By      Initials Name Provider Type    Grant Cuellar PTA Physical Therapist Assistant                        Therapy Education  Given: HEP, Symptoms/condition management  Program: Reinforced  How Provided: Verbal, Demonstration  Provided to: Patient  Level of Understanding: Teach back education performed, Verbalized, Demonstrated              Time Calculation:   Start Time: 0956  Stop Time: 1032  Time Calculation (min): 36 min  Timed Charges  48914 - PT Therapeutic Exercise Minutes: 35  Total Minutes  Timed Charges Total Minutes: 35   Total Minutes: 35  Therapy Charges for Today       Code Description Service Date Service Provider Modifiers Qty    15304689154 HC PT THER PROC EA 15 MIN 10/25/2023 Grant Rose PTA GP 2                      Grant Rose PTA  10/25/2023

## 2023-11-08 ENCOUNTER — HOSPITAL ENCOUNTER (OUTPATIENT)
Dept: PHYSICAL THERAPY | Facility: HOSPITAL | Age: 68
Setting detail: THERAPIES SERIES
Discharge: HOME | End: 2023-11-08
Payer: MEDICARE

## 2023-11-08 DIAGNOSIS — Z98.1 S/P LAMINECTOMY WITH SPINAL FUSION: ICD-10-CM

## 2023-11-08 DIAGNOSIS — M54.6 THORACIC SPINE PAIN: Primary | ICD-10-CM

## 2023-11-08 PROCEDURE — 97110 THERAPEUTIC EXERCISES: CPT

## 2023-11-08 NOTE — THERAPY RE-EVALUATION
Outpatient Physical Therapy Ortho Re-Evaluation   Justine Trejo     Patient Name: Leann Andrew  : 1955  MRN: [id number]  Today's Date: 2023      Visit Date: 2023    Patient Active Problem List   Diagnosis    RA (rheumatoid arthritis)    GERD (gastroesophageal reflux disease)    HTN (hypertension)    Hypercholesteremia    DDD (degenerative disc disease), lumbar    Screening for colon cancer    Thoracic spinal stenosis    Chronic kidney disease    Heart murmur    Back pain    Osteopenia    Anemia due to chronic illness    Bilateral cataracts    Long-term use of high-risk medication    Lumbar radiculopathy, acute    Menopause    Benign hypertension with chronic kidney disease, stage III    Degenerative scoliosis in adult patient    Idiopathic neuropathy    Thrombocytopenia        Past Medical History:   Diagnosis Date    Anemia     Back pain     NUMBNESS/ TINGLINGPAIN DOWN BILAT LOWER EXTREMITIES    Cataract     Chronic kidney disease     STAGE III - FOLLOWED ONLY BY PCP    DDD (degenerative disc disease), lumbar     Female infertility     Gout     Heart murmur     NO PROBLEMS    Hyperlipidemia     Hypertension     RA (rheumatoid arthritis)     NO PROBLEMS WITH FLEX/ EXTENSION        Past Surgical History:   Procedure Laterality Date    COLONOSCOPY N/A 2021    Procedure: COLONOSCOPY INTO CECUM WITH COLD BIOPSY POLYPECTOMIES;  Surgeon: Chuy Bronson Jr., MD;  Location: [facility] ENDOSCOPY;  Service: General;  Laterality: N/A;  PRE- SCREENING  POST- POLYPS    EPIDURAL BLOCK      FOOT SURGERY Right     RHEUMATOID BONE SPURS REMOVED, GRAFT FROM KNEE    HYSTERECTOMY      THORACIC DECOMPRESSION POSTERIOR FUSION WITH INSTRUMENTATION N/A 2023    Procedure: T11-12 LAMINECTOMY FUSION;  Surgeon: Smith Montgomery MD;  Location: [facility] MAIN OR;  Service: Orthopedic Spine;  Laterality: N/A;       Visit Dx:     ICD-10-CM ICD-9-CM   1. Thoracic spine pain  M54.6 724.1   2. S/P laminectomy with spinal  "fusion  Z98.1 V45.4              PT Ortho       Row Name 11/08/23 0900       Subjective    Subjective Comments Pt reports no pain except for the nerve pain in her legs; \"which I think is going to be with me awhile.\" \"I still use the walker sometimes at home when my legs get tired but otherwise I use the cane.\" \"I seem to be doing okay with the stairs at home with my cane.\" \"Once I finish here, I am going to go back to "[employer].\" \"When I am on my feet a lot during the day; my legs get tired, terri my left leg and it feels like it wants to give out on me and that is when I go back to the walker.\"  -LN       Precautions and Contraindications    Precautions/Limitations spinal precautions  -LN    Precautions no heavy lifting; MD told her to use \"common sense.\"  -LN       Subjective Pain    Able to rate subjective pain? yes  -LN    Pre-Treatment Pain Level 0  -LN    Post-Treatment Pain Level 0  -LN       Posture/Observations    Posture/Observations Comments well healed incision lower thoracic spine  -LN       Lumbosacral Palpation    Lumbosacral Palpation Comments No tenderness palpated in back.  -LN       Leg Length Test    True Equal  -LN    Apparent Equal  no sign of any pelvic rotation  -LN       Head/Neck/Trunk    Trunk Extension AROM WFL- painfree  -LN    Trunk Flexion AROM WFL- painfree  -LN    Trunk Lt Lateral Flexion AROM 75%- painfree  -LN    Trunk Rt Lateral Flexion AROM 75%-painfree  -LN    Trunk Lt Rotation AROM WFL-painfree  -LN    Trunk Rt Rotation AROM WFL-painfree  -LN       MMT Neck/Trunk    Trunk Flexion MMT, Gross Movement (4/5) good  -LN    Trunk Extension MMT, Gross Movement (4-/5) good minus  -LN       MMT Right Lower Ext    Rt Hip Flexion MMT, Gross Movement (4/5) good  -LN    Rt Hip Extension MMT, Gross Movement (4+/5) good plus  -LN    Rt Hip ABduction MMT, Gross Movement (4+/5) good plus  -LN    Rt Hip ADduction MMT, Gross Movement (4+/5) good plus  -LN    Rt Knee Extension MMT, Gross " "Movement (4+/5) good plus  VMO 4/5  -LN    Rt Knee Flexion MMT, Gross Movement (4+/5) good plus  -LN    Rt Ankle Plantarflexion MMT, Gross Movement (5/5) normal  -LN    Rt Ankle Dorsiflexion MMT, Gross Movement (5/5) normal  -LN       MMT Left Lower Ext    Lt Hip Flexion MMT, Gross Movement (4/5) good  -LN    Lt Hip Extension MMT, Gross Movement (4+/5) good plus  -LN    Lt Hip ABduction MMT, Gross Movement (4+/5) good plus  -LN    Lt Hip ADduction MMT, Gross Movement (4/5) good  -LN    Lt Knee Extension MMT, Gross Movement (4+/5) good plus  VMO 4/5  -LN    Lt Knee Flexion MMT, Gross Movement (4+/5) good plus  -LN    Lt Ankle Plantarflexion MMT, Gross Movement (5/5) normal  -LN    Lt Ankle Dorsiflexion MMT, Gross Movement (5/5) normal  -LN       Sensation    Light Touch No apparent deficits  -LN    Additional Comments Pt c/o B leg/thigh nerve pain at night and at times during the day. \"pins and needles\"  -LN       Lower Extremity Flexibility    Hamstrings Bilateral:;WNL  -LN    ITB Bilateral:;Mildly limited  -LN    Hip External Rotators Bilateral:;Mildly limited  -LN    Gastrocnemius Bilateral:;WNL  -LN       Transfers    Sit-Stand [place] (Transfers) independent  -LN    Stand-Sit [place] (Transfers) independent  -LN    Transfers, Sit-Stand-Sit, Assist Device straight cane  -LN       Gait/Stairs (Locomotion)    [place] Level (Gait) independent  -LN    Assistive Device (Gait) cane, straight  -LN    [place] Level (Stairs) stand by assist;supervision;verbal cues  -LN    Assistive Device (Stairs) cane, straight  -LN    Handrail Location (Stairs) left side (descending);right side (ascending)  -LN    Number of Steps (Stairs) 14  -LN    Ascending Technique (Stairs) step-to-step  -LN    Descending Technique (Stairs) step-to-step  -LN    Comment, (Gait/Stairs) Pt ambulates into PT clinic with SPC with slow gait speed but overall improved and no antalgic gait noted.  -LN              User Key  (r) = " Recorded By, (t) = Taken By, (c) = Cosigned By      Initials Name Provider Type    Angie Borrego, PT Physical Therapist                                Therapy Education  Education Details: Pt to add PPT with march and TKE vs TB to HEP as tolerated. Pt to continue to work on gait with SPC, including her stairs.  Given: HEP, Symptoms/condition management, Mobility training  Program: New, Reinforced (added PPT with march and TKE vs TB)  How Provided: Verbal, Demonstration, Written  Provided to: Patient  Level of Understanding: Teach back education performed, Verbalized, Demonstrated      PT OP Goals       Row Name 11/08/23 1000          PT Short Term Goals    STG Date to Achieve --  all STG met  -LN     STG 1 Pt independent with initial HEP issued by therapist.  -LN     STG 1 Progress Met  -LN     STG 2 Pt able to perform 10-15 reps core stabilization (neutral) exercises without any c/o back pain.  -LN     STG 2 Progress Met  -LN     STG 3 Pt able to maintain good pelvic alignment between PT sessions with use of MET and core stabilization program.  -LN     STG 3 Progress Met  -LN     STG 4 B LE strength improved by 1/2 muscle grade.  -LN     STG 4 Progress Met  -LN     STG 5 Pt able to ambulate all home and short community distances with SPC  with good gait speed noted and only nominal antalgic gait. (including up and down her driveway).  -LN     STG 5 Progress Met  -LN        Long Term Goals    LTG Date to Achieve 12/20/23  -LN (6 weeks)     LTG 1 Pt independent with more advanced HEP issued by therapist.  -LN     LTG 1 Progress Ongoing;Progressing  -LN     LTG 2 Trunk ROM WFL and painfree all planes.  -LN     LTG 2 Progress Partially Met;Ongoing;Progressing  -LN     LTG 2 Progress Comments WFL except B trunk lateral flexion 75% B; all planes are painfree.  -LN     LTG 3 Core strength improved to 4-4+/5 when tested.  -LN     LTG 3 Progress Partially Met;Ongoing;Progressing  -LN     LTG 3 Progress Comments  met for trunk flexion  -LN     LTG 4 B LE strength improved to 5/5.  -LN     LTG 4 Progress Ongoing;Progressing  -LN     LTG 5 Pt able to ambulate home & short community distances without any AD and only nominal antalgic gait noted. (including up and down her driveway)  -LN     LTG 5 Progress Ongoing;Progressing  -LN     LTG 5 Progress Comments Pt is now ambulating with SPC with no antalgic gait noted.  -LN     LTG 6 Pt able to go up and down her front porch steps with 1 railing independently and no c/o left leg wanting to give out.  -LN     LTG 6 Progress Met  -LN     LTG 7 Pt to have improved standing tolerance to 30 minutes, so she can stand and cook a meal in her kitchen without any c/o increased back pain.  -LN     LTG 7 Progress Ongoing;Progressing  -LN     LTG 7 Progress Comments Pt reports improved standing tolerance to 10-15 minutes. Pt reports still very fatigued after cooking a meal and cleaning up.  -LN        Time Calculation    PT Goal Re-Cert Due Date 12/20/23  -               User Key  (r) = Recorded By, (t) = Taken By, (c) = Cosigned By      Initials Name Provider Type    Angie Borrego, PT Physical Therapist                     PT Assessment/Plan       Row Name 11/08/23 1000          PT Assessment    Assessment Comments Pt continues to be painfree except for B thigh nerve pain that comes and goes and is worse at night but medicine is helping this (Cymbalta at night and tylenol during day). Pt continues to maintain good pelvic alignment. She is doing well with her HEP and beginning to do her stairs at home with cane and 1 railing and reports doing good with them. Trunk ROM is 75%-WFL and painfree all planes. Strength improved in B LE but still has weakness in B hip flexion and L Qu[place]/VMO/hip adductor.  Core strength improved but still decreased. Pt is ambulating well with SPC with no antalgic gait noted. She did well with stairs with SPC and 1 railing with SBA-supervision and step to  "step technique. Pt has met all STG and has met 1 LTG and partially met 2 LTG. She is making good progess towards all remaining LTG.  She will benefit from continued PT to progress with strengthening exercises and continued gait training to work on weaning pt off her cane and to progress towards discharging pt with HEP and tranisitioning to exercising at [employer].  -LN        PT Plan    PT Frequency Other (comment)  -LN     Predicted Duration of Therapy Intervention (PT) every 2 weeks for 6 weeks  -LN     PT Plan Comments Cont with daily HEP and clinic every 2 weeks per POC. Progress exercises as tolerated. MET PRN. Pt education and gait training.  -LN               User Key  (r) = Recorded By, (t) = Taken By, (c) = Cosigned By      Initials Name Provider Type    Angie Borrego, PT Physical Therapist                     Modalities       Row Name 11/08/23 0900             Moist Heat    Patient denies application of MH Yes  -LN                User Key  (r) = Recorded By, (t) = Taken By, (c) = Cosigned By      Initials Name Provider Type    Angie Borrego, PT Physical Therapist                   OP Exercises       Row Name 11/08/23 1000 11/08/23 0900          Precautions    Existing Precautions/Restrictions -- spinal  -LN        Subjective    Subjective Comments -- Pt reports no pain except for the nerve pain in her legs; \"which I think is going to be with me awhile.\" \"I still use the walker sometimes at home when my legs get tired but otherwise I use the cane.\" \"I seem to be doing okay with the stairs at home with my cane.\" \"Once I finish here, I am going to go back to [employer].\" \"When I am on my feet a lot during the day; my legs get tired, terri my left leg and it feels like it wants to give out on me and that is when I go back to the walker.\"  -LN        Subjective Pain    Able to rate subjective pain? -- yes  -LN     Pre-Treatment Pain Level -- 0  -LN     Post-Treatment Pain Level " "-- 0  -LN        Total Minutes    62307 - PT Therapeutic Exercise Minutes 45  -LN --        Exercise 1    Exercise Name 1 supine GS  -LN --     Cueing 1 Verbal;Tactile  -LN --     Reps 1 20  -LN --     Time 1 5sec  -LN --        Exercise 2    Exercise Name 2 PPT  -LN --     Cueing 2 Verbal;Tactile;Demo  -LN --     Reps 2 20  -LN --     Time 2 5 sec  -LN --        Exercise 3    Exercise Name 3 PPT with ball squeeze  -LN --     Cueing 3 Verbal;Tactile;Demo  -LN --     Reps 3 20  -LN --     Time 3 5 sec  -LN --        Exercise 4    Exercise Name 4 PPT with hooklying clam shells vs TB  -LN --     Cueing 4 Verbal;Tactile;Demo  -LN --     Reps 4 20  -LN --     Time 4 5 sec  -LN --     Additional Comments blue  -LN --        Exercise 5    Exercise Name 5 (B) SAQ  -LN --     Cueing 5 Verbal;Tactile;Demo  -LN --     Reps 5 25  -LN --     Time 5 5 sec  -LN --        Exercise 6    Exercise Name 6 sit to stand  -LN --     Cueing 6 Verbal;Tactile;Demo  -LN --     Reps 6 10 reps lower surface w/UE assist; 10 reps elevated surface, no UE assist  -LN --        Exercise 7    Exercise Name 7 6\" Forward step ups  -LN --     Cueing 7 Verbal  -LN --     Reps 7 15 each  -LN --     Time 7 (B) hand rails  -LN --        Exercise 8    Exercise Name 8 (B) SLR with stomach muscles tight  -LN --     Cueing 8 Verbal;Tactile  -LN --     Reps 8 20  -LN --     Time 8 2-3 sec  -LN --        Exercise 9    Exercise Name 9 (B) sidelying clams  -LN --     Cueing 9 Verbal;Tactile;Demo  -LN --     Reps 9 20 each  -LN --     Time 9 blue  -LN --        Exercise 10    Exercise Name 10 (B) hamstring stretch  -LN --     Cueing 10 Verbal;Tactile;Demo  -LN --     Reps 10 10  -LN --     Time 10 10 sec  -LN --        Exercise 11    Exercise Name 11 PPT with march  -LN --     Cueing 11 Verbal;Tactile;Demo  -LN --     Reps 11 10  -LN --        Exercise 12    Exercise Name 12 TKE vs TB  -LN --     Cueing 12 Verbal;Tactile;Demo  -LN --     Reps 12 15 each  -LN --     " Additional Comments blue  -LN --               User Key  (r) = Recorded By, (t) = Taken By, (c) = Cosigned By      Initials Name Provider Type    Angie Borrego, PT Physical Therapist                  Manual Rx (last 36 hours)       Manual Treatments       Row Name 11/08/23 0900             Manual Rx 1    Manual Rx 1 Type No MET required this session due to having good pelvic alignment.  -LN                User Key  (r) = Recorded By, (t) = Taken By, (c) = Cosigned By      Initials Name Provider Type    Angie Borrego, PT Physical Therapist                                          Time Calculation:     Start Time: 0956  Stop Time: 1045  Time Calculation (min): 49 min  Timed Charges  37702 - PT Therapeutic Exercise Minutes: 45  Total Minutes  Timed Charges Total Minutes: 45   Total Minutes: 45     Therapy Charges for Today       Code Description Service Date Service Provider Modifiers Qty    [id number]  PT THER PROC EA 15 MIN 11/8/2023 Angie Carter, PT GP 3                      Angie Carter PT  11/8/2023

## 2023-11-20 ENCOUNTER — HOSPITAL ENCOUNTER (OUTPATIENT)
Dept: PHYSICAL THERAPY | Facility: HOSPITAL | Age: 68
Setting detail: THERAPIES SERIES
Discharge: HOME | End: 2023-11-20
Payer: MEDICARE

## 2023-11-20 DIAGNOSIS — M54.6 THORACIC SPINE PAIN: Primary | ICD-10-CM

## 2023-11-20 PROCEDURE — 97110 THERAPEUTIC EXERCISES: CPT

## 2023-11-20 NOTE — THERAPY TREATMENT NOTE
Outpatient Physical Therapy Ortho Treatment Note   Justine Trejo     Patient Name: Leann Andrew  : 1955  MRN: [id number]  Today's Date: 2023      Visit Date: 2023    Visit Dx:    ICD-10-CM ICD-9-CM   1. Thoracic spine pain  M54.6 724.1       Patient Active Problem List   Diagnosis    RA (rheumatoid arthritis)    GERD (gastroesophageal reflux disease)    HTN (hypertension)    Hypercholesteremia    DDD (degenerative disc disease), lumbar    Screening for colon cancer    Thoracic spinal stenosis    Chronic kidney disease    Heart murmur    Back pain    Osteopenia    Anemia due to chronic illness    Bilateral cataracts    Long-term use of high-risk medication    Lumbar radiculopathy, acute    Menopause    Benign hypertension with chronic kidney disease, stage III    Degenerative scoliosis in adult patient    Idiopathic neuropathy    Thrombocytopenia        Past Medical History:   Diagnosis Date    Anemia     Back pain     NUMBNESS/ TINGLINGPAIN DOWN BILAT LOWER EXTREMITIES    Cataract     Chronic kidney disease     STAGE III - FOLLOWED ONLY BY PCP    DDD (degenerative disc disease), lumbar     Female infertility     Gout     Heart murmur     NO PROBLEMS    Hyperlipidemia     Hypertension     RA (rheumatoid arthritis)     NO PROBLEMS WITH FLEX/ EXTENSION        Past Surgical History:   Procedure Laterality Date    COLONOSCOPY N/A 2021    Procedure: COLONOSCOPY INTO CECUM WITH COLD BIOPSY POLYPECTOMIES;  Surgeon: Chuy Bronson Jr., MD;  Location: [facility] ENDOSCOPY;  Service: General;  Laterality: N/A;  PRE- SCREENING  POST- POLYPS    EPIDURAL BLOCK      FOOT SURGERY Right     RHEUMATOID BONE SPURS REMOVED, GRAFT FROM KNEE    HYSTERECTOMY      THORACIC DECOMPRESSION POSTERIOR FUSION WITH INSTRUMENTATION N/A 2023    Procedure: T11-12 LAMINECTOMY FUSION;  Surgeon: Smith Montgomery MD;  Location: [facility] MAIN OR;  Service: Orthopedic Spine;  Laterality: N/A;        PT Ortho       Row Name  "11/20/23 1000       Subjective    Subjective Comments pt states she is doing well with ex's; TKE is awkward but getting better the more she does it; pt  states her symptoms vary daily and with no apparent cause - biggest complaint is (B)LE fatigue - \"my legs just feel like they dont want to work.\"  -       Precautions and Contraindications    Precautions/Limitations spinal precautions  -    Precautions no heavy lifting; MD told her to use \"common sense.\"  -       Gait/Stairs (Locomotion)    [place] Level (Stairs) stand by assist;supervision;verbal cues  -    Assistive Device (Stairs) cane, straight  -    Handrail Location (Stairs) left side (descending);right side (ascending)  -    Number of Steps (Stairs) 14  -    Ascending Technique (Stairs) step-to-step  -    Descending Technique (Stairs) step-to-step  -    Comment, (Gait/Stairs) cues for cane placement  -              User Key  (r) = Recorded By, (t) = Taken By, (c) = Cosigned By      Initials Name Provider Type     Grant Rose PTA Physical Therapist Assistant                                 PT Assessment/Plan       Row Name 11/20/23 1044          PT Assessment    Assessment Comments pt continues to show good tolerance to exercise progression; continues to have decreased sit to/from stand strength when not using UE's for assist  -        PT Plan    PT Plan Comments Cont daily HEP an every 2 weeks clinic; pt to progress HEP as discussed  -               User Key  (r) = Recorded By, (t) = Taken By, (c) = Cosigned By      Initials Name Provider Type     Grant Rose PTA Physical Therapist Assistant                       OP Exercises       Row Name 11/20/23 1045 11/20/23 1000          Subjective    Subjective Comments -- pt states she is doing well with ex's; TKE is awkward but getting better the more she does it; pt  states her symptoms vary daily and with no apparent cause - biggest complaint is (B)LE fatigue - \"my legs " "just feel like they dont want to work.\"  -MH        Total Minutes    57355 - PT Therapeutic Exercise Minutes 45  -MH --        Exercise 1    Exercise Name 1 -- supine GS  -     Cueing 1 -- Verbal;Tactile  -     Reps 1 -- 20  -MH     Time 1 -- 5sec  -        Exercise 2    Exercise Name 2 -- PPT  -     Cueing 2 -- Verbal;Tactile;Demo  -     Reps 2 -- 20  -MH     Time 2 -- 5 sec  -        Exercise 3    Exercise Name 3 -- PPT with ball squeeze  -     Cueing 3 -- Verbal;Tactile;Demo  -     Reps 3 -- 20  -MH     Time 3 -- 5 sec  -        Exercise 4    Exercise Name 4 -- PPT with hooklying clam shells vs TB  -     Cueing 4 -- Verbal;Tactile;Demo  -     Reps 4 -- 20  -MH     Time 4 -- 5 sec  -MH     Additional Comments -- blue  -        Exercise 5    Exercise Name 5 -- (B) SAQ  -     Cueing 5 -- Verbal;Tactile;Demo  -     Reps 5 -- 25  -MH     Time 5 -- 5 sec  -     Additional Comments -- 1#  -        Exercise 6    Exercise Name 6 -- sit to stand  -     Cueing 6 -- Verbal;Tactile;Demo  -     Reps 6 -- 10 reps  - 5x lower surface with and without UE assist; 10 reps elevated surface, no UE assist  -        Exercise 7    Exercise Name 7 -- 6\" Forward step ups  -     Cueing 7 -- Verbal  -     Reps 7 -- 15 each  -     Time 7 -- (B) hand rails  -        Exercise 8    Exercise Name 8 -- (B) SLR w/abdominal bracing  -     Cueing 8 -- Verbal;Tactile  -     Reps 8 -- 20  -     Time 8 -- 2-3 sec  -        Exercise 9    Exercise Name 9 -- (B) sidelying clams  -     Cueing 9 -- Verbal;Tactile;Demo  -     Reps 9 -- 20 each  -     Time 9 -- blue  -        Exercise 10    Exercise Name 10 -- (B) hamstring stretch  -     Cueing 10 -- Verbal;Tactile;Demo  -     Reps 10 -- 10  -     Time 10 -- 10 sec  -        Exercise 11    Exercise Name 11 -- PPT with march  -     Cueing 11 -- Verbal;Tactile;Demo  -     Reps 11 -- 15  -        Exercise 12    Exercise Name 12 -- (B) " TKE vs TB  -MH     Cueing 12 -- Verbal;Tactile;Demo  -MH     Reps 12 -- 20  -     Time 12 -- 5 sec  -     Additional Comments -- blue  -               User Key  (r) = Recorded By, (t) = Taken By, (c) = Cosigned By      Initials Name Provider Type     Grant Rose PTA Physical Therapist Assistant                                     Therapy Education  Education Details: discussion of HEP progression of reps/resistance  Given: HEP, Symptoms/condition management  Program: Reinforced  How Provided: Verbal, Demonstration  Provided to: Patient  Level of Understanding: Teach back education performed, Verbalized, Demonstrated              Time Calculation:   Start Time: 0949  Stop Time: 1037  Time Calculation (min): 48 min  Timed Charges  21315 - PT Therapeutic Exercise Minutes: 45  Total Minutes  Timed Charges Total Minutes: 45   Total Minutes: 45  Therapy Charges for Today       Code Description Service Date Service Provider Modifiers Qty    64982899757 HC PT THER PROC EA 15 MIN 11/20/2023 Grant Rose PTA GP 3                      Grant Rose PTA  11/20/2023

## 2023-12-05 ENCOUNTER — HOSPITAL ENCOUNTER (OUTPATIENT)
Dept: PHYSICAL THERAPY | Facility: HOSPITAL | Age: 68
Setting detail: THERAPIES SERIES
Discharge: HOME | End: 2023-12-05
Payer: MEDICARE

## 2023-12-05 DIAGNOSIS — M54.6 THORACIC SPINE PAIN: Primary | ICD-10-CM

## 2023-12-05 PROCEDURE — 97110 THERAPEUTIC EXERCISES: CPT

## 2023-12-05 NOTE — THERAPY TREATMENT NOTE
Outpatient Physical Therapy Ortho Treatment Note   Justine Trejo     Patient Name: Leann Andrew  : 1955  MRN: [id number]  Today's Date: 2023      Visit Date: 2023    Visit Dx:    ICD-10-CM ICD-9-CM   1. Thoracic spine pain  M54.6 724.1       Patient Active Problem List   Diagnosis    RA (rheumatoid arthritis)    GERD (gastroesophageal reflux disease)    HTN (hypertension)    Hypercholesteremia    DDD (degenerative disc disease), lumbar    Screening for colon cancer    Thoracic spinal stenosis    Chronic kidney disease    Heart murmur    Back pain    Osteopenia    Anemia due to chronic illness    Bilateral cataracts    Long-term use of high-risk medication    Lumbar radiculopathy, acute    Menopause    Benign hypertension with chronic kidney disease, stage III    Degenerative scoliosis in adult patient    Idiopathic neuropathy    Thrombocytopenia        Past Medical History:   Diagnosis Date    Anemia     Back pain     NUMBNESS/ TINGLINGPAIN DOWN BILAT LOWER EXTREMITIES    Cataract     Chronic kidney disease     STAGE III - FOLLOWED ONLY BY PCP    DDD (degenerative disc disease), lumbar     Female infertility     Gout     Heart murmur     NO PROBLEMS    Hyperlipidemia     Hypertension     RA (rheumatoid arthritis)     NO PROBLEMS WITH FLEX/ EXTENSION        Past Surgical History:   Procedure Laterality Date    COLONOSCOPY N/A 2021    Procedure: COLONOSCOPY INTO CECUM WITH COLD BIOPSY POLYPECTOMIES;  Surgeon: Chuy Bronson Jr., MD;  Location: [facility] ENDOSCOPY;  Service: General;  Laterality: N/A;  PRE- SCREENING  POST- POLYPS    EPIDURAL BLOCK      FOOT SURGERY Right     RHEUMATOID BONE SPURS REMOVED, GRAFT FROM KNEE    HYSTERECTOMY      THORACIC DECOMPRESSION POSTERIOR FUSION WITH INSTRUMENTATION N/A 2023    Procedure: T11-12 LAMINECTOMY FUSION;  Surgeon: Smith Montgomery MD;  Location: [facility] MAIN OR;  Service: Orthopedic Spine;  Laterality: N/A;        PT Ortho       Row Name  "12/05/23 1000       Subjective    Subjective Comments pt reports increased stiffness in general today - feels it may be due to the rainy weather; states she has been doing steps at home and tolerating well  -       Precautions and Contraindications    Precautions/Limitations spinal precautions  -    Precautions no heavy lifting; MD told her to use \"common sense.\"  -              User Key  (r) = Recorded By, (t) = Taken By, (c) = Cosigned By      Initials Name Provider Type     Milton Grantdivya Billings PTA Physical Therapist Assistant                                 PT Assessment/Plan       Row Name 12/05/23 1040          PT Assessment    Assessment Comments pt continues to progress with tolerance to functional mobility but continues to experience overall decreased LE strength; increased tolerance and (I) with stairclimbing  -        PT Plan    PT Plan Comments Cont every 2 weeks per POC, daily HEP  -               User Key  (r) = Recorded By, (t) = Taken By, (c) = Cosigned By      Initials Name Provider Type    [place] Rose Grantdivya Billings PTA Physical Therapist Assistant                       OP Exercises       Row Name 12/05/23 1042 12/05/23 1000          Subjective    Subjective Comments -- pt reports increased stiffness in general today - feels it may be due to the rainy weather; states she has been doing steps at home and tolerating well  -        Total Minutes    27599 - PT Therapeutic Exercise Minutes 40  -MH --        Exercise 1    Exercise Name 1 -- supine GS  -     Cueing 1 -- Verbal;Tactile  -MH     Reps 1 -- 20  -MH     Time 1 -- 5sec  -MH        Exercise 2    Exercise Name 2 -- PPT  -MH     Cueing 2 -- Verbal;Tactile;Demo  -MH     Reps 2 -- 20  -MH     Time 2 -- 5 sec  -MH        Exercise 3    Exercise Name 3 -- PPT with ball squeeze  -MH     Cueing 3 -- Verbal;Tactile;Demo  -MH     Reps 3 -- 20  -MH     Time 3 -- 5 sec  -MH        Exercise 4    Exercise Name 4 -- PPT with hooklying clam shells vs TB  " "-     Cueing 4 -- Verbal;Tactile;Demo  -     Reps 4 -- 20  -MH     Time 4 -- 5 sec  -MH     Additional Comments -- blue  -        Exercise 5    Exercise Name 5 -- (B) SAQ  -     Cueing 5 -- Verbal;Tactile;Demo  -     Reps 5 -- 25  -MH     Time 5 -- 5 sec  -MH     Additional Comments -- 1#  -        Exercise 6    Exercise Name 6 -- sit to stand  -     Cueing 6 -- Verbal;Tactile;Demo  -     Reps 6 -- All reps with push up from table: 3 x 5 reps each with each rep at a lower level  -        Exercise 7    Exercise Name 7 -- 6\" Forward step ups  -     Cueing 7 -- Verbal  -     Reps 7 -- 20 each  -     Time 7 -- (B) hand rails  -        Exercise 8    Exercise Name 8 -- (B) SLR w/abdominal bracing  -     Cueing 8 -- Verbal;Tactile  -     Reps 8 -- 20  -     Time 8 -- 2-3 sec  -        Exercise 9    Exercise Name 9 -- (B) sidelying clams  -     Cueing 9 -- Verbal;Tactile;Demo  -     Reps 9 -- 20 each  -     Time 9 -- blue  -        Exercise 10    Exercise Name 10 -- (B) hamstring stretch  -     Cueing 10 -- Verbal;Tactile;Demo  -     Reps 10 -- 10  -     Time 10 -- 10 sec  -        Exercise 11    Exercise Name 11 -- PPT with march  -     Cueing 11 -- Verbal;Tactile;Demo  -     Reps 11 -- 20 each  -        Exercise 12    Exercise Name 12 -- (B) TKE vs TB  -     Cueing 12 -- Verbal;Tactile;Demo  -     Reps 12 -- 20  -     Time 12 -- 5 sec  -     Additional Comments -- blue  -        Exercise 13    Exercise Name 13 -- seated alt march w/abd bracing  -     Cueing 13 -- Verbal;Tactile;Demo  -     Reps 13 -- 10 each  -     Time 13 -- blue  -        Exercise 14    Exercise Name 14 -- partial wall slides  -     Cueing 14 -- Verbal;Demo  -     Reps 14 -- 10  -MH               User Key  (r) = Recorded By, (t) = Taken By, (c) = Cosigned By      Initials Name Provider Type    Grant Cuellar, PTA Physical Therapist Assistant                           "           Therapy Education  Given: HEP, Symptoms/condition management  Program: Reinforced  How Provided: Verbal, Demonstration  Provided to: Patient  Level of Understanding: Teach back education performed, Verbalized, Demonstrated              Time Calculation:   Start Time: 0950  Stop Time: 1030  Time Calculation (min): 40 min  Timed Charges  71494 - PT Therapeutic Exercise Minutes: 40  Total Minutes  Timed Charges Total Minutes: 40   Total Minutes: 40  Therapy Charges for Today       Code Description Service Date Service Provider Modifiers Qty    79396488737 HC PT THER PROC EA 15 MIN 12/5/2023 Grant Rose, MYRTLE GP 3                      Grant Rose PTA  12/5/2023

## 2023-12-19 ENCOUNTER — HOSPITAL ENCOUNTER (OUTPATIENT)
Dept: PHYSICAL THERAPY | Facility: HOSPITAL | Age: 68
Setting detail: THERAPIES SERIES
Discharge: HOME | End: 2023-12-19
Payer: MEDICARE

## 2023-12-19 DIAGNOSIS — M54.6 THORACIC SPINE PAIN: Primary | ICD-10-CM

## 2023-12-19 DIAGNOSIS — Z98.1 S/P LAMINECTOMY WITH SPINAL FUSION: ICD-10-CM

## 2023-12-19 PROCEDURE — 97110 THERAPEUTIC EXERCISES: CPT

## 2023-12-19 NOTE — THERAPY TREATMENT NOTE
Outpatient Physical Therapy Ortho Treatment Note   Justine Trejo     Patient Name: Leann Andrew  : 1955  MRN: [id number]  Today's Date: 2023      Visit Date: 2023    Visit Dx:    ICD-10-CM ICD-9-CM   1. Thoracic spine pain  M54.6 724.1   2. S/P laminectomy with spinal fusion  Z98.1 V45.4       Patient Active Problem List   Diagnosis    RA (rheumatoid arthritis)    GERD (gastroesophageal reflux disease)    HTN (hypertension)    Hypercholesteremia    DDD (degenerative disc disease), lumbar    Screening for colon cancer    Thoracic spinal stenosis    Chronic kidney disease    Heart murmur    Back pain    Osteopenia    Anemia due to chronic illness    Bilateral cataracts    Long-term use of high-risk medication    Lumbar radiculopathy, acute    Menopause    Benign hypertension with chronic kidney disease, stage III    Degenerative scoliosis in adult patient    Idiopathic neuropathy    Thrombocytopenia        Past Medical History:   Diagnosis Date    Anemia     Back pain     NUMBNESS/ TINGLINGPAIN DOWN BILAT LOWER EXTREMITIES    Cataract     Chronic kidney disease     STAGE III - FOLLOWED ONLY BY PCP    DDD (degenerative disc disease), lumbar     Female infertility     Gout     Heart murmur     NO PROBLEMS    Hyperlipidemia     Hypertension     RA (rheumatoid arthritis)     NO PROBLEMS WITH FLEX/ EXTENSION        Past Surgical History:   Procedure Laterality Date    COLONOSCOPY N/A 2021    Procedure: COLONOSCOPY INTO CECUM WITH COLD BIOPSY POLYPECTOMIES;  Surgeon: Chuy Bronson Jr., MD;  Location: [facility] ENDOSCOPY;  Service: General;  Laterality: N/A;  PRE- SCREENING  POST- POLYPS    EPIDURAL BLOCK      FOOT SURGERY Right     RHEUMATOID BONE SPURS REMOVED, GRAFT FROM KNEE    HYSTERECTOMY      THORACIC DECOMPRESSION POSTERIOR FUSION WITH INSTRUMENTATION N/A 2023    Procedure: T11-12 LAMINECTOMY FUSION;  Surgeon: Smith Montgomery MD;  Location: [facility] MAIN OR;  Service: Orthopedic Spine;   "Laterality: N/A;        PT Ortho       Row Name 12/19/23 1000       Subjective    Subjective Comments pt states she has had a rough couple of weeks with her  and mother being in the hospital - tolerated all the walking well overall; had been doing the steps at home and even went up/down steps at the hospital without her  present  -       Precautions and Contraindications    Precautions/Limitations spinal precautions  -    Precautions no heavy lifting; MD told her to use \"common sense.\"  -              User Key  (r) = Recorded By, (t) = Taken By, (c) = Cosigned By      Initials Name Provider Type     Grant Rose PTA Physical Therapist Assistant                                 PT Assessment/Plan       Row Name 12/19/23 1318          PT Assessment    Assessment Comments pt continues to progress with increased tolerance to functional mobility (increased gait distances/stair climbing); pt is (I) with and tolerating well current HEP  -        PT Plan    PT Plan Comments Due to pending insurance changes at the start of the year, pt states she thinks she is ready to continue with HEP and trial attending [employer] at this time  -               User Key  (r) = Recorded By, (t) = Taken By, (c) = Cosigned By      Initials Name Provider Type     Grant Rose PTA Physical Therapist Assistant                       OP Exercises       Row Name 12/19/23 1321 12/19/23 1000          Subjective    Subjective Comments -- pt states she has had a rough couple of weeks with her  and mother being in the hospital - tolerated all the walking well overall; had been doing the steps at home and even went up/down steps at the hospital without her  present  -        Total Minutes    70505 - PT Therapeutic Exercise Minutes 40  -MH --        Exercise 1    Exercise Name 1 -- supine GS  -     Sets 1 -- HEP  -        Exercise 2    Exercise Name 2 -- PPT  -     Cueing 2 -- " "Verbal;Tactile;Demo  -     Reps 2 -- 20  -MH     Time 2 -- 5 sec  -        Exercise 3    Exercise Name 3 -- PPT with ball squeeze  -     Cueing 3 -- Verbal;Tactile;Demo  -     Reps 3 -- 20  -MH     Time 3 -- 5 sec  -        Exercise 4    Exercise Name 4 -- PPT with hooklying clam shells vs TB  -MH     Cueing 4 -- Verbal;Tactile;Demo  -     Reps 4 -- 20  -MH     Time 4 -- 5 sec  -     Additional Comments -- blue  -        Exercise 5    Exercise Name 5 -- (B) SAQ  -     Cueing 5 -- Verbal;Tactile;Demo  -     Reps 5 -- 25  -MH     Time 5 -- 5 sec  -     Additional Comments -- 1#  -        Exercise 6    Exercise Name 6 -- sit to stand  -     Cueing 6 -- Verbal;Tactile;Demo  -     Reps 6 -- 10 reps from arm chair with (B) UE assist; 5 reps from elevated table and no UE assist  -        Exercise 7    Exercise Name 7 -- 6\" Forward step ups  -     Cueing 7 -- Verbal  -     Reps 7 -- 20 each  -     Time 7 -- (B) hand rails  -        Exercise 8    Exercise Name 8 -- (B) SLR w/abdominal bracing  -     Cueing 8 -- Verbal;Tactile  -     Reps 8 -- 20  -     Time 8 -- 2-3 sec  -     Additional Comments -- 1#  -        Exercise 9    Exercise Name 9 -- (B) sidelying clams  -     Cueing 9 -- Verbal;Tactile;Demo  -     Reps 9 -- 20 each  -     Time 9 -- blue  -        Exercise 10    Exercise Name 10 -- (B) hamstring stretch  -     Cueing 10 -- Verbal;Tactile;Demo  -     Reps 10 -- 10  -     Time 10 -- 10 sec  -        Exercise 11    Exercise Name 11 -- PPT with march  -     Cueing 11 -- Verbal;Tactile;Demo  -     Reps 11 -- 20 each  -        Exercise 12    Exercise Name 12 -- (B) TKE vs TB  -     Cueing 12 -- Verbal;Tactile;Demo  -     Reps 12 -- 20  -MH     Time 12 -- 5 sec  -     Additional Comments -- blue  -        Exercise 13    Exercise Name 13 -- seated alt march w/abd bracing  -     Cueing 13 -- Verbal;Tactile;Demo  -     Reps 13 -- 20 each  -  "    Time 13 -- blue  -        Exercise 14    Exercise Name 14 -- partial wall slides  -     Cueing 14 -- Verbal;Demo  -     Reps 14 -- 10  -               User Key  (r) = Recorded By, (t) = Taken By, (c) = Cosigned By      Initials Name Provider Type    Grant Cuellar PTA Physical Therapist Assistant                                     Therapy Education  Education Details: discussed progression of reps at home with ex's she used ankle wt (pt does not have wts at home)  Given: HEP, Symptoms/condition management  Program: Reinforced, Progressed  How Provided: Verbal, Demonstration  Provided to: Patient  Level of Understanding: Teach back education performed, Verbalized, Demonstrated              Time Calculation:   Start Time: 1000  Stop Time: 1044  Time Calculation (min): 44 min  Timed Charges  42392 - PT Therapeutic Exercise Minutes: 40  Total Minutes  Timed Charges Total Minutes: 40   Total Minutes: 40  Therapy Charges for Today       Code Description Service Date Service Provider Modifiers Qty    26356502207 HC PT THER PROC EA 15 MIN 12/19/2023 Grant Rose PTA GP 3                      Grant Rose PTA  12/19/2023

## 2024-07-11 ENCOUNTER — TRANSCRIBE ORDERS (OUTPATIENT)
Dept: ADMINISTRATIVE | Facility: HOSPITAL | Age: 69
End: 2024-07-11
Payer: MEDICARE

## 2024-07-11 DIAGNOSIS — Z12.31 BREAST CANCER SCREENING BY MAMMOGRAM: Primary | ICD-10-CM

## 2024-07-31 ENCOUNTER — TELEPHONE (OUTPATIENT)
Dept: NEUROLOGY | Facility: CLINIC | Age: 69
End: 2024-07-31
Payer: MEDICARE

## 2024-08-01 ENCOUNTER — TELEPHONE (OUTPATIENT)
Dept: NEUROLOGY | Facility: CLINIC | Age: 69
End: 2024-08-01
Payer: MEDICARE

## 2024-08-01 ENCOUNTER — OFFICE VISIT (OUTPATIENT)
Dept: NEUROLOGY | Facility: CLINIC | Age: 69
End: 2024-08-01
Payer: MEDICARE

## 2024-08-01 ENCOUNTER — LAB (OUTPATIENT)
Dept: LAB | Facility: HOSPITAL | Age: 69
End: 2024-08-01
Payer: MEDICARE

## 2024-08-01 VITALS
DIASTOLIC BLOOD PRESSURE: 68 MMHG | OXYGEN SATURATION: 99 % | SYSTOLIC BLOOD PRESSURE: 110 MMHG | BODY MASS INDEX: 27.9 KG/M2 | HEART RATE: 66 BPM | WEIGHT: 162.6 LBS

## 2024-08-01 DIAGNOSIS — R20.2 NUMBNESS AND TINGLING OF BOTH LEGS: ICD-10-CM

## 2024-08-01 DIAGNOSIS — R20.0 NUMBNESS AND TINGLING OF BOTH LEGS: ICD-10-CM

## 2024-08-01 DIAGNOSIS — G95.9 MYELOPATHY: ICD-10-CM

## 2024-08-01 DIAGNOSIS — G62.9 POLYNEUROPATHY: Primary | ICD-10-CM

## 2024-08-01 DIAGNOSIS — E74.819 DISORDERS OF GLUCOSE TRANSPORT, UNSPECIFIED: ICD-10-CM

## 2024-08-01 DIAGNOSIS — G62.9 POLYNEUROPATHY: ICD-10-CM

## 2024-08-01 LAB
HBA1C MFR BLD: 5.4 % (ref 4.8–5.6)
T4 FREE SERPL-MCNC: 1.01 NG/DL (ref 0.92–1.68)
TSH SERPL DL<=0.05 MIU/L-ACNC: 1.92 UIU/ML (ref 0.27–4.2)
VIT B12 BLD-MCNC: 1015 PG/ML (ref 211–946)

## 2024-08-01 PROCEDURE — 83921 ORGANIC ACID SINGLE QUANT: CPT

## 2024-08-01 PROCEDURE — 82784 ASSAY IGA/IGD/IGG/IGM EACH: CPT

## 2024-08-01 PROCEDURE — 1160F RVW MEDS BY RX/DR IN RCRD: CPT | Performed by: PSYCHIATRY & NEUROLOGY

## 2024-08-01 PROCEDURE — 84443 ASSAY THYROID STIM HORMONE: CPT | Performed by: PSYCHIATRY & NEUROLOGY

## 2024-08-01 PROCEDURE — 99205 OFFICE O/P NEW HI 60 MIN: CPT | Performed by: PSYCHIATRY & NEUROLOGY

## 2024-08-01 PROCEDURE — 3078F DIAST BP <80 MM HG: CPT | Performed by: PSYCHIATRY & NEUROLOGY

## 2024-08-01 PROCEDURE — 86334 IMMUNOFIX E-PHORESIS SERUM: CPT

## 2024-08-01 PROCEDURE — 83036 HEMOGLOBIN GLYCOSYLATED A1C: CPT

## 2024-08-01 PROCEDURE — 36415 COLL VENOUS BLD VENIPUNCTURE: CPT

## 2024-08-01 PROCEDURE — 84165 PROTEIN E-PHORESIS SERUM: CPT

## 2024-08-01 PROCEDURE — 84439 ASSAY OF FREE THYROXINE: CPT | Performed by: PSYCHIATRY & NEUROLOGY

## 2024-08-01 PROCEDURE — 84155 ASSAY OF PROTEIN SERUM: CPT

## 2024-08-01 PROCEDURE — 1159F MED LIST DOCD IN RCRD: CPT | Performed by: PSYCHIATRY & NEUROLOGY

## 2024-08-01 PROCEDURE — 82607 VITAMIN B-12: CPT

## 2024-08-01 PROCEDURE — 3074F SYST BP LT 130 MM HG: CPT | Performed by: PSYCHIATRY & NEUROLOGY

## 2024-08-01 RX ORDER — VITAMIN B COMPLEX
CAPSULE ORAL DAILY
COMMUNITY

## 2024-08-01 RX ORDER — METHOTREXATE 2.5 MG/1
TABLET ORAL
COMMUNITY
Start: 2024-06-04

## 2024-08-01 RX ORDER — DULOXETIN HYDROCHLORIDE 60 MG/1
CAPSULE, DELAYED RELEASE ORAL
COMMUNITY
Start: 2024-07-22

## 2024-08-01 RX ORDER — DULOXETIN HYDROCHLORIDE 30 MG/1
CAPSULE, DELAYED RELEASE ORAL
COMMUNITY
Start: 2024-06-06

## 2024-08-01 RX ORDER — MEDROXYPROGESTERONE ACETATE 150 MG/ML
INJECTION, SUSPENSION INTRAMUSCULAR
COMMUNITY
Start: 2024-07-10

## 2024-08-01 NOTE — PROGRESS NOTES
Notes by MA:  Pt is here today as a referral from Dr Erwin. She has been experiencing BLE neuropathy.      Subjective:     Dear Dr. Erwin, thank you for referring Mrs. Andrew for consultation.  As you know, she is a 68-year-old woman who began having problems with her legs last year.  She reportedly had a nerve conduction study and EMG of the lower extremities which suggested peripheral polyneuropathy.  The results of that test are not available for our review today.  She subsequently had an MRI of the C-spine and T-spine.  Her cervical spine revealed degenerative changes reducing the caliber of the spinal canal and abutting the anterior portion of the cervical spinal cord.  The T-spine MRI results were not available for review but apparently demonstrated cord compression from degenerative changes at the T11-T12 level and she was taken to surgery.  Since surgery she has had a very slow recovery with respect to walking and strength and is now graduated from a walker to a cane.  She has completed her formal physical therapy and is doing her exercises at home.  She still has a lot of pain aching and discomfort in the medial thighs bilaterally, none on the lateral thighs or in the feet.  This is worse when she is sitting down or laying down and improved when she is up and about.  She takes Cymbalta 90 mg every evening with modest benefit but still has to take multiple doses of Tylenol throughout the day because of pain.  She failed carbamazepine, Lyrica, and gabapentin.  Patient ID: Leann Andrew is a 68 y.o. female.    Pain  Associated symptoms include arthralgias, fatigue and numbness. Pertinent negatives include no abdominal pain, chest pain, headaches, joint swelling, myalgias, nausea, neck pain, vomiting or weakness.     The following portions of the patient's history were reviewed and updated as appropriate: allergies, current medications, past family history, past medical history, past social  history, past surgical history, and problem list.    Review of Systems   Constitutional:  Positive for fatigue. Negative for activity change and appetite change.   HENT:  Negative for facial swelling, trouble swallowing and voice change.    Eyes:  Negative for photophobia, pain and visual disturbance.   Respiratory:  Negative for chest tightness, shortness of breath and wheezing.    Cardiovascular:  Negative for chest pain, palpitations and leg swelling.   Gastrointestinal:  Negative for abdominal pain, nausea and vomiting.   Endocrine: Negative for cold intolerance and heat intolerance.   Musculoskeletal:  Positive for arthralgias, back pain and gait problem. Negative for joint swelling, myalgias, neck pain and neck stiffness.   Neurological:  Positive for numbness. Negative for dizziness, tremors, seizures, syncope, facial asymmetry, speech difficulty, weakness, light-headedness and headaches.   Hematological:  Bruises/bleeds easily.   Psychiatric/Behavioral:  Negative for agitation, behavioral problems, confusion, decreased concentration, dysphoric mood, hallucinations, self-injury, sleep disturbance and suicidal ideas. The patient is not nervous/anxious and is not hyperactive.         Objective:    Neurologic Exam  Awake alert pleasant cooperative and cognitively well-preserved.  Speech and language functions are normal.    Cranial nerves II through XII normal and symmetric.    Motor exam reveals normal symmetric tone and bulk and power in both upper extremities without drift.  In the lower extremities, she has reasonable bulk and power proximally and distally in the lower extremities.  She arises from a chair independently.  She walks in a slightly widened base but with normal arm swing.    Coordination testing reveals normal and accurate finger-nose-finger bilaterally with normal rapid alternating movements.  As noted above, her gait examination is notable for a little bit of midline ataxia.    Sensory exam  reveals a decrease in light touch and temperature sensation to above ankle level bilaterally and symmetrically and a loss of vibration sensation to above the level of the knees bilaterally and symmetrically.  Reduced proprioception at the level of the feet bilaterally.    Tendon reflexes are 2+ and symmetric in the arms, trace at the knees and 2-3+ at both ankles.  She has an extensor left plantar sign and an equivocal right plantar sign.  Physical Exam    Assessment/Plan:     Diagnoses and all orders for this visit:    1. Polyneuropathy (Primary)  -     Vitamin B12; Future  -     Methylmalonic Acid, Serum; Future  -     TSH+Free T4  -     Hemoglobin A1c; Future  -     Cancel: Protein Elec + Interp, Serum  -     Immunofixation electrophoresis; Future    2. Numbness and tingling of both legs  -     TSH+Free T4    3. Disorders of glucose transport, unspecified  -     Hemoglobin A1c; Future    4. Myelopathy     Mrs. Andrew is a 68-year-old woman with signs and symptoms of peripheral polyneuropathy.  She had electrodiagnostic testing last year and we are trying to arrange for those results to be sent to us.  In the meantime, she does not have clear risk factors for this and I am moving ahead with a laboratory workup for that purpose.    Presentation is complicated by what appears to be a spinal myelopathy that required neurosurgery on her lower thoracic spine last year.  I think this is what is primarily contributing to her thigh pain and weakness.  She is improving gradually from an weakness and ambulatory standpoint since completing physical therapy and continuing her home exercises.  She has now graduated to a cane.      From a symptomatic standpoint we are simply adjusting the timing of her Cymbalta to see if we can get her a little more improvement so that she does not take as much Tylenol through the day.  I will review her laboratory workup as it evolves and take any further measures that may be necessary but  otherwise we will see her back in 3 months time.  Thank you for the opportunity to participate in the care of this delightful woman.    Addendum: We received her nerve conduction study and EMG needle examination from April of last year.  The study does reveal the presence of an axonal sensorimotor peripheral polyneuropathy with no evidence for radiculopathy.  This does not change our plan as outlined above.    65 minutes total patient care time including extensive record review, examination, discussion and counseling, , and documentation.

## 2024-08-01 NOTE — LETTER
August 8, 2024       No Recipients    Patient: Leann Andrew   YOB: 1955   Date of Visit: 8/1/2024     Dear Nick Erwin MD:       Thank you for referring Leann Andrew to me for evaluation. Below are the relevant portions of my assessment and plan of care.    If you have questions, please do not hesitate to call me. I look forward to following Leann along with you.         Sincerely,        Antonio Smiley MD        CC:   No Recipients    Antonio Smiley MD  08/07/24 1016  Signed  Notes by MA:  Pt is here today as a referral from Dr Erwin. She has been experiencing BLE neuropathy.      Subjective:     Dear Dr. Erwin, thank you for referring Mrs. Andrew for consultation.  As you know, she is a 68-year-old woman who began having problems with her legs last year.  She reportedly had a nerve conduction study and EMG of the lower extremities which suggested peripheral polyneuropathy.  The results of that test are not available for our review today.  She subsequently had an MRI of the C-spine and T-spine.  Her cervical spine revealed degenerative changes reducing the caliber of the spinal canal and abutting the anterior portion of the cervical spinal cord.  The T-spine MRI results were not available for review but apparently demonstrated cord compression from degenerative changes at the T11-T12 level and she was taken to surgery.  Since surgery she has had a very slow recovery with respect to walking and strength and is now graduated from a walker to a cane.  She has completed her formal physical therapy and is doing her exercises at home.  She still has a lot of pain aching and discomfort in the medial thighs bilaterally, none on the lateral thighs or in the feet.  This is worse when she is sitting down or laying down and improved when she is up and about.  She takes Cymbalta 90 mg every evening with modest benefit but still has to take multiple doses of Tylenol  throughout the day because of pain.  She failed carbamazepine, Lyrica, and gabapentin.  Patient ID: Laenn Andrew is a 68 y.o. female.    Pain  Associated symptoms include arthralgias, fatigue and numbness. Pertinent negatives include no abdominal pain, chest pain, headaches, joint swelling, myalgias, nausea, neck pain, vomiting or weakness.     The following portions of the patient's history were reviewed and updated as appropriate: allergies, current medications, past family history, past medical history, past social history, past surgical history, and problem list.    Review of Systems   Constitutional:  Positive for fatigue. Negative for activity change and appetite change.   HENT:  Negative for facial swelling, trouble swallowing and voice change.    Eyes:  Negative for photophobia, pain and visual disturbance.   Respiratory:  Negative for chest tightness, shortness of breath and wheezing.    Cardiovascular:  Negative for chest pain, palpitations and leg swelling.   Gastrointestinal:  Negative for abdominal pain, nausea and vomiting.   Endocrine: Negative for cold intolerance and heat intolerance.   Musculoskeletal:  Positive for arthralgias, back pain and gait problem. Negative for joint swelling, myalgias, neck pain and neck stiffness.   Neurological:  Positive for numbness. Negative for dizziness, tremors, seizures, syncope, facial asymmetry, speech difficulty, weakness, light-headedness and headaches.   Hematological:  Bruises/bleeds easily.   Psychiatric/Behavioral:  Negative for agitation, behavioral problems, confusion, decreased concentration, dysphoric mood, hallucinations, self-injury, sleep disturbance and suicidal ideas. The patient is not nervous/anxious and is not hyperactive.         Objective:    Neurologic Exam  Awake alert pleasant cooperative and cognitively well-preserved.  Speech and language functions are normal.    Cranial nerves II through XII normal and symmetric.    Motor exam  reveals normal symmetric tone and bulk and power in both upper extremities without drift.  In the lower extremities, she has reasonable bulk and power proximally and distally in the lower extremities.  She arises from a chair independently.  She walks in a slightly widened base but with normal arm swing.    Coordination testing reveals normal and accurate finger-nose-finger bilaterally with normal rapid alternating movements.  As noted above, her gait examination is notable for a little bit of midline ataxia.    Sensory exam reveals a decrease in light touch and temperature sensation to above ankle level bilaterally and symmetrically and a loss of vibration sensation to above the level of the knees bilaterally and symmetrically.  Reduced proprioception at the level of the feet bilaterally.    Tendon reflexes are 2+ and symmetric in the arms, trace at the knees and 2-3+ at both ankles.  She has an extensor left plantar sign and an equivocal right plantar sign.  Physical Exam    Assessment/Plan:     Diagnoses and all orders for this visit:    1. Polyneuropathy (Primary)  -     Vitamin B12; Future  -     Methylmalonic Acid, Serum; Future  -     TSH+Free T4  -     Hemoglobin A1c; Future  -     Cancel: Protein Elec + Interp, Serum  -     Immunofixation electrophoresis; Future    2. Numbness and tingling of both legs  -     TSH+Free T4    3. Disorders of glucose transport, unspecified  -     Hemoglobin A1c; Future    4. Myelopathy     Mrs. Andrew is a 68-year-old woman with signs and symptoms of peripheral polyneuropathy.  She had electrodiagnostic testing last year and we are trying to arrange for those results to be sent to us.  In the meantime, she does not have clear risk factors for this and I am moving ahead with a laboratory workup for that purpose.    Presentation is complicated by what appears to be a spinal myelopathy that required neurosurgery on her lower thoracic spine last year.  I think this is what is  primarily contributing to her thigh pain and weakness.  She is improving gradually from an weakness and ambulatory standpoint since completing physical therapy and continuing her home exercises.  She has now graduated to a cane.      From a symptomatic standpoint we are simply adjusting the timing of her Cymbalta to see if we can get her a little more improvement so that she does not take as much Tylenol through the day.  I will review her laboratory workup as it evolves and take any further measures that may be necessary but otherwise we will see her back in 3 months time.  Thank you for the opportunity to participate in the care of this delightful woman.    Addendum: We received her nerve conduction study and EMG needle examination from April of last year.  The study does reveal the presence of an axonal sensorimotor peripheral polyneuropathy with no evidence for radiculopathy.  This does not change our plan as outlined above.    65 minutes total patient care time including extensive record review, examination, discussion and counseling, , and documentation.

## 2024-08-02 LAB
ALBUMIN SERPL ELPH-MCNC: 4.4 G/DL (ref 2.9–4.4)
ALBUMIN/GLOB SERPL: 2.1 {RATIO} (ref 0.7–1.7)
ALPHA1 GLOB SERPL ELPH-MCNC: 0.2 G/DL (ref 0–0.4)
ALPHA2 GLOB SERPL ELPH-MCNC: 0.5 G/DL (ref 0.4–1)
B-GLOBULIN SERPL ELPH-MCNC: 0.7 G/DL (ref 0.7–1.3)
GAMMA GLOB SERPL ELPH-MCNC: 0.7 G/DL (ref 0.4–1.8)
GLOBULIN SER-MCNC: 2.1 G/DL (ref 2.2–3.9)
IGA SERPL-MCNC: 61 MG/DL (ref 87–352)
IGG SERPL-MCNC: 811 MG/DL (ref 586–1602)
IGM SERPL-MCNC: 47 MG/DL (ref 26–217)
INTERPRETATION SERPL IEP-IMP: ABNORMAL
LABORATORY COMMENT REPORT: ABNORMAL
M PROTEIN SERPL ELPH-MCNC: ABNORMAL G/DL
PROT SERPL-MCNC: 6.5 G/DL (ref 6–8.5)

## 2024-08-06 LAB — METHYLMALONATE SERPL-SCNC: 211 NMOL/L (ref 0–378)

## 2024-08-14 ENCOUNTER — TELEPHONE (OUTPATIENT)
Dept: NEUROLOGY | Facility: CLINIC | Age: 69
End: 2024-08-14
Payer: MEDICARE

## 2024-08-14 NOTE — TELEPHONE ENCOUNTER
Caller: Leann Andrew    Relationship: Self    Best call back number: [phone]    What test was performed: LABS/BLOOD WORK    When was the test performed: 8/1/2024    Where was the test performed: [facility]    Additional notes: PT CALLING FOR LAB RESULTS.    PLEASE REVIEW AND ADVISE.

## 2024-08-19 ENCOUNTER — HOSPITAL ENCOUNTER (OUTPATIENT)
Dept: MAMMOGRAPHY | Facility: HOSPITAL | Age: 69
Discharge: HOME | End: 2024-08-19
Admitting: OBSTETRICS & GYNECOLOGY
Payer: MEDICARE

## 2024-08-19 DIAGNOSIS — Z12.31 BREAST CANCER SCREENING BY MAMMOGRAM: ICD-10-CM

## 2024-08-19 PROCEDURE — 77067 SCR MAMMO BI INCL CAD: CPT

## 2024-08-19 PROCEDURE — 77063 BREAST TOMOSYNTHESIS BI: CPT | Performed by: RADIOLOGY

## 2024-08-19 PROCEDURE — 77063 BREAST TOMOSYNTHESIS BI: CPT

## 2024-08-19 PROCEDURE — 77067 SCR MAMMO BI INCL CAD: CPT | Performed by: RADIOLOGY

## 2024-10-31 ENCOUNTER — OFFICE VISIT (OUTPATIENT)
Dept: NEUROLOGY | Facility: CLINIC | Age: 69
End: 2024-10-31
Payer: MEDICARE

## 2024-10-31 VITALS
SYSTOLIC BLOOD PRESSURE: 118 MMHG | HEART RATE: 66 BPM | DIASTOLIC BLOOD PRESSURE: 64 MMHG | WEIGHT: 169.4 LBS | BODY MASS INDEX: 29.06 KG/M2 | OXYGEN SATURATION: 99 %

## 2024-10-31 DIAGNOSIS — G62.9 POLYNEUROPATHY: ICD-10-CM

## 2024-10-31 DIAGNOSIS — G95.9 MYELOPATHY: Primary | ICD-10-CM

## 2024-10-31 NOTE — PROGRESS NOTES
Notes by MA:  Ms Andrew is here today for a follow up appointment regarding neuropathy. She feels that her condition is a little worse.      Subjective:     Patient ID: Leann Andrew is a 68 y.o. female.    History of Present Illness  The following portions of the patient's history were reviewed and updated as appropriate: allergies, current medications, past family history, past medical history, past social history, past surgical history, and problem list.    Review of Systems   Musculoskeletal:  Positive for gait problem (balance).   Neurological:  Positive for numbness.        Objective:    Neurological Exam   Awake alert pleasant cooperative and cognitively well-preserved.  Speech and language functions are normal.     Cranial nerves II through XII normal and symmetric.     Motor exam reveals normal symmetric tone and bulk and power in both upper extremities without drift.  In the lower extremities, she has reasonable bulk and power proximally and distally in the lower extremities.  She arises from a chair independently.  She walks in a slightly widened base but with normal arm swing.     Coordination testing reveals normal and accurate finger-nose-finger bilaterally with normal rapid alternating movements.  As noted above, her gait examination is notable for a little bit of midline ataxia.     Sensory exam reveals a decrease in light touch and temperature sensation to above ankle level bilaterally and symmetrically and a loss of vibration sensation to above the level of the knees bilaterally and symmetrically.  Reduced proprioception at the level of the feet bilaterally.     Tendon reflexes are 2+ and symmetric in the arms, trace at the knees and 2-3+ at both ankles.  She has an extensor left plantar sign and an equivocal right plantar sign.  Physical Exam    Assessment/Plan:     Diagnoses and all orders for this visit:    1. Myelopathy (Primary)    2. Polyneuropathy    Other orders  -     amitriptyline  (ELAVIL) 25 MG tablet; Take 1 tablet by mouth Every Night.  Dispense: 90 tablet; Refill: 3     She is not responding well to adjustment in Cymbalta doses (currently at 60 mg in the morning and 30 mg nightly.  She had a rash with gabapentin, joint pain with Lyrica and received no benefit from Carbatrol.  Therefore adding amitriptyline 25 mg nightly and discontinuing her nightly Cymbalta.  Again she has multiple contributions to her discomfort including thoracic myelopathy and peripheral polyneuropathy, as noted previously.  Will have her come back in a short period of time to see Catie and make adjustments if necessary.  Antispasmodic such as baclofen could be considered if necessary to help with any discomfort or possible spasms from her thoracic myelopathy.

## 2024-12-11 ENCOUNTER — TELEPHONE (OUTPATIENT)
Dept: NEUROLOGY | Facility: CLINIC | Age: 69
End: 2024-12-11

## 2024-12-11 NOTE — TELEPHONE ENCOUNTER
The [facility] received a fax that requires your attention. The document has been indexed to the patient’s chart for your review.    Reason for sending: MEDICAL RECORDS NOTIFICATION    Documents Description: PROG NOTE_[facility]_12/10/24    Name of Sender: ALICIA UMANA CMA LMR    Date Indexed: 12/11/24

## 2024-12-31 ENCOUNTER — OFFICE VISIT (OUTPATIENT)
Dept: NEUROLOGY | Facility: CLINIC | Age: 69
End: 2024-12-31
Payer: MEDICARE

## 2024-12-31 VITALS
HEART RATE: 79 BPM | OXYGEN SATURATION: 98 % | DIASTOLIC BLOOD PRESSURE: 70 MMHG | HEIGHT: 64 IN | WEIGHT: 171 LBS | SYSTOLIC BLOOD PRESSURE: 128 MMHG | BODY MASS INDEX: 29.19 KG/M2

## 2024-12-31 DIAGNOSIS — G95.9 MYELOPATHY: ICD-10-CM

## 2024-12-31 DIAGNOSIS — G62.9 POLYNEUROPATHY: ICD-10-CM

## 2024-12-31 DIAGNOSIS — R20.2 NUMBNESS AND TINGLING OF BOTH LEGS: Primary | ICD-10-CM

## 2024-12-31 DIAGNOSIS — R20.0 NUMBNESS AND TINGLING OF BOTH LEGS: Primary | ICD-10-CM

## 2024-12-31 PROCEDURE — 99214 OFFICE O/P EST MOD 30 MIN: CPT | Performed by: NURSE PRACTITIONER

## 2024-12-31 PROCEDURE — 3074F SYST BP LT 130 MM HG: CPT | Performed by: NURSE PRACTITIONER

## 2024-12-31 PROCEDURE — 1159F MED LIST DOCD IN RCRD: CPT | Performed by: NURSE PRACTITIONER

## 2024-12-31 PROCEDURE — 1160F RVW MEDS BY RX/DR IN RCRD: CPT | Performed by: NURSE PRACTITIONER

## 2024-12-31 PROCEDURE — 3078F DIAST BP <80 MM HG: CPT | Performed by: NURSE PRACTITIONER

## 2024-12-31 RX ORDER — CARVEDILOL 12.5 MG/1
12.5 TABLET, FILM COATED ORAL 2 TIMES DAILY WITH MEALS
COMMUNITY

## 2024-12-31 RX ORDER — BACLOFEN 10 MG/1
TABLET ORAL
Qty: 30 TABLET | Refills: 0 | Status: SHIPPED | OUTPATIENT
Start: 2024-12-31

## 2024-12-31 NOTE — PROGRESS NOTES
Notes by LPN:  Patient presents for follow up for neuropathy. Still having nerve pain, new med doesn't really help.      CC:    HPI:  Leann Andrew is a  69 y.o. female known past medical history of CKD-stage III, DDD, hyperlipidemia, hypertension and rheumatoid arthritis I am seeing today in follow myopathy/polyneuropathy-last seen by my colleague October 31, 2024 for the same at that time recommend continuing Cymbalta milligram in a.m. and 30 mg at nightly due to dosing side effects-unfortunately patient had a rash gabapentin and joint pain with and received no benefit from Carbatrol-amitriptyline 25 mg nightly at and nightly Cymbalta discontinued.  Since that time, patient reports no change in symptoms.  Discussed adding antispasmodic baclofen to assist with spasms from thoracic myelopathy/pain/discomfort which patient is amendable to titration discussed below.  Also discussed use of additional Rx alternatives topical pain cream.  She has had 1 fall this year at [employer] resulting in right shoulder injury.  She has had prior EMG-no results available at time of exam we will attempt to obtain if unable to obtain will consider repeating EMG.    Neurologically stable.  Some sensory and reflex deficits as noted below.  Will recommend follow-up in 3 months; sooner if needed.        Past Medical History:   Diagnosis Date    Anemia     Back pain     NUMBNESS/ TINGLINGPAIN DOWN BILAT LOWER EXTREMITIES    Cataract     Chronic kidney disease     STAGE III - FOLLOWED ONLY BY PCP    DDD (degenerative disc disease), lumbar     Female infertility     Gout     Heart murmur     NO PROBLEMS    Hyperlipidemia     Hypertension     RA (rheumatoid arthritis)     NO PROBLEMS WITH FLEX/ EXTENSION         Past Surgical History:   Procedure Laterality Date    COLONOSCOPY N/A 5/20/2021    Procedure: COLONOSCOPY INTO CECUM WITH COLD BIOPSY POLYPECTOMIES;  Surgeon: Chuy Bronson Jr., MD;  Location: [facility] ENDOSCOPY;  Service:  General;  Laterality: N/A;  PRE- SCREENING  POST- POLYPS    EPIDURAL BLOCK      FOOT SURGERY Right     RHEUMATOID BONE SPURS REMOVED, GRAFT FROM KNEE    HYSTERECTOMY      THORACIC DECOMPRESSION POSTERIOR FUSION WITH INSTRUMENTATION N/A 7/19/2023    Procedure: T11-12 LAMINECTOMY FUSION;  Surgeon: Smith Montgomery MD;  Location: [facility] OR;  Service: Orthopedic Spine;  Laterality: N/A;           Current Outpatient Medications:     Acetaminophen (Tylenol) 325 MG capsule, Take 500 mg by mouth As Needed., Disp: , Rfl:     allopurinol (ZYLOPRIM) 100 MG tablet, Take 1 tablet by mouth 2 (Two) Times a Day., Disp: , Rfl:     amitriptyline (ELAVIL) 25 MG tablet, Take 1 tablet by mouth Every Night., Disp: 90 tablet, Rfl: 3    amLODIPine (NORVASC) 10 MG tablet, Take 1 tablet by mouth Daily., Disp: , Rfl:     atorvastatin (LIPITOR) 10 MG tablet, Take 1 tablet by mouth Daily., Disp: , Rfl:     B Complex Vitamins (vitamin b complex) capsule capsule, Take  by mouth Daily., Disp: , Rfl:     baclofen (LIORESAL) 10 MG tablet, 1/2 tablet x 3 nights if tolerated then increase to 1 tablet nightly, Disp: 30 tablet, Rfl: 0    Cholecalciferol (VITAMIN D3) 1000 units capsule, Take 2 capsules by mouth Daily., Disp: , Rfl:     Coreg 12.5 MG tablet, Take 1 tablet by mouth 2 (Two) Times a Day With Meals., Disp: , Rfl:     diphenhydrAMINE (BENADRYL) 25 mg capsule, Take 1 capsule by mouth Every 6 (Six) Hours As Needed for Allergies., Disp: , Rfl:     DULoxetine (CYMBALTA) 60 MG capsule, , Disp: , Rfl:     Enbrel SureClick 50 MG/ML solution auto-injector, , Disp: , Rfl:     folic acid (FOLVITE) 400 MCG tablet, Take 1 tablet by mouth 3 (Three) Times a Day., Disp: , Rfl:     HYDROcodone-acetaminophen (Norco) 7.5-325 MG per tablet, Take 1 tablet by mouth Every 6 (Six) Hours As Needed for Moderate Pain. (Patient not taking: Reported on 8/1/2024), Disp: 50 tablet, Rfl: 0    Ibuprofen 3 %, Gabapentin 10 %, Baclofen 2 %, lidocaine 4 %, Ketamine HCl 10  "%, Apply 1-2 g topically to the appropriate area as directed 3 (Three) to 4 (Four) times daily., Disp: 90 g, Rfl: 5    losartan (COZAAR) 50 MG tablet, Take 1 tablet by mouth Every Night. TO HOLD NIGHT BEFORE SURGERY, Disp: , Rfl:     methotrexate 2.5 MG tablet, , Disp: , Rfl:     triamterene-hydrochlorothiazide (MAXZIDE-25) 37.5-25 MG per tablet, Take 1 tablet by mouth Every Other Day., Disp: , Rfl:     TURMERIC PO, Take  by mouth., Disp: , Rfl:       Family History   Problem Relation Age of Onset    Breast cancer Neg Hx     Ovarian cancer Neg Hx     Uterine cancer Neg Hx     Colon cancer Neg Hx     Deep vein thrombosis Neg Hx     Pulmonary embolism Neg Hx     Malig Hyperthermia Neg Hx          Social History     Socioeconomic History    Marital status:    Tobacco Use    Smoking status: Never    Smokeless tobacco: Never   Vaping Use    Vaping status: Never Used   Substance and Sexual Activity    Alcohol use: Yes     Comment: wine occasional    Drug use: No    Sexual activity: Not Currently     Birth control/protection: Hysterectomy, Post-menopausal         Allergies   Allergen Reactions    Lisinopril Swelling and Cough         Physical Exam:  Vitals:    12/31/24 1340   BP: 128/70   BP Location: Left arm   Patient Position: Sitting   Cuff Size: Adult   Pulse: 79   SpO2: 98%   Weight: 77.6 kg (171 lb)   Height: 162.6 cm (64.02\")     Body mass index is 29.34 kg/m².  Head: Head is erect and midline: skull is normocephalic, symmetrical and smooth without deformity. Facial features are symmetrical;   Neck:  Trachea is midline; no JVD.   SEyes: conjunctivae pink; sclerae white; PERRL. No tearing noted. Pupils constricted 4mm to 2mm   Ears:  Normal position.  Chest:  The trachea is midline. The chest is normal in appearance. The chest is clear to percussion and auscultation.  Heart:  HR 79 beats per minute, S1 and S2 noted with regular rhythm. /70. No abnormal jugular venous distention. No rubs, murmurs, or " gallops noted upon auscultation.   Musculoskeletal:  The extremities are normal in color, size, and temperature. All pulses in the upper and lower extremities are grade II and equal. No clubbing, cyanosis, or edema is present.   Neurological:  Alert, relaxed, cooperative. Thought process coherent. Oriented to person, place and time. CN II- XII intact. Good muscle bulk and tone. Strength 5/5 throughout. Cerebellar: Rapid alternating movements:finger-to-nose intact. Gait with normal base.  Sensory: Decreased vibration and temperature/touch bilateral lower extremities left lower extremity greater than right.  Reflexes decreased left lower extremity.        Results:  Lab Results   Component Value Date    GLUCOSE 105 (H) 07/24/2023    BUN 15 07/24/2023    CREATININE 0.87 07/24/2023    BCR 17.2 07/24/2023    CO2 28.7 07/24/2023    CALCIUM 9.6 07/24/2023    PROTENTOTREF 6.5 08/01/2024    ALBUMIN 4.4 08/01/2024    LABIL2 2.1 (H) 08/01/2024    AST 23 07/20/2023    ALT 13 07/20/2023       Lab Results   Component Value Date    WBC 6.06 07/23/2023    HGB 8.4 (L) 07/23/2023    HCT 24.3 (L) 07/23/2023    .0 (H) 07/23/2023     07/23/2023       Lab Results   Component Value Date    TSH 1.920 08/01/2024         Lab Results   Component Value Date    WAOHBNGQ98 1,015 (H) 08/01/2024       Lab Results   Component Value Date    HGBA1C 5.40 08/01/2024         Assessment:   1.  Myopathy; currently on Cymbalta 60 mg in a.m. and amitriptyline 25 mg nightly with minimal relief/improvement of symptoms therefore will add baclofen to start at 5 mg nightly x 3 nights if tolerated okay to increase to full 10 mg dose thereafter.  Will also add Rx alternatives topical cream to current regimen  2.  Polyneuropathy prior EEG completed-not available at time of exam could consider repeating given persistent symptoms and lack of improvement with use of medication        Plan:  Baclofen 5 mg nightly x 3 nights if tolerated increase to 10 mg  nightly thereafter  Continue Cymbalta 60 mg in a.m.  Continue amitriptyline 25 mg nightly  Rx alternatives topical pain 4 times daily; avoid face  Obtain completed EMG from [facility]-Kathie Landin  Consider skin biopsy to exclude small fiber neuropathy  Follow-up with me in 2 to 3 months; sooner if needed        Diagnoses and all orders for this visit:    1. Numbness and tingling of both legs (Primary)  -     baclofen (LIORESAL) 10 MG tablet; 1/2 tablet x 3 nights if tolerated then increase to 1 tablet nightly  Dispense: 30 tablet; Refill: 0  -     Heavy Metals, Blood; Future  -     Cryoglobulin; Future  -     Copper, Serum; Future  -     Ibuprofen 3 %, Gabapentin 10 %, Baclofen 2 %, lidocaine 4 %, Ketamine HCl 10 %; Apply 1-2 g topically to the appropriate area as directed 3 (Three) to 4 (Four) times daily.  Dispense: 90 g; Refill: 5    2. Myelopathy  -     baclofen (LIORESAL) 10 MG tablet; 1/2 tablet x 3 nights if tolerated then increase to 1 tablet nightly  Dispense: 30 tablet; Refill: 0  -     Heavy Metals, Blood; Future  -     Cryoglobulin; Future  -     Copper, Serum; Future  -     Ibuprofen 3 %, Gabapentin 10 %, Baclofen 2 %, lidocaine 4 %, Ketamine HCl 10 %; Apply 1-2 g topically to the appropriate area as directed 3 (Three) to 4 (Four) times daily.  Dispense: 90 g; Refill: 5    3. Polyneuropathy  -     baclofen (LIORESAL) 10 MG tablet; 1/2 tablet x 3 nights if tolerated then increase to 1 tablet nightly  Dispense: 30 tablet; Refill: 0  -     Heavy Metals, Blood; Future  -     Cryoglobulin; Future  -     Copper, Serum; Future  -     Ibuprofen 3 %, Gabapentin 10 %, Baclofen 2 %, lidocaine 4 %, Ketamine HCl 10 %; Apply 1-2 g topically to the appropriate area as directed 3 (Three) to 4 (Four) times daily.  Dispense: 90 g; Refill: 5          Dictated utilizing Dragon dictation.     01/07/25  Addenda: EMG completed 4/20/2023 results that all nerves tested for normal latency, amplitudes and nerve conduction  velocity except: The left and right peroneal motor nerves demonstrated a uniform reduction in amplitude and left and right superficial peroneal and sural sensory nerves demonstrated mild decreased amplitudes.  EMG to be scanned into media

## 2025-01-02 ENCOUNTER — TELEPHONE (OUTPATIENT)
Dept: NEUROLOGY | Facility: CLINIC | Age: 70
End: 2025-01-02
Payer: MEDICARE

## 2025-01-02 NOTE — TELEPHONE ENCOUNTER
RELAY:  Was calling her back. Not sure why the dates are different, but she can get them drawn at the same time.

## 2025-01-02 NOTE — TELEPHONE ENCOUNTER
DODIE    Was calling her back. Not sure why the dates are different, but she can get them drawn at the same time.

## 2025-01-02 NOTE — TELEPHONE ENCOUNTER
Provider: BRAD    Caller: Leann Andrew    Relationship to Patient: Self    Phone Number: Mobile phone not available    Reason for Call: PATIENT IS REQUESTING A CALL BACK TO DISCUSS WHY THE LAB WORK WAS SPLIT UP. ONE DUE DATE IS 12/31/245 AND THE OTHER IS 1/5/25.

## 2025-01-13 ENCOUNTER — LAB (OUTPATIENT)
Dept: LAB | Facility: HOSPITAL | Age: 70
End: 2025-01-13
Payer: MEDICARE

## 2025-01-13 ENCOUNTER — OFFICE VISIT (OUTPATIENT)
Dept: ORTHOPEDIC SURGERY | Facility: CLINIC | Age: 70
End: 2025-01-13
Payer: MEDICARE

## 2025-01-13 VITALS
WEIGHT: 171 LBS | HEART RATE: 73 BPM | HEIGHT: 64 IN | SYSTOLIC BLOOD PRESSURE: 155 MMHG | DIASTOLIC BLOOD PRESSURE: 81 MMHG | BODY MASS INDEX: 29.19 KG/M2

## 2025-01-13 DIAGNOSIS — R20.2 NUMBNESS AND TINGLING OF BOTH LEGS: ICD-10-CM

## 2025-01-13 DIAGNOSIS — G95.9 MYELOPATHY: ICD-10-CM

## 2025-01-13 DIAGNOSIS — M25.511 RIGHT SHOULDER PAIN, UNSPECIFIED CHRONICITY: Primary | ICD-10-CM

## 2025-01-13 DIAGNOSIS — M12.811 ROTATOR CUFF ARTHROPATHY OF RIGHT SHOULDER: ICD-10-CM

## 2025-01-13 DIAGNOSIS — R20.0 NUMBNESS AND TINGLING OF BOTH LEGS: ICD-10-CM

## 2025-01-13 DIAGNOSIS — G62.9 POLYNEUROPATHY: ICD-10-CM

## 2025-01-13 PROCEDURE — 36415 COLL VENOUS BLD VENIPUNCTURE: CPT

## 2025-01-13 PROCEDURE — 82595 ASSAY OF CRYOGLOBULIN: CPT

## 2025-01-13 PROCEDURE — 20610 DRAIN/INJ JOINT/BURSA W/O US: CPT | Performed by: ORTHOPAEDIC SURGERY

## 2025-01-13 PROCEDURE — 3079F DIAST BP 80-89 MM HG: CPT | Performed by: ORTHOPAEDIC SURGERY

## 2025-01-13 PROCEDURE — 99204 OFFICE O/P NEW MOD 45 MIN: CPT | Performed by: ORTHOPAEDIC SURGERY

## 2025-01-13 PROCEDURE — 83655 ASSAY OF LEAD: CPT

## 2025-01-13 PROCEDURE — 83825 ASSAY OF MERCURY: CPT

## 2025-01-13 PROCEDURE — 82175 ASSAY OF ARSENIC: CPT

## 2025-01-13 PROCEDURE — 3077F SYST BP >= 140 MM HG: CPT | Performed by: ORTHOPAEDIC SURGERY

## 2025-01-13 PROCEDURE — 82525 ASSAY OF COPPER: CPT

## 2025-01-13 RX ORDER — LIDOCAINE HYDROCHLORIDE 10 MG/ML
4 INJECTION, SOLUTION EPIDURAL; INFILTRATION; INTRACAUDAL; PERINEURAL
Status: COMPLETED | OUTPATIENT
Start: 2025-01-13 | End: 2025-01-13

## 2025-01-13 RX ORDER — AMLODIPINE BESYLATE 5 MG/1
TABLET ORAL
COMMUNITY
Start: 2025-01-09

## 2025-01-13 RX ORDER — TRIAMCINOLONE ACETONIDE 40 MG/ML
80 INJECTION, SUSPENSION INTRA-ARTICULAR; INTRAMUSCULAR
Status: COMPLETED | OUTPATIENT
Start: 2025-01-13 | End: 2025-01-13

## 2025-01-13 RX ADMIN — LIDOCAINE HYDROCHLORIDE 4 ML: 10 INJECTION, SOLUTION EPIDURAL; INFILTRATION; INTRACAUDAL; PERINEURAL at 11:08

## 2025-01-13 RX ADMIN — TRIAMCINOLONE ACETONIDE 80 MG: 40 INJECTION, SUSPENSION INTRA-ARTICULAR; INTRAMUSCULAR at 11:08

## 2025-01-13 NOTE — PROGRESS NOTES
Subjective:     Patient ID: Leann Andrew is a 69 y.o. female.    Chief Complaint:  Right shoulder pain, new patient  History of Present Illness  History of Present Illness  The patient presents to the clinic today for evaluation of right shoulder pain.    She reports that her right shoulder pain began on 11/07/2024 following a fall at [employer] where she landed directly on her right shoulder after tripping over a chair leg. Since the incident, she has been experiencing moderate to severe throbbing pain, rated between 6 and 8 out of 10. The pain is exacerbated by overhead lifting, pushing, pulling, and even changes in position. She also reports associated weakness, a sensation of giving way, and intermittent swelling in the right shoulder. The pain intensifies with physical activities such as working, lifting, pushing, and pulling, particularly when performed overhead. She occasionally experiences pain at night. Despite performing home exercises over the past 4 weeks, she has not observed significant improvement. She reports no numbness, tingling, fevers, chills, sweats, or associated neck pain. A previous cortisone injection into the lateral subacromial space on 12/19/2024 provided relief for approximately 2 to 3 weeks.     Social History     Occupational History    Not on file   Tobacco Use    Smoking status: Never     Passive exposure: Never    Smokeless tobacco: Never   Vaping Use    Vaping status: Never Used   Substance and Sexual Activity    Alcohol use: Yes     Comment: wine occasional    Drug use: No    Sexual activity: Not Currently     Birth control/protection: Hysterectomy, Post-menopausal      Past Medical History:   Diagnosis Date    Anemia     Back pain     NUMBNESS/ TINGLINGPAIN DOWN BILAT LOWER EXTREMITIES    Cataract     Chronic kidney disease     STAGE III - FOLLOWED ONLY BY PCP    DDD (degenerative disc disease), lumbar     Female infertility     Gout     Heart murmur     NO PROBLEMS     "Hyperlipidemia     Hypertension     RA (rheumatoid arthritis)     NO PROBLEMS WITH FLEX/ EXTENSION     Past Surgical History:   Procedure Laterality Date    COLONOSCOPY N/A 5/20/2021    Procedure: COLONOSCOPY INTO CECUM WITH COLD BIOPSY POLYPECTOMIES;  Surgeon: Chuy Bronson Jr., MD;  Location: [facility] ENDOSCOPY;  Service: General;  Laterality: N/A;  PRE- SCREENING  POST- POLYPS    EPIDURAL BLOCK      FOOT SURGERY Right     RHEUMATOID BONE SPURS REMOVED, GRAFT FROM KNEE    HYSTERECTOMY      THORACIC DECOMPRESSION POSTERIOR FUSION WITH INSTRUMENTATION N/A 7/19/2023    Procedure: T11-12 LAMINECTOMY FUSION;  Surgeon: Smith Montgomery MD;  Location: [facility] MAIN OR;  Service: Orthopedic Spine;  Laterality: N/A;       Family History   Problem Relation Age of Onset    Breast cancer Neg Hx     Ovarian cancer Neg Hx     Uterine cancer Neg Hx     Colon cancer Neg Hx     Deep vein thrombosis Neg Hx     Pulmonary embolism Neg Hx     Malig Hyperthermia Neg Hx          Review of Systems        Objective:  Vitals:    01/13/25 1032   BP: 155/81   Pulse: 73   Weight: 77.6 kg (171 lb)   Height: 162.6 cm (64.02\")         01/13/25  1032   Weight: 77.6 kg (171 lb)     Body mass index is 29.33 kg/m².  Physical Exam    Vital signs reviewed.   General: No acute distress, alert and oriented  Eyes: conjunctiva clear; pupils equally round and reactive  ENT: external ears and nose atraumatic; oropharynx clear  CV: no peripheral edema  Resp: normal respiratory effort  Skin: no rashes or wounds; normal turgor  Psych: mood and affect appropriate; recent and remote memory intact          Physical Exam  The right shoulder shows active forward flexion of 140 degrees with 3 out of 5 strength, active external rotation of 40 degrees with 3 out of 5 strength, internal rotation to T10, 4+ out of 5 strength and belly press test. Negative bear hug sign, positive empty can test, positive drop arm test, positive external rotation lag sign, positive deltoid " in all 3 components. Moderate tenderness in the anterior and posterior glenohumeral joint line with mild crepitus. Positive Yergason and Speed's tests, equivocal Berwick's test. No tenderness over the AC joint with negative cross arm test. Brisk capillary refill to all digits, 1+ radial pulse in the right wrist. Positive sensation to light touch in all distributions in the right hand, symmetric to the left.         Imaging:  Right Shoulder X-Ray  Indication: Pain  AP, scapular Y, and axillary lateral views    Findings:  No fracture  No bony lesion  Normal soft tissues  Significant humeral head elevation with mild to moderate glenohumeral joint space narrowing consistent with rotator cuff deficiency versus rotator cuff arthropathy, minimal osteophyte formation.    No prior studies were available for comparison.    Assessment:        1. Right shoulder pain, unspecified chronicity    2. Rotator cuff arthropathy of right shoulder           Plan:    - Large Joint Arthrocentesis: R glenohumeral on 1/13/2025 11:08 AM  Indications: pain  Details: 22 G needle, anterior approach  Medications: 80 mg triamcinolone acetonide 40 MG/ML; 4 mL lidocaine PF 1% 1 %  Outcome: tolerated well, no immediate complications  Procedure, treatment alternatives, risks and benefits explained, specific risks discussed. Consent was given by the patient. Immediately prior to procedure a time out was called to verify the correct patient, procedure, equipment, support staff and site/side marked as required. Patient was prepped and draped in the usual sterile fashion.                 Assessment & Plan  1. Right shoulder pain.  The patient reports moderate to severe pain in the right shoulder since a fall on 11/07/2024. The pain is rated 6-8 out of 10, throbbing in nature, and worsens with overhead lifting, pushing, pulling, and positional changes. She experiences associated weakness, giving way, and intermittent swelling. A prior cortisone injection  on 12/19/2024 provided temporary relief for 2 to 3 weeks. Physical examination reveals limited range of motion, reduced strength, and positive tests indicating rotator cuff insufficiency. Imaging shows significant humeral head elevation and early arthropathy.     Advanced imaging and reverse shoulder arthroplasty were discussed but declined due to personal commitments. A glenohumeral injection was proposed and agreed upon to help manage her pain. She will receive a right shoulder glenohumeral injection today. She is advised to continue home exercises for range of motion and strength as tolerated. If symptoms persist or recur, surgical treatment may be considered.    Follow-up  The patient will follow up as needed.    Patient would like to proceed with cortisone injection today to the right shoulder glenohumeral joint. Recommended limited use of affected extremity for the next 24 hours to only essential activites other than work on general active and passive motion. Recommended supplementing with ice and soft tissue massage. Discussed with patient that they should see results in 5-7 days, if no improvement in 5-6 weeks I have asked them to call the office to review other options. Patient should call office immediately if they notice redness, warmth, fevers, chills, or residual numbness or tingling for greater than 6 hours after injection.       Leann Andrew was in agreement with plan and had all questions answered.     Orders:  Orders Placed This Encounter   Procedures    - Large Joint Arthrocentesis: R glenohumeral    XR Shoulder 2+ View Right    Ambulatory Referral to Physical Therapy for Evaluation & Treatment       Medications:  No orders of the defined types were placed in this encounter.      Followup:  No follow-ups on file.    Diagnoses and all orders for this visit:    1. Right shoulder pain, unspecified chronicity (Primary)  -     XR Shoulder 2+ View Right    2. Rotator cuff arthropathy of right  shoulder  -     Ambulatory Referral to Physical Therapy for Evaluation & Treatment    Other orders  -     - Large Joint Arthrocentesis: R glenohumeral                  Dictated utilizing Dragon dictation     Patient or patient representative verbalized consent for the use of Ambient Listening during the visit with  Trev Flores MD for chart documentation. 1/13/2025  10:50 EST

## 2025-01-15 LAB — COPPER SERPL-MCNC: 103 UG/DL (ref 80–158)

## 2025-01-17 ENCOUNTER — PATIENT ROUNDING (BHMG ONLY) (OUTPATIENT)
Dept: ORTHOPEDIC SURGERY | Facility: CLINIC | Age: 70
End: 2025-01-17
Payer: MEDICARE

## 2025-01-17 LAB — CRYOGLOB SER QL 1D COLD INC: NORMAL

## 2025-01-17 NOTE — PROGRESS NOTES
A card has been sent to the patient for PATIENT ROUNDING with [facility].

## 2025-01-19 LAB
ARSENIC BLD-MCNC: 2 UG/L (ref 0–9)
LEAD BLDV-MCNC: <1 UG/DL (ref 0–3.4)
MERCURY BLD-MCNC: <1 UG/L (ref 0–14.9)

## 2025-01-21 ENCOUNTER — HOSPITAL ENCOUNTER (OUTPATIENT)
Dept: PHYSICAL THERAPY | Facility: HOSPITAL | Age: 70
Setting detail: THERAPIES SERIES
Discharge: HOME | End: 2025-01-21
Payer: MEDICARE

## 2025-01-21 DIAGNOSIS — M12.811 ROTATOR CUFF ARTHROPATHY OF RIGHT SHOULDER: Primary | ICD-10-CM

## 2025-01-21 PROCEDURE — 97161 PT EVAL LOW COMPLEX 20 MIN: CPT | Performed by: PHYSICAL THERAPIST

## 2025-01-22 NOTE — THERAPY EVALUATION
Outpatient Physical Therapy Ortho Initial Evaluation   Jos     Patient Name: Leann Andrew  : 1955  MRN: [id number]  Today's Date: 2025      Visit Date: 2025    Patient Active Problem List   Diagnosis    RA (rheumatoid arthritis)    GERD (gastroesophageal reflux disease)    HTN (hypertension)    Hypercholesteremia    DDD (degenerative disc disease), lumbar    Screening for colon cancer    Thoracic spinal stenosis    Chronic kidney disease    Heart murmur    Back pain    Osteopenia    Anemia due to chronic illness    Bilateral cataracts    Long-term use of high-risk medication    Lumbar radiculopathy, acute    Menopause    Benign hypertension with chronic kidney disease, stage III    Degenerative scoliosis in adult patient    Idiopathic neuropathy    Thrombocytopenia        Past Medical History:   Diagnosis Date    Anemia     Back pain     NUMBNESS/ TINGLINGPAIN DOWN BILAT LOWER EXTREMITIES    Cataract     Chronic kidney disease     STAGE III - FOLLOWED ONLY BY PCP    DDD (degenerative disc disease), lumbar     Female infertility     Gout     Heart murmur     NO PROBLEMS    Hyperlipidemia     Hypertension     RA (rheumatoid arthritis)     NO PROBLEMS WITH FLEX/ EXTENSION        Past Surgical History:   Procedure Laterality Date    COLONOSCOPY N/A 2021    Procedure: COLONOSCOPY INTO CECUM WITH COLD BIOPSY POLYPECTOMIES;  Surgeon: Chuy Bronson Jr., MD;  Location: [facility] ENDOSCOPY;  Service: General;  Laterality: N/A;  PRE- SCREENING  POST- POLYPS    EPIDURAL BLOCK      FOOT SURGERY Right     RHEUMATOID BONE SPURS REMOVED, GRAFT FROM KNEE    HYSTERECTOMY      THORACIC DECOMPRESSION POSTERIOR FUSION WITH INSTRUMENTATION N/A 2023    Procedure: T11-12 LAMINECTOMY FUSION;  Surgeon: Smith Montgomery MD;  Location: [facility] MAIN OR;  Service: Orthopedic Spine;  Laterality: N/A;       Visit Dx:     ICD-10-CM ICD-9-CM   1. Rotator cuff arthropathy of right shoulder  M12.811 716.81           Patient History       Row Name 01/21/25 0830             History    Chief Complaint Difficulty with daily activities;Pain;Muscle weakness  -      Type of Pain Shoulder pain  right  -GC      Date Current Problem(s) Began 11/07/24  -      Brief Description of Current Complaint Pt states she injured her right shoulder 11/7/2024 when she fell landing on her shoulder. She did not seek any medical attention at that time, but her pain persisted and it became more difficult to use/move her right shoulder. She followed up with Dr. Flores who did x-rays which were normal. He injected the patient's shoulder which she says has helped decreased her pain. She is now referred for therapy.  -      Patient/Caregiver Goals Relieve pain;Return to prior level of function;Improve mobility;Improve strength  -      Hand Dominance right-handed  -      What clinical tests have you had for this problem? X-ray  -      Results of Clinical Tests negative  -         Pain     Pain Location Shoulder  right  -GC      Pain at Present 2  -GC      Pain at Best 1  -GC      Pain at Worst 6;7  -GC      Pain Frequency Constant/continuous  -      Pain Description Aching;Discomfort;Sore  -      What Performance Factors Make the Current Problem(s) WORSE? Pt c/o pain when she tries to reach or lift with her right arm  -      What Performance Factors Make the Current Problem(s) BETTER? Pt feels best at rest  -      Difficulties with ADL's? Pt has difficulty with reaching high with ADLs, fixing her hair, dressing  -         Daily Activities    Primary Language English  -      Are you able to read Yes  -GC      Are you able to write Yes  -GC      How does patient learn best? Reading  -      Teaching needs identified Home Exercise Program;Management of Condition  -      Patient is concerned about/has problems with Difficulty with self care (i.e. bathing, dressing, toileting:;Grasping objects lifting;Performing home  management (household chores, shopping, care of dependents);Performing job responsibilities/community activities (work, school,;Reaching over head;Repetitive movements of the hand, arm, shoulder  -GC      Does patient have problems with the following? None  -GC      Barriers to learning None  -GC      Functional Status bathing;dressing;grooming;mobility issues preventing performance of daily activities  -GC      Pt Participated in POC and Goals Yes  -GC         Safety    Are you being hurt, hit, or frightened by anyone at home or in your life? No  -GC      Are you being neglected by a caregiver No  -GC                User Key  (r) = Recorded By, (t) = Taken By, (c) = Cosigned By      Initials Name Provider Type    GC Neil Hernandez, PT Physical Therapist                     PT Ortho       Row Name 01/21/25 6367       Posture/Observations    Posture/Observations Comments Pt has mild forward head and rounded shoulder posture. She has mild deltoid and periscapular atrophy.  -GC       Shoulder Girdle Accessory Motions    Posterior glide of humerus Right:;WNL  -GC    Anterior glide of humerus Right:;WNL  -GC    Inferior glide of humerus Right:;WNL  -GC       Shoulder Impingement/Rotator Cuff Special Tests    Tucker-Kings Test (RC Lesion vs. Bursitis) Right:;Positive  -GC    Neer Impingement Test (RC Lesion vs. Bursitis) Right:;Positive  -GC    Drop Arm Test (Full Thickness RC Lesion) Right:;Positive  -GC       Shoulder Laxity/Instability Special Tests    Load and Shift Test Right:;Negative  -GC    Sulcus Sign, 0 Degrees Right:;Negative  -GC    Anterior Apprehension/Relocation Test, at 90 Degrees Right:;Negative  -GC       Shoulder Girdle Palpation    Subacromial Space Right:;Tender  -GC    Supraspinatus Insertion Right:;Tender  -GC    Greater Tubercule Right:;Tender  -GC       Right Upper Ext    Rt Shoulder Abduction AROM 150 degrees  -GC    Rt Shoulder Abduction PROM WFL  -GC    Rt Shoulder Flexion AROM 66 degrees   -GC    Rt Shoulder Flexion PROM WFL  -GC    Rt Shoulder External Rotation AROM 83 degrees  -GC    Rt Shoulder Internal Rotation AROM 72 degrees  -GC       MMT (Manual Muscle Testing)    General MMT Comments scaption strength is 2+/5  -       MMT Right Upper Ext    Rt Shoulder Flexion MMT, Gross Movement (2+/5) poor plus  -GC    Rt Shoulder Extension MMT, Gross Movement (4/5) good  -GC    Rt Shoulder ABduction MMT, Gross Movement (2+/5) poor plus  -GC    Rt Shoulder ADduction MMT, Gross Movement (4+/5) good plus  -GC    Rt Shoulder Internal Rotation MMT, Gross Movement (3/5) fair  -GC    Rt Shoulder External Rotation MMT, Gross Movement (2+/5) poor plus  -GC       Sensation    Light Touch No apparent deficits  -              User Key  (r) = Recorded By, (t) = Taken By, (c) = Cosigned By      Initials Name Provider Type    Neil Garza, PT Physical Therapist                                Therapy Education  Given: HEP, Symptoms/condition management, Pain management  Program: New  How Provided: Verbal, Demonstration, Written  Provided to: Patient  Level of Understanding: Teach back education performed, Verbalized, Demonstrated      PT OP Goals       Row Name 01/21/25 0830          PT Short Term Goals    STG Date to Achieve 02/18/25  -     STG 1 Decrease right shoulder pain to 3-4/10 with activity.  -     STG 2 Increase AROM of righ shoulder to 130 degree FLEX with testing.  -     STG 3 Increase right shoulder girdle strength to at least 3/5 all planes with testing.  -     STG 4 Pt will be independent with her HEP issued by this therapist.  -        Long Term Goals    LTG Date to Achieve 03/18/25  -     LTG 1 Decrease right shoulder pain to 0-1/10 with activity.  -     LTG 2 Increase AROM of righ shoulder to 150 degree FLEX with testing.  -     LTG 3 Increase right shoulder girdle strength to at least 4/5 all planes with testing.  -     LTG 4 Pt will be independent with all ADLs without pain.   -GC        Time Calculation    PT Goal Re-Cert Due Date 02/18/25  -GC               User Key  (r) = Recorded By, (t) = Taken By, (c) = Cosigned By      Initials Name Provider Type     Neil Hernandez, PT Physical Therapist                     PT Assessment/Plan       Row Name 01/21/25 0830          PT Assessment    Functional Limitations Limitation in home management;Limitations in community activities;Limitations in functional capacity and performance;Performance in leisure activities;Performance in self-care ADL  -GC     Impairments Range of motion;Pain;Muscle strength  -     Assessment Comments Pt presents approximately 10 week ss/p fall with right shoulder injury. She rates her pain up to 6-7/10 with activities that involve reaching or lifting with her right UE. She has decreased right shoulder ROM, decreased right shoulder girdle strength, and decreased function secondary to the above.  -GC     Rehab Potential Fair  -GC     Patient/caregiver participated in establishment of treatment plan and goals Yes  -GC     Patient would benefit from skilled therapy intervention Yes  -GC        PT Plan    PT Frequency 1x/week;2x/week  -     Predicted Duration of Therapy Intervention (PT) 8 weeks  -     Planned CPT's? PT EVAL LOW COMPLEXITY: 37881;PT THER PROC EA 15 MIN: 50328;PT MANUAL THERAPY EA 15 MIN: 43436;PT HOT OR COLD PACK TREAT MCARE  -     PT Plan Comments Pt is to continue her HEP 2x daily  -               User Key  (r) = Recorded By, (t) = Taken By, (c) = Cosigned By      Initials Name Provider Type     Neil Hernandez PT Physical Therapist                     Modalities       Row Name 01/21/25 0830             Moist Heat    MH Applied Yes  -GC      Location right shoulder  -      PT Moist Heat Minutes 10  -GC      MH Prior to Rx Yes  -GC                User Key  (r) = Recorded By, (t) = Taken By, (c) = Cosigned By      Initials Name Provider Type    Neil Garza, PT Physical Therapist                    OP Exercises       Row Name 01/21/25 0830             Exercise 1    Exercise Name 1 sidelying shoulder ER  -GC      Reps 1 25  -GC         Exercise 2    Exercise Name 2 Scpation up  -GC      Reps 2 25  -GC         Exercise 3    Exercise Name 3 Scaption down  -GC      Reps 3 25  -GC         Exercise 4    Exercise Name 4 Shoulder ER vs theraband  -GC      Reps 4 25  -GC      Time 4 yellow  -GC         Exercise 5    Exercise Name 5 Shoulder IR vs theraband  -GC      Reps 5 25  -GC      Time 5 yellow  -GC         Exercise 6    Exercise Name 6 Shoulder EXT vs theraband  -GC      Reps 6 25  -GC      Time 6 blue  -GC         Exercise 7    Exercise Name 7 Shoulder Rows vs theraband  -GC      Reps 7 25  -GC      Time 7 black  -GC                User Key  (r) = Recorded By, (t) = Taken By, (c) = Cosigned By      Initials Name Provider Type    Neil Garza PT Physical Therapist                                  Outcome Measure Options: Quick DASH  Quick DASH  Open a tight or new jar.: Mild Difficulty  Do heavy household chores (e.g., wash walls, wash floors): Moderate Difficulty  Carry a shopping bag or briefcase: Moderate Difficulty  Wash your back: Moderate Difficulty  Use a knife to cut food: Moderate Difficulty  Recreational activities in which you take some force or impact through your arm, should or hand (e.g. golf, hammering, tennis, etc.): Unable  During the past week, to what extent has your arm, shoulder, or hand problem interfered with your normal social activites with family, friends, neighbors or groups?: Slightly  During the past week, were you limited in your work or other regular daily activities as a result of your arm, shoulder or hand problem?: Moderately Limited  Arm, Shoulder, or hand pain: Moderate  Tingling (pins and needles) in your arm, shoulder, or hand: None  During the past week, how much difficulty have you had sleeping because of the pain in your arm, shoulder or hand?: No  difficulty  Number of Questions Answered: 11  Quick DASH Score: 40.91         Time Calculation:     Start Time: 0830  Stop Time: 0930  Time Calculation (min): 60 min  Untimed Charges  PT Moist Heat Minutes: 10  Total Minutes  Untimed Charges Total Minutes: 10   Total Minutes: 10     Therapy Charges for Today       Code Description Service Date Service Provider Modifiers Qty    [id number] HC PT EVAL LOW COMPLEXITY 3 1/21/2025 Neil Hernandez, PT GP 1            PT G-Codes  Outcome Measure Options: Quick DASH  Quick DASH Score: 40.91         Neil Hernandez, PT  1/22/2025

## 2025-01-29 ENCOUNTER — HOSPITAL ENCOUNTER (OUTPATIENT)
Dept: PHYSICAL THERAPY | Facility: HOSPITAL | Age: 70
Setting detail: THERAPIES SERIES
Discharge: HOME | End: 2025-01-29
Payer: MEDICARE

## 2025-01-29 DIAGNOSIS — M12.811 ROTATOR CUFF ARTHROPATHY OF RIGHT SHOULDER: Primary | ICD-10-CM

## 2025-01-29 PROCEDURE — 97110 THERAPEUTIC EXERCISES: CPT | Performed by: PHYSICAL THERAPIST

## 2025-01-29 NOTE — THERAPY TREATMENT NOTE
Outpatient Physical Therapy Ortho Treatment Note   Megan     Patient Name: Leann Andrew  : 1955  MRN: [id number]  Today's Date: 2025      Visit Date: 2025    Visit Dx:    ICD-10-CM ICD-9-CM   1. Rotator cuff arthropathy of right shoulder  M12.811 716.81       Patient Active Problem List   Diagnosis    RA (rheumatoid arthritis)    GERD (gastroesophageal reflux disease)    HTN (hypertension)    Hypercholesteremia    DDD (degenerative disc disease), lumbar    Screening for colon cancer    Thoracic spinal stenosis    Chronic kidney disease    Heart murmur    Back pain    Osteopenia    Anemia due to chronic illness    Bilateral cataracts    Long-term use of high-risk medication    Lumbar radiculopathy, acute    Menopause    Benign hypertension with chronic kidney disease, stage III    Degenerative scoliosis in adult patient    Idiopathic neuropathy    Thrombocytopenia        Past Medical History:   Diagnosis Date    Anemia     Back pain     NUMBNESS/ TINGLINGPAIN DOWN BILAT LOWER EXTREMITIES    Cataract     Chronic kidney disease     STAGE III - FOLLOWED ONLY BY PCP    DDD (degenerative disc disease), lumbar     Female infertility     Gout     Heart murmur     NO PROBLEMS    Hyperlipidemia     Hypertension     RA (rheumatoid arthritis)     NO PROBLEMS WITH FLEX/ EXTENSION        Past Surgical History:   Procedure Laterality Date    COLONOSCOPY N/A 2021    Procedure: COLONOSCOPY INTO CECUM WITH COLD BIOPSY POLYPECTOMIES;  Surgeon: Chuy Bronson Jr., MD;  Location: [facility] ENDOSCOPY;  Service: General;  Laterality: N/A;  PRE- SCREENING  POST- POLYPS    EPIDURAL BLOCK      FOOT SURGERY Right     RHEUMATOID BONE SPURS REMOVED, GRAFT FROM KNEE    HYSTERECTOMY      THORACIC DECOMPRESSION POSTERIOR FUSION WITH INSTRUMENTATION N/A 2023    Procedure: T11-12 LAMINECTOMY FUSION;  Surgeon: Smith Montgomery MD;  Location: [facility] MAIN OR;  Service: Orthopedic Spine;  Laterality: N/A;                         PT Assessment/Plan       Row Name 01/29/25 1155          PT Assessment    Assessment Comments Pt is showing increased AROM with her exercises. She tolerated her exercise progression well.  -GC        PT Plan    PT Plan Comments Pt is to continue her HEP 2x daily. Will re-ck again next week.  -GC               User Key  (r) = Recorded By, (t) = Taken By, (c) = Cosigned By      Initials Name Provider Type     Neil Hernandez, PT Physical Therapist                       OP Exercises       Row Name 01/29/25 1155             Subjective    Subjective Comments Pt thinks she can see a little improvement.  -GC         Exercise 1    Exercise Name 1 sidelying shoulder ER  -GC      Reps 1 30  -GC         Exercise 2    Exercise Name 2 Scaption up  -GC      Reps 2 30  -GC         Exercise 3    Exercise Name 3 Scaption down  -GC      Reps 3 30  -GC         Exercise 4    Exercise Name 4 Shoulder ER vs theraband  -GC      Reps 4 30  -GC      Time 4 yellow  -GC         Exercise 5    Exercise Name 5 Shoulder IR vs theraband  -GC      Reps 5 25  -GC      Time 5 yellow  -GC         Exercise 6    Exercise Name 6 Shoulder EXT vs theraband  -GC      Reps 6 25  -GC      Time 6 blue  -GC         Exercise 7    Exercise Name 7 Shoulder Rows vs theraband  -GC      Reps 7 25  -GC      Time 7 black  -GC         Exercise 8    Exercise Name 8 bent over Ts and Ys  -GC      Reps 8 30  -GC                User Key  (r) = Recorded By, (t) = Taken By, (c) = Cosigned By      Initials Name Provider Type     Neil Hernandez, PT Physical Therapist                                                    Time Calculation:   Start Time: 1155  Stop Time: 1238  Time Calculation (min): 43 min  Therapy Charges for Today       Code Description Service Date Service Provider Modifiers Qty    [id number]  PT THER PROC EA 15 MIN 1/29/2025 Neil Hernandez, PT GP 2                      Neil Hernandez PT  1/29/2025

## 2025-02-04 ENCOUNTER — HOSPITAL ENCOUNTER (OUTPATIENT)
Dept: PHYSICAL THERAPY | Facility: HOSPITAL | Age: 70
Setting detail: THERAPIES SERIES
Discharge: HOME | End: 2025-02-04
Payer: MEDICARE

## 2025-02-04 DIAGNOSIS — M12.811 ROTATOR CUFF ARTHROPATHY OF RIGHT SHOULDER: Primary | ICD-10-CM

## 2025-02-04 PROCEDURE — 97110 THERAPEUTIC EXERCISES: CPT | Performed by: PHYSICAL THERAPIST

## 2025-02-04 NOTE — THERAPY TREATMENT NOTE
Outpatient Physical Therapy Ortho Treatment Note   Megan     Patient Name: Leann Andrew  : 1955  MRN: [id number]  Today's Date: 2025      Visit Date: 2025    Visit Dx:    ICD-10-CM ICD-9-CM   1. Rotator cuff arthropathy of right shoulder  M12.811 716.81       Patient Active Problem List   Diagnosis    RA (rheumatoid arthritis)    GERD (gastroesophageal reflux disease)    HTN (hypertension)    Hypercholesteremia    DDD (degenerative disc disease), lumbar    Screening for colon cancer    Thoracic spinal stenosis    Chronic kidney disease    Heart murmur    Back pain    Osteopenia    Anemia due to chronic illness    Bilateral cataracts    Long-term use of high-risk medication    Lumbar radiculopathy, acute    Menopause    Benign hypertension with chronic kidney disease, stage III    Degenerative scoliosis in adult patient    Idiopathic neuropathy    Thrombocytopenia        Past Medical History:   Diagnosis Date    Anemia     Back pain     NUMBNESS/ TINGLINGPAIN DOWN BILAT LOWER EXTREMITIES    Cataract     Chronic kidney disease     STAGE III - FOLLOWED ONLY BY PCP    DDD (degenerative disc disease), lumbar     Female infertility     Gout     Heart murmur     NO PROBLEMS    Hyperlipidemia     Hypertension     RA (rheumatoid arthritis)     NO PROBLEMS WITH FLEX/ EXTENSION        Past Surgical History:   Procedure Laterality Date    COLONOSCOPY N/A 2021    Procedure: COLONOSCOPY INTO CECUM WITH COLD BIOPSY POLYPECTOMIES;  Surgeon: Chuy Bronson Jr., MD;  Location: [facility] ENDOSCOPY;  Service: General;  Laterality: N/A;  PRE- SCREENING  POST- POLYPS    EPIDURAL BLOCK      FOOT SURGERY Right     RHEUMATOID BONE SPURS REMOVED, GRAFT FROM KNEE    HYSTERECTOMY      THORACIC DECOMPRESSION POSTERIOR FUSION WITH INSTRUMENTATION N/A 2023    Procedure: T11-12 LAMINECTOMY FUSION;  Surgeon: Smith Montgomery MD;  Location: [facility] MAIN OR;  Service: Orthopedic Spine;  Laterality: N/A;                         PT Assessment/Plan       Row Name 02/04/25 1100          PT Assessment    Assessment Comments Pt continues to show increasing shoulder range and improved function.  -GC        PT Plan    PT Plan Comments Pt is to continue her HEP 2x daily.  -GC               User Key  (r) = Recorded By, (t) = Taken By, (c) = Cosigned By      Initials Name Provider Type    GC Neil Hernandez, PT Physical Therapist                     Modalities       Row Name 02/04/25 1100             Moist Heat    MH Applied Yes  -GC      Location right shoulder  -GC      PT Moist Heat Minutes 10  -GC      MH Prior to Rx Yes  -GC         Functional Testing    Outcome Measure Options Quick DASH  -GC                User Key  (r) = Recorded By, (t) = Taken By, (c) = Cosigned By      Initials Name Provider Type    GC Neil Hernandez, PT Physical Therapist                   OP Exercises       Row Name 02/04/25 1100             Subjective    Subjective Comments Pt states her shoulder is doing okay.  -GC         Exercise 1    Exercise Name 1 sidelying shoulder ER  -GC      Reps 1 25  -GC      Time 1 1#  -GC         Exercise 2    Exercise Name 2 Scaption up  -GC      Reps 2 30  -GC         Exercise 3    Exercise Name 3 Scaption down  -GC      Reps 3 30  -GC         Exercise 4    Exercise Name 4 Shoulder ER vs theraband  -GC      Reps 4 25  -GC      Time 4 red  -GC         Exercise 5    Exercise Name 5 Shoulder IR vs theraband  -GC      Reps 5 25  -GC      Time 5 black  -GC         Exercise 6    Exercise Name 6 Shoulder EXT vs theraband  -GC      Reps 6 25  -GC      Time 6 black  -GC         Exercise 7    Exercise Name 7 Shoulder Rows vs theraband  -GC      Reps 7 25  -GC      Time 7 black  -GC         Exercise 8    Exercise Name 8 bent over Ts and Ys  -GC      Reps 8 25  -GC      Time 8 1#  -GC         Exercise 9    Exercise Name 9 shoulder FLEX vs theraband  -GC      Reps 9 25  -GC      Time 9 yellow  -GC                User Key  (r) =  Recorded By, (t) = Taken By, (c) = Cosigned By      Initials Name Provider Type    GC Neil Hernandez, PT Physical Therapist                                          Outcome Measure Options: Quick DASH         Time Calculation:   Start Time: 1100  Stop Time: 1143  Time Calculation (min): 43 min  Untimed Charges  PT Moist Heat Minutes: 10  Total Minutes  Untimed Charges Total Minutes: 10   Total Minutes: 10  Therapy Charges for Today       Code Description Service Date Service Provider Modifiers Qty    09068609349 HC PT THER PROC EA 15 MIN 2/4/2025 Neil Hernandez, PT GP 1            PT G-Codes  Outcome Measure Options: Quick DES Hernandez PT  2/4/2025

## 2025-02-11 ENCOUNTER — HOSPITAL ENCOUNTER (OUTPATIENT)
Dept: PHYSICAL THERAPY | Facility: HOSPITAL | Age: 70
Setting detail: THERAPIES SERIES
Discharge: HOME | End: 2025-02-11
Payer: MEDICARE

## 2025-02-11 DIAGNOSIS — M12.811 ROTATOR CUFF ARTHROPATHY OF RIGHT SHOULDER: Primary | ICD-10-CM

## 2025-02-11 PROCEDURE — 97110 THERAPEUTIC EXERCISES: CPT | Performed by: PHYSICAL THERAPIST

## 2025-02-11 NOTE — THERAPY TREATMENT NOTE
Outpatient Physical Therapy Ortho Treatment Note   Megan     Patient Name: Leann Andrew  : 1955  MRN: [id number]  Today's Date: 2025      Visit Date: 2025    Visit Dx:    ICD-10-CM ICD-9-CM   1. Rotator cuff arthropathy of right shoulder  M12.811 716.81       Patient Active Problem List   Diagnosis    RA (rheumatoid arthritis)    GERD (gastroesophageal reflux disease)    HTN (hypertension)    Hypercholesteremia    DDD (degenerative disc disease), lumbar    Screening for colon cancer    Thoracic spinal stenosis    Chronic kidney disease    Heart murmur    Back pain    Osteopenia    Anemia due to chronic illness    Bilateral cataracts    Long-term use of high-risk medication    Lumbar radiculopathy, acute    Menopause    Benign hypertension with chronic kidney disease, stage III    Degenerative scoliosis in adult patient    Idiopathic neuropathy    Thrombocytopenia        Past Medical History:   Diagnosis Date    Anemia     Back pain     NUMBNESS/ TINGLINGPAIN DOWN BILAT LOWER EXTREMITIES    Cataract     Chronic kidney disease     STAGE III - FOLLOWED ONLY BY PCP    DDD (degenerative disc disease), lumbar     Female infertility     Gout     Heart murmur     NO PROBLEMS    Hyperlipidemia     Hypertension     RA (rheumatoid arthritis)     NO PROBLEMS WITH FLEX/ EXTENSION        Past Surgical History:   Procedure Laterality Date    COLONOSCOPY N/A 2021    Procedure: COLONOSCOPY INTO CECUM WITH COLD BIOPSY POLYPECTOMIES;  Surgeon: Chuy Bronson Jr., MD;  Location: [facility] ENDOSCOPY;  Service: General;  Laterality: N/A;  PRE- SCREENING  POST- POLYPS    EPIDURAL BLOCK      FOOT SURGERY Right     RHEUMATOID BONE SPURS REMOVED, GRAFT FROM KNEE    HYSTERECTOMY      THORACIC DECOMPRESSION POSTERIOR FUSION WITH INSTRUMENTATION N/A 2023    Procedure: T11-12 LAMINECTOMY FUSION;  Surgeon: Smith Montgomery MD;  Location: [facility] MAIN OR;  Service: Orthopedic Spine;  Laterality: N/A;        PT  Ortho       Row Name 02/11/25 1025       Right Upper Ext    Rt Shoulder Abduction AROM 150 degrees  -GC    Rt Shoulder Flexion AROM 145 degrees  -GC              User Key  (r) = Recorded By, (t) = Taken By, (c) = Cosigned By      Initials Name Provider Type    Neil Garza, PT Physical Therapist                                 PT Assessment/Plan       Row Name 02/11/25 1025          PT Assessment    Assessment Comments Pt is showing increased AROM and improving function.  -GC        PT Plan    PT Plan Comments Pt is to continue her HEP 2x daily.  -GC               User Key  (r) = Recorded By, (t) = Taken By, (c) = Cosigned By      Initials Name Provider Type    Neil Garza, PT Physical Therapist                     Modalities       Row Name 02/11/25 1025             Subjective    Subjective Comments Pt states her shoulder is feeling okay.  -GC         Moist Heat    MH Applied Yes  -GC      Location right shoulder  -GC      PT Moist Heat Minutes 10  -GC      MH Prior to Rx Yes  -GC         Functional Testing    Outcome Measure Options Quick DASH  -GC                User Key  (r) = Recorded By, (t) = Taken By, (c) = Cosigned By      Initials Name Provider Type    Neil Garza, PT Physical Therapist                   OP Exercises       Row Name 02/11/25 1025             Subjective    Subjective Comments Pt states her shoulder is feeling okay.  -GC         Exercise 1    Exercise Name 1 sidelying shoulder ER  -GC      Reps 1 25  -GC      Time 1 1#  -GC         Exercise 2    Exercise Name 2 Scaption up  -GC      Reps 2 35  -GC         Exercise 3    Exercise Name 3 Scaption down  -GC      Reps 3 35  -GC         Exercise 4    Exercise Name 4 Shoulder ER vs theraband  -GC      Reps 4 25  -GC      Time 4 red  -GC         Exercise 5    Exercise Name 5 Shoulder IR vs theraband  -GC      Reps 5 25  -GC      Time 5 black  -GC         Exercise 6    Exercise Name 6 Shoulder EXT vs theraband  -GC      Reps 6 25  -GC       Time 6 silver  -GC         Exercise 7    Exercise Name 7 Shoulder Rows vs theraband  -GC      Reps 7 25  -GC      Time 7 silver  -GC         Exercise 8    Exercise Name 8 bent over Ts and Ys  -GC      Reps 8 25  -GC      Time 8 2#  -GC         Exercise 9    Exercise Name 9 shoulder FLEX vs theraband  -GC      Reps 9 25  -GC      Time 9 red  -GC                User Key  (r) = Recorded By, (t) = Taken By, (c) = Cosigned By      Initials Name Provider Type     Neil Hernnadez, PT Physical Therapist                                          Outcome Measure Options: Quick DASH         Time Calculation:   Start Time: 1025  Stop Time: 1102  Time Calculation (min): 37 min  Untimed Charges  PT Moist Heat Minutes: 10  Total Minutes  Untimed Charges Total Minutes: 10   Total Minutes: 10  Therapy Charges for Today       Code Description Service Date Service Provider Modifiers Qty    00047140894 HC PT THER PROC EA 15 MIN 2/11/2025 Neil Hernandez, PT GP 1            PT G-Codes  Outcome Measure Options: Rosalia Hernandez PT  2/11/2025

## 2025-02-12 DIAGNOSIS — R20.0 NUMBNESS AND TINGLING OF BOTH LEGS: ICD-10-CM

## 2025-02-12 DIAGNOSIS — R20.2 NUMBNESS AND TINGLING OF BOTH LEGS: ICD-10-CM

## 2025-02-12 DIAGNOSIS — G95.9 MYELOPATHY: ICD-10-CM

## 2025-02-12 DIAGNOSIS — G62.9 POLYNEUROPATHY: ICD-10-CM

## 2025-02-12 RX ORDER — BACLOFEN 10 MG/1
10 TABLET ORAL NIGHTLY
Qty: 30 TABLET | Refills: 2 | Status: SHIPPED | OUTPATIENT
Start: 2025-02-12

## 2025-02-19 ENCOUNTER — APPOINTMENT (OUTPATIENT)
Dept: PHYSICAL THERAPY | Facility: HOSPITAL | Age: 70
End: 2025-02-19
Payer: MEDICARE

## 2025-02-26 ENCOUNTER — HOSPITAL ENCOUNTER (OUTPATIENT)
Dept: PHYSICAL THERAPY | Facility: HOSPITAL | Age: 70
Setting detail: THERAPIES SERIES
Discharge: HOME | End: 2025-02-26
Payer: MEDICARE

## 2025-02-26 DIAGNOSIS — M12.811 ROTATOR CUFF ARTHROPATHY OF RIGHT SHOULDER: Primary | ICD-10-CM

## 2025-02-26 PROCEDURE — 97110 THERAPEUTIC EXERCISES: CPT | Performed by: PHYSICAL THERAPIST

## 2025-02-26 NOTE — THERAPY TREATMENT NOTE
Outpatient Physical Therapy Ortho Treatment Note   Megan     Patient Name: Leann Andrew  : 1955  MRN: [id number]  Today's Date: 2025      Visit Date: 2025    Visit Dx:    ICD-10-CM ICD-9-CM   1. Rotator cuff arthropathy of right shoulder  M12.811 716.81       Patient Active Problem List   Diagnosis    RA (rheumatoid arthritis)    GERD (gastroesophageal reflux disease)    HTN (hypertension)    Hypercholesteremia    DDD (degenerative disc disease), lumbar    Screening for colon cancer    Thoracic spinal stenosis    Chronic kidney disease    Heart murmur    Back pain    Osteopenia    Anemia due to chronic illness    Bilateral cataracts    Long-term use of high-risk medication    Lumbar radiculopathy, acute    Menopause    Benign hypertension with chronic kidney disease, stage III    Degenerative scoliosis in adult patient    Idiopathic neuropathy    Thrombocytopenia        Past Medical History:   Diagnosis Date    Anemia     Back pain     NUMBNESS/ TINGLINGPAIN DOWN BILAT LOWER EXTREMITIES    Cataract     Chronic kidney disease     STAGE III - FOLLOWED ONLY BY PCP    DDD (degenerative disc disease), lumbar     Female infertility     Gout     Heart murmur     NO PROBLEMS    Hyperlipidemia     Hypertension     RA (rheumatoid arthritis)     NO PROBLEMS WITH FLEX/ EXTENSION        Past Surgical History:   Procedure Laterality Date    COLONOSCOPY N/A 2021    Procedure: COLONOSCOPY INTO CECUM WITH COLD BIOPSY POLYPECTOMIES;  Surgeon: Chuy Bronson Jr., MD;  Location: [facility] ENDOSCOPY;  Service: General;  Laterality: N/A;  PRE- SCREENING  POST- POLYPS    EPIDURAL BLOCK      FOOT SURGERY Right     RHEUMATOID BONE SPURS REMOVED, GRAFT FROM KNEE    HYSTERECTOMY      THORACIC DECOMPRESSION POSTERIOR FUSION WITH INSTRUMENTATION N/A 2023    Procedure: T11-12 LAMINECTOMY FUSION;  Surgeon: Smith Montgomery MD;  Location: [facility] MAIN OR;  Service: Orthopedic Spine;  Laterality: N/A;        PT  Ortho       Row Name 02/26/25 1030       MMT (Manual Muscle Testing)    General MMT Comments scaption strength is 3/5  -GC       MMT Right Upper Ext    Rt Shoulder Flexion MMT, Gross Movement (3/5) fair  -GC    Rt Shoulder ABduction MMT, Gross Movement (3/5) fair  -GC    Rt Shoulder External Rotation MMT, Gross Movement (3/5) fair  -GC              User Key  (r) = Recorded By, (t) = Taken By, (c) = Cosigned By      Initials Name Provider Type    GC Neil Hernandez, PT Physical Therapist                                 PT Assessment/Plan       Row Name 02/26/25 1030          PT Assessment    Assessment Comments Pt is doing well improving function and good tolerance to her exercise porgression.  -GC        PT Plan    PT Plan Comments Pt is to continue her HEP daily. Will re-ck again in 2 weeks.  -GC               User Key  (r) = Recorded By, (t) = Taken By, (c) = Cosigned By      Initials Name Provider Type    GC Neil Hernandez, PT Physical Therapist                     Modalities       Row Name 02/26/25 1030             Moist Heat    MH Applied Yes  -GC      Location right shoulder  -GC      PT Moist Heat Minutes 10  -GC      MH Prior to Rx Yes  -GC         Functional Testing    Outcome Measure Options Quick DASH  -GC                User Key  (r) = Recorded By, (t) = Taken By, (c) = Cosigned By      Initials Name Provider Type     Neil Hernandez, PT Physical Therapist                   OP Exercises       Row Name 02/26/25 1030             Subjective    Subjective Comments Pt states her shoulder is sore, but doing better.  -GC         Exercise 1    Exercise Name 1 sidelying shoulder ER  -GC      Reps 1 25  -GC      Time 1 1#  -GC         Exercise 2    Exercise Name 2 Scaption up  -GC      Reps 2 35  -GC         Exercise 3    Exercise Name 3 Scaption down  -GC      Reps 3 25  -GC      Time 3 1#  -GC         Exercise 4    Exercise Name 4 Shoulder ER vs theraband  -GC      Reps 4 25  -GC      Time 4 green  -GC          Exercise 5    Exercise Name 5 Shoulder IR vs theraband  -GC      Reps 5 25  -GC      Time 5 silver  -GC         Exercise 6    Exercise Name 6 Shoulder EXT vs theraband  -GC      Reps 6 25  -GC      Time 6 silver  -GC         Exercise 7    Exercise Name 7 Shoulder Rows vs theraband  -GC      Reps 7 25  -GC      Time 7 silver  -GC         Exercise 8    Exercise Name 8 bent over Ts and Ys  -GC      Reps 8 25  -GC      Time 8 2#  -GC         Exercise 9    Exercise Name 9 shoulder FLEX vs theraband  -GC      Reps 9 25  -GC      Time 9 green  -GC                User Key  (r) = Recorded By, (t) = Taken By, (c) = Cosigned By      Initials Name Provider Type    GC Neil Hernandez, PT Physical Therapist                                          Outcome Measure Options: Quick DASH         Time Calculation:   Start Time: 1030  Stop Time: 1104  Time Calculation (min): 34 min  Untimed Charges  PT Moist Heat Minutes: 10  Total Minutes  Untimed Charges Total Minutes: 10   Total Minutes: 10  Therapy Charges for Today       Code Description Service Date Service Provider Modifiers Qty    85800710861 HC PT THER PROC EA 15 MIN 2/26/2025 Neil Hernandez, PT GP 1            PT G-Codes  Outcome Measure Options: Quick DSE Hernandez PT  2/26/2025

## 2025-03-13 ENCOUNTER — HOSPITAL ENCOUNTER (OUTPATIENT)
Dept: PHYSICAL THERAPY | Facility: HOSPITAL | Age: 70
Setting detail: THERAPIES SERIES
Discharge: HOME | End: 2025-03-13
Payer: MEDICARE

## 2025-03-13 DIAGNOSIS — M12.811 ROTATOR CUFF ARTHROPATHY OF RIGHT SHOULDER: Primary | ICD-10-CM

## 2025-03-13 PROCEDURE — 97110 THERAPEUTIC EXERCISES: CPT | Performed by: PHYSICAL THERAPIST

## 2025-03-13 NOTE — THERAPY TREATMENT NOTE
Outpatient Physical Therapy Ortho Treatment Note   Megan     Patient Name: Leann Andrew  : 1955  MRN: [id number]  Today's Date: 3/13/2025      Visit Date: 2025    Visit Dx:    ICD-10-CM ICD-9-CM   1. Rotator cuff arthropathy of right shoulder  M12.811 716.81       Patient Active Problem List   Diagnosis    RA (rheumatoid arthritis)    GERD (gastroesophageal reflux disease)    HTN (hypertension)    Hypercholesteremia    DDD (degenerative disc disease), lumbar    Screening for colon cancer    Thoracic spinal stenosis    Chronic kidney disease    Heart murmur    Back pain    Osteopenia    Anemia due to chronic illness    Bilateral cataracts    Long-term use of high-risk medication    Lumbar radiculopathy, acute    Menopause    Benign hypertension with chronic kidney disease, stage III    Degenerative scoliosis in adult patient    Idiopathic neuropathy    Thrombocytopenia        Past Medical History:   Diagnosis Date    Anemia     Back pain     NUMBNESS/ TINGLINGPAIN DOWN BILAT LOWER EXTREMITIES    Cataract     Chronic kidney disease     STAGE III - FOLLOWED ONLY BY PCP    DDD (degenerative disc disease), lumbar     Female infertility     Gout     Heart murmur     NO PROBLEMS    Hyperlipidemia     Hypertension     RA (rheumatoid arthritis)     NO PROBLEMS WITH FLEX/ EXTENSION        Past Surgical History:   Procedure Laterality Date    COLONOSCOPY N/A 2021    Procedure: COLONOSCOPY INTO CECUM WITH COLD BIOPSY POLYPECTOMIES;  Surgeon: Chuy Bronson Jr., MD;  Location: [facility] ENDOSCOPY;  Service: General;  Laterality: N/A;  PRE- SCREENING  POST- POLYPS    EPIDURAL BLOCK      FOOT SURGERY Right     RHEUMATOID BONE SPURS REMOVED, GRAFT FROM KNEE    HYSTERECTOMY      THORACIC DECOMPRESSION POSTERIOR FUSION WITH INSTRUMENTATION N/A 2023    Procedure: T11-12 LAMINECTOMY FUSION;  Surgeon: Smith Montgomery MD;  Location: [facility] MAIN OR;  Service: Orthopedic Spine;  Laterality: N/A;                         PT Assessment/Plan       Row Name 03/13/25 1050          PT Assessment    Assessment Comments Pt is doing well with good tolerance to her exercise progression.  -GC        PT Plan    PT Plan Comments Pt is to continue her HEP daily. Will re-ck again in 2 weeks.  -GC               User Key  (r) = Recorded By, (t) = Taken By, (c) = Cosigned By      Initials Name Provider Type    GC Neil Hernandez, PT Physical Therapist                       OP Exercises       Row Name 03/13/25 1050             Subjective    Subjective Comments Pt states she is still having some trouble reaching up with her right arm.  -GC         Exercise 1    Exercise Name 1 sidelying shoulder ER  -GC      Reps 1 25  -GC      Time 1 1#  -GC         Exercise 2    Exercise Name 2 Scaption up  -GC      Reps 2 25  -GC      Time 2 1#  -GC         Exercise 3    Exercise Name 3 Scaption down  -GC      Reps 3 35  -GC      Time 3 1#  -GC         Exercise 4    Exercise Name 4 Shoulder ER vs theraband  -GC      Reps 4 25  -GC      Time 4 green  -GC         Exercise 5    Exercise Name 5 Shoulder IR vs theraband  -GC      Reps 5 35  -GC      Time 5 blue  -GC         Exercise 6    Exercise Name 6 Shoulder EXT vs theraband  -GC      Reps 6 35  -GC      Time 6 silver  -GC         Exercise 7    Exercise Name 7 Shoulder Rows vs theraband  -GC      Reps 7 35  -GC      Time 7 silver  -GC         Exercise 8    Exercise Name 8 bent over Ts and Ys  -GC      Reps 8 35  -GC      Time 8 2#  -GC         Exercise 9    Exercise Name 9 shoulder FLEX vs theraband  -GC      Reps 9 25  -GC      Time 9 green  -GC         Exercise 10    Exercise Name 10 cane stretch-FLEX  -GC      Reps 10 15  -GC      Time 10 5 secs  -GC         Exercise 11    Exercise Name 11 cane stretch-ABD  -GC      Reps 11 15  -GC      Time 11 5 secs  -GC                User Key  (r) = Recorded By, (t) = Taken By, (c) = Cosigned By      Initials Name Provider Type    Neil Garza, PT  Physical Therapist                                                    Time Calculation:   Start Time: 1050  Stop Time: 1141  Time Calculation (min): 51 min  Therapy Charges for Today       Code Description Service Date Service Provider Modifiers Qty    [id number]  PT THER PROC EA 15 MIN 3/13/2025 Neil Hernandez, PT GP 1                      Neil Hernandez, PT  3/13/2025

## 2025-03-25 ENCOUNTER — OFFICE VISIT (OUTPATIENT)
Dept: NEUROLOGY | Facility: CLINIC | Age: 70
End: 2025-03-25
Payer: MEDICARE

## 2025-03-25 ENCOUNTER — LAB (OUTPATIENT)
Dept: LAB | Facility: HOSPITAL | Age: 70
End: 2025-03-25
Payer: MEDICARE

## 2025-03-25 VITALS
OXYGEN SATURATION: 95 % | SYSTOLIC BLOOD PRESSURE: 118 MMHG | WEIGHT: 174 LBS | HEART RATE: 71 BPM | BODY MASS INDEX: 29.71 KG/M2 | HEIGHT: 64 IN | DIASTOLIC BLOOD PRESSURE: 62 MMHG

## 2025-03-25 DIAGNOSIS — R20.2 NUMBNESS AND TINGLING OF BOTH LEGS: ICD-10-CM

## 2025-03-25 DIAGNOSIS — G95.9 MYELOPATHY: ICD-10-CM

## 2025-03-25 DIAGNOSIS — G60.9 IDIOPATHIC NEUROPATHY: Primary | ICD-10-CM

## 2025-03-25 DIAGNOSIS — G60.9 IDIOPATHIC NEUROPATHY: ICD-10-CM

## 2025-03-25 DIAGNOSIS — R20.0 NUMBNESS AND TINGLING OF BOTH LEGS: ICD-10-CM

## 2025-03-25 DIAGNOSIS — G62.9 POLYNEUROPATHY: ICD-10-CM

## 2025-03-25 LAB
CRP SERPL-MCNC: <0.3 MG/DL (ref 0–0.5)
ERYTHROCYTE [SEDIMENTATION RATE] IN BLOOD: 13 MM/HR (ref 0–30)
RPR SER QL: NORMAL

## 2025-03-25 PROCEDURE — 83825 ASSAY OF MERCURY: CPT

## 2025-03-25 PROCEDURE — 84165 PROTEIN E-PHORESIS SERUM: CPT | Performed by: NURSE PRACTITIONER

## 2025-03-25 PROCEDURE — 3078F DIAST BP <80 MM HG: CPT | Performed by: NURSE PRACTITIONER

## 2025-03-25 PROCEDURE — 86140 C-REACTIVE PROTEIN: CPT

## 2025-03-25 PROCEDURE — 86592 SYPHILIS TEST NON-TREP QUAL: CPT

## 2025-03-25 PROCEDURE — 1160F RVW MEDS BY RX/DR IN RCRD: CPT | Performed by: NURSE PRACTITIONER

## 2025-03-25 PROCEDURE — 85652 RBC SED RATE AUTOMATED: CPT

## 2025-03-25 PROCEDURE — 82595 ASSAY OF CRYOGLOBULIN: CPT

## 2025-03-25 PROCEDURE — 82175 ASSAY OF ARSENIC: CPT

## 2025-03-25 PROCEDURE — 1159F MED LIST DOCD IN RCRD: CPT | Performed by: NURSE PRACTITIONER

## 2025-03-25 PROCEDURE — 3074F SYST BP LT 130 MM HG: CPT | Performed by: NURSE PRACTITIONER

## 2025-03-25 PROCEDURE — 83655 ASSAY OF LEAD: CPT

## 2025-03-25 PROCEDURE — 36415 COLL VENOUS BLD VENIPUNCTURE: CPT

## 2025-03-25 PROCEDURE — 82525 ASSAY OF COPPER: CPT

## 2025-03-25 PROCEDURE — 99214 OFFICE O/P EST MOD 30 MIN: CPT | Performed by: NURSE PRACTITIONER

## 2025-03-25 PROCEDURE — 84155 ASSAY OF PROTEIN SERUM: CPT | Performed by: NURSE PRACTITIONER

## 2025-03-25 RX ORDER — BACLOFEN 10 MG/1
5 TABLET ORAL 2 TIMES DAILY
Qty: 90 TABLET | Refills: 0 | Status: SHIPPED | OUTPATIENT
Start: 2025-03-25 | End: 2025-06-23

## 2025-03-25 NOTE — PROGRESS NOTES
Notes by LPN:  Patient presents for 2 mo follow up numbness and tingling. Stopped using compound cream, didn't see much benefit. Baclofen helping but only takes half a pill nightly. Doesn't notice the pain as much when she is up doing things, worse when sitting down.  Patient or patient representative verbalized consent for the use of Ambient Listening during the visit with  SANAM Lawson for chart documentation. 3/25/2025  13:42 EDT        History of Present Illness  The patient is a 69-year-old female with a known past medical history of degenerative disk disease, hyperlipidemia, CKD stage 3, hypertension, and rheumatoid arthritis. She presents for follow-up of polyneuropathy.    She was initially seen on 12/31/2024 for polyneuropathy and was last seen by a colleague on 10/31/2024 for the same diagnosis. During the last visit, it was recommended to start baclofen 5 mg nightly and increase to 10 mg nightly if tolerated. She has found benefit with just 5 mg nightly, which she continues. She also remains on Cymbalta 60 mg in the morning, as the previous nighttime dose was discontinued due to side effects. She continues to take amitriptyline 25 mg nightly. A topical pain cream was prescribed as an alternative, but she did not experience any pain relief from it. She reports experiencing more nerve pain when sitting down at night, particularly during sedentary activities such as watching TV or reading. The pain is described as intermittent pins-and-needles sensations, which can be alleviated by applying pressure with her hand. The left leg appears to be more affected than the right. She has not experienced any falls or hospitalizations since her last visit and reports no significant changes or adjustments in her medication regimen. She has recently started taking magnesium supplements, which have been beneficial for her restless legs syndrome. Her sleep quality is reported to be good. She has a diagnosis of  lumbar stenosis and underwent a full spine MRI prior to her surgery in 07/2023. She has noticed numbness in her legs and reports that her symptoms began shortly before her spine surgery and have not improved postoperatively. In fact, she feels that her symptoms have worsened since the surgery. She has reduced her baclofen dosage to half a pill due to the occurrence of feet spasms, which have shown significant improvement.    MEDICATIONS  Baclofen, Cymbalta, amitriptyline      Past Medical History:   Diagnosis Date    Anemia     Back pain     NUMBNESS/ TINGLINGPAIN DOWN BILAT LOWER EXTREMITIES    Cataract     Chronic kidney disease     STAGE III - FOLLOWED ONLY BY PCP    DDD (degenerative disc disease), lumbar     Female infertility     Gout     Heart murmur     NO PROBLEMS    Hyperlipidemia     Hypertension     RA (rheumatoid arthritis)     NO PROBLEMS WITH FLEX/ EXTENSION         Past Surgical History:   Procedure Laterality Date    COLONOSCOPY N/A 5/20/2021    Procedure: COLONOSCOPY INTO CECUM WITH COLD BIOPSY POLYPECTOMIES;  Surgeon: Chuy Bronson Jr., MD;  Location: [facility] ENDOSCOPY;  Service: General;  Laterality: N/A;  PRE- SCREENING  POST- POLYPS    EPIDURAL BLOCK      FOOT SURGERY Right     RHEUMATOID BONE SPURS REMOVED, GRAFT FROM KNEE    HYSTERECTOMY      THORACIC DECOMPRESSION POSTERIOR FUSION WITH INSTRUMENTATION N/A 7/19/2023    Procedure: T11-12 LAMINECTOMY FUSION;  Surgeon: Smith Montgomery MD;  Location: [facility] MAIN OR;  Service: Orthopedic Spine;  Laterality: N/A;           Current Outpatient Medications:     baclofen (LIORESAL) 10 MG tablet, Take 1 tablet by mouth Every Night. TAKE 1/2 TABLET X 3 NIGHTS IF TOLERATED THEN INCREASE TO 1 TABLET NIGHTLY (Patient taking differently: Take 1 tablet by mouth Every Night. TAKE 1/2 TABLET nightly), Disp: 30 tablet, Rfl: 2    Acetaminophen (Tylenol) 325 MG capsule, Take 500 mg by mouth As Needed., Disp: , Rfl:     allopurinol (ZYLOPRIM) 100 MG tablet, Take 1  tablet by mouth 2 (Two) Times a Day., Disp: , Rfl:     amitriptyline (ELAVIL) 25 MG tablet, Take 1 tablet by mouth Every Night., Disp: 90 tablet, Rfl: 3    amLODIPine (NORVASC) 10 MG tablet, Take 1 tablet by mouth Daily. (Patient not taking: Reported on 1/13/2025), Disp: , Rfl:     amLODIPine (NORVASC) 5 MG tablet, , Disp: , Rfl:     atorvastatin (LIPITOR) 10 MG tablet, Take 1 tablet by mouth Daily., Disp: , Rfl:     B Complex Vitamins (vitamin b complex) capsule capsule, Take  by mouth Daily. (Patient not taking: Reported on 1/13/2025), Disp: , Rfl:     Cholecalciferol (VITAMIN D3) 1000 units capsule, Take 2 capsules by mouth Daily., Disp: , Rfl:     Coreg 12.5 MG tablet, Take 1 tablet by mouth 2 (Two) Times a Day With Meals., Disp: , Rfl:     diphenhydrAMINE (BENADRYL) 25 mg capsule, Take 1 capsule by mouth Every 6 (Six) Hours As Needed for Allergies., Disp: , Rfl:     DULoxetine (CYMBALTA) 60 MG capsule, , Disp: , Rfl:     Enbrel SureClick 50 MG/ML solution auto-injector, , Disp: , Rfl:     folic acid (FOLVITE) 400 MCG tablet, Take 1 tablet by mouth 3 (Three) Times a Day., Disp: , Rfl:     HYDROcodone-acetaminophen (Norco) 7.5-325 MG per tablet, Take 1 tablet by mouth Every 6 (Six) Hours As Needed for Moderate Pain. (Patient not taking: Reported on 1/13/2025), Disp: 50 tablet, Rfl: 0    Ibuprofen 3 %, Gabapentin 10 %, Baclofen 2 %, lidocaine 4 %, Ketamine HCl 10 %, Apply 1-2 g topically to the appropriate area as directed 3 (Three) to 4 (Four) times daily. (Patient not taking: Reported on 1/13/2025), Disp: 90 g, Rfl: 5    losartan (COZAAR) 50 MG tablet, Take 1 tablet by mouth Every Night. TO HOLD NIGHT BEFORE SURGERY (Patient not taking: Reported on 1/13/2025), Disp: , Rfl:     methotrexate 2.5 MG tablet, , Disp: , Rfl:     triamterene-hydrochlorothiazide (MAXZIDE-25) 37.5-25 MG per tablet, Take 1 tablet by mouth Every Other Day., Disp: , Rfl:     TURMERIC PO, Take  by mouth., Disp: , Rfl:       Family History  "  Problem Relation Age of Onset    Breast cancer Neg Hx     Ovarian cancer Neg Hx     Uterine cancer Neg Hx     Colon cancer Neg Hx     Deep vein thrombosis Neg Hx     Pulmonary embolism Neg Hx     Malig Hyperthermia Neg Hx          Social History     Socioeconomic History    Marital status:    Tobacco Use    Smoking status: Never     Passive exposure: Never    Smokeless tobacco: Never   Vaping Use    Vaping status: Never Used   Substance and Sexual Activity    Alcohol use: Yes     Comment: wine occasional    Drug use: No    Sexual activity: Not Currently     Birth control/protection: Hysterectomy, Post-menopausal         Allergies   Allergen Reactions    Lisinopril Swelling and Cough         Physical Exam:  Vitals:    03/25/25 1300   BP: 118/62   BP Location: Left arm   Patient Position: Sitting   Cuff Size: Adult   Pulse: 71   SpO2: 95%   Weight: 78.9 kg (174 lb)   Height: 162.6 cm (64.02\")     Body mass index is 29.85 kg/m².        Results:  Lab Results   Component Value Date    GLUCOSE 105 (H) 07/24/2023    BUN 15 07/24/2023    CREATININE 0.87 07/24/2023    BCR 17.2 07/24/2023    CO2 28.7 07/24/2023    CALCIUM 9.6 07/24/2023    ALBUMIN 4.4 08/01/2024    AST 23 07/20/2023    ALT 13 07/20/2023       Lab Results   Component Value Date    WBC 6.06 07/23/2023    HGB 8.4 (L) 07/23/2023    HCT 24.3 (L) 07/23/2023    .0 (H) 07/23/2023     07/23/2023         Lab Results   Component Value Date    TSH 1.920 08/01/2024         Lab Results   Component Value Date    APRFNFGR40 1,015 (H) 08/01/2024     Assessment & Plan  1. Polyneuropathy/ myopathy .  She reports experiencing more nerve pain when sitting down at night, particularly during sedentary activities such as watching TV or reading. The pain is described as intermittent pins-and-needles sensations, which can be alleviated by applying pressure with her hand. The left leg appears to be more affected than the right. She has not experienced any falls " or hospitalizations since her last visit and reports no significant changes or adjustments in her medication regimen. She has recently started taking magnesium supplements, which have been beneficial for her restless legs syndrome. Her sleep quality is reported to be good. She has a diagnosis of lumbar stenosis and underwent a full spine MRI prior to her surgery in 07/2023. She has noticed numbness in her legs and reports that her symptoms began shortly before her spine surgery and have not improved postoperatively. In fact, she feels that her symptoms have worsened since the surgery. She will continue her current regimen of baclofen 5 mg nightly, Cymbalta 60 mg in the morning, and amitriptyline 25 mg nightly. A repeat EMG of the left lower extremity will be ordered to assess for any changes or progression in her condition. Additional labs, including heavy metals, cryoglobulin, and inflammatory markers, will be ordered to rule out treatable causes of neuropathy. She is advised to try using heat socks at bedtime to alleviate restlessness and jolts of pain.    Follow-up  The patient will follow up in 3 months or within the next 6 months.    PROCEDURE  The patient underwent spine surgery in July 2023.    Plan:  Continue baclofen; consider 5 mg twice daily  Continue Cymbalta 60 mg in a.m.  Pleat remaining treatable cause peripheral neuropathy labs ordered below  Continue amitriptyline 25 mg at night  Discontinue Rx alternatives topical pain cream due to poor response  Consider MRI lumbar spine will attempt to get last note and spine imaging from [facility]  Could consider skin biopsy to exclude small fiber neuropathy source  EMG left lower extremity  Follow-up with me after EMG        Diagnoses and all orders for this visit:    1. Idiopathic neuropathy (Primary)  -     Heavy Metals, Blood; Future  -     Cryoglobulin; Future  -     RPR Qualitative with Reflex to Quant; Future  -     Sedimentation Rate; Future  -      C-reactive Protein; Future  -     Copper, Serum; Future  -     Protein Elec + Interp, Serum  -     EMG & Nerve Conduction Test; Future    2. Numbness and tingling of both legs  -     Heavy Metals, Blood; Future  -     Cryoglobulin; Future  -     RPR Qualitative with Reflex to Quant; Future  -     Sedimentation Rate; Future  -     C-reactive Protein; Future  -     Copper, Serum; Future  -     Protein Elec + Interp, Serum  -     EMG & Nerve Conduction Test; Future    3. Myelopathy    4. Polyneuropathy                Dictated utilizing Dragon dictation.

## 2025-03-26 ENCOUNTER — RESULTS FOLLOW-UP (OUTPATIENT)
Dept: NEUROLOGY | Facility: CLINIC | Age: 70
End: 2025-03-26
Payer: MEDICARE

## 2025-03-26 LAB
ALBUMIN SERPL ELPH-MCNC: 4.2 G/DL (ref 2.9–4.4)
ALBUMIN/GLOB SERPL: 1.7 {RATIO} (ref 0.7–1.7)
ALPHA1 GLOB SERPL ELPH-MCNC: 0.3 G/DL (ref 0–0.4)
ALPHA2 GLOB SERPL ELPH-MCNC: 0.6 G/DL (ref 0.4–1)
B-GLOBULIN SERPL ELPH-MCNC: 0.8 G/DL (ref 0.7–1.3)
GAMMA GLOB SERPL ELPH-MCNC: 0.8 G/DL (ref 0.4–1.8)
GLOBULIN SER CALC-MCNC: 2.5 G/DL (ref 2.2–3.9)
LABORATORY COMMENT REPORT: NORMAL
M PROTEIN SERPL ELPH-MCNC: NORMAL G/DL
PROT PATTERN SERPL ELPH-IMP: NORMAL
PROT SERPL-MCNC: 6.7 G/DL (ref 6–8.5)

## 2025-03-27 ENCOUNTER — HOSPITAL ENCOUNTER (OUTPATIENT)
Dept: PHYSICAL THERAPY | Facility: HOSPITAL | Age: 70
Setting detail: THERAPIES SERIES
Discharge: HOME | End: 2025-03-27
Payer: MEDICARE

## 2025-03-27 DIAGNOSIS — M12.811 ROTATOR CUFF ARTHROPATHY OF RIGHT SHOULDER: Primary | ICD-10-CM

## 2025-03-27 PROCEDURE — 97110 THERAPEUTIC EXERCISES: CPT | Performed by: PHYSICAL THERAPIST

## 2025-03-27 NOTE — THERAPY TREATMENT NOTE
Outpatient Physical Therapy Ortho Treatment Note   Megan     Patient Name: Leann Andrew  : 1955  MRN: [id number]  Today's Date: 3/27/2025      Visit Date: 2025    Visit Dx:    ICD-10-CM ICD-9-CM   1. Rotator cuff arthropathy of right shoulder  M12.811 716.81       Patient Active Problem List   Diagnosis    RA (rheumatoid arthritis)    GERD (gastroesophageal reflux disease)    HTN (hypertension)    Hypercholesteremia    DDD (degenerative disc disease), lumbar    Screening for colon cancer    Thoracic spinal stenosis    Chronic kidney disease    Heart murmur    Back pain    Osteopenia    Anemia due to chronic illness    Bilateral cataracts    Long-term use of high-risk medication    Lumbar radiculopathy, acute    Menopause    Benign hypertension with chronic kidney disease, stage III    Degenerative scoliosis in adult patient    Idiopathic neuropathy    Thrombocytopenia        Past Medical History:   Diagnosis Date    Anemia     Back pain     NUMBNESS/ TINGLINGPAIN DOWN BILAT LOWER EXTREMITIES    Cataract     Chronic kidney disease     STAGE III - FOLLOWED ONLY BY PCP    DDD (degenerative disc disease), lumbar     Female infertility     Gout     Heart murmur     NO PROBLEMS    Hyperlipidemia     Hypertension     RA (rheumatoid arthritis)     NO PROBLEMS WITH FLEX/ EXTENSION        Past Surgical History:   Procedure Laterality Date    COLONOSCOPY N/A 2021    Procedure: COLONOSCOPY INTO CECUM WITH COLD BIOPSY POLYPECTOMIES;  Surgeon: Chuy Bronson Jr., MD;  Location: [facility] ENDOSCOPY;  Service: General;  Laterality: N/A;  PRE- SCREENING  POST- POLYPS    EPIDURAL BLOCK      FOOT SURGERY Right     RHEUMATOID BONE SPURS REMOVED, GRAFT FROM KNEE    HYSTERECTOMY      THORACIC DECOMPRESSION POSTERIOR FUSION WITH INSTRUMENTATION N/A 2023    Procedure: T11-12 LAMINECTOMY FUSION;  Surgeon: Smith Montgomery MD;  Location: [facility] MAIN OR;  Service: Orthopedic Spine;  Laterality: N/A;        PT  Ortho       Row Name 03/27/25 1100       Right Upper Ext    Rt Shoulder Abduction AROM 165 degrees  -GC    Rt Shoulder Flexion AROM 165 degrees  -GC              User Key  (r) = Recorded By, (t) = Taken By, (c) = Cosigned By      Initials Name Provider Type    GC Neil Hernandez, PT Physical Therapist                                 PT Assessment/Plan       Row Name 03/27/25 1100          PT Assessment    Assessment Comments Pt has excellent AROM and feel her limitations are an endurance issue.  -GC        PT Plan    PT Plan Comments Pt is to continue her HEP with lower resistance and higher reps daily as HEP. Will re-ck in 2 weeks.  -GC               User Key  (r) = Recorded By, (t) = Taken By, (c) = Cosigned By      Initials Name Provider Type    Neil Garza PT Physical Therapist                     Modalities       Row Name 03/27/25 1100             Subjective    Subjective Comments Pt states her shoulder seems to be holding up pretty well, but she does c/o fatigue with fixing her hair or stirring while cooking.  -GC         Moist Heat    MH Applied Yes  -GC      Location right shoulder  -GC      PT Moist Heat Minutes 10  -GC      MH Prior to Rx Yes  -GC         Functional Testing    Outcome Measure Options Quick DASH  -GC                User Key  (r) = Recorded By, (t) = Taken By, (c) = Cosigned By      Initials Name Provider Type    Neil Garza PT Physical Therapist                   OP Exercises       Row Name 03/27/25 1100             Subjective    Subjective Comments Pt states her shoulder seems to be holding up pretty well, but she does c/o fatigue with fixing her hair or stirring while cooking.  -GC         Exercise 1    Exercise Name 1 sidelying shoulder ER  -GC      Reps 1 40  -GC         Exercise 2    Exercise Name 2 Scaption up  -GC      Reps 2 40  -GC      Time 2 --  -GC         Exercise 3    Exercise Name 3 Scaption down  -GC      Reps 3 40  -GC      Time 3 --  -GC         Exercise 4     Exercise Name 4 Shoulder ER vs theraband  -GC      Reps 4 40  -GC      Time 4 yellow  -GC         Exercise 5    Exercise Name 5 Shoulder IR vs theraband  -GC      Reps 5 40  -GC      Time 5 red  -GC         Exercise 6    Exercise Name 6 Shoulder EXT vs theraband  -GC         Exercise 7    Exercise Name 7 Shoulder Rows vs theraband  -GC         Exercise 8    Exercise Name 8 bent over Ts and Ys  -GC         Exercise 9    Exercise Name 9 shoulder FLEX vs theraband  -GC         Exercise 10    Exercise Name 10 cane stretch-FLEX  -GC         Exercise 11    Exercise Name 11 cane stretch-ABD  -GC                User Key  (r) = Recorded By, (t) = Taken By, (c) = Cosigned By      Initials Name Provider Type    GC Neil Hernandez, PT Physical Therapist                                          Outcome Measure Options: Quick DASH         Time Calculation:   Start Time: 1100  Stop Time: 1138  Time Calculation (min): 38 min  Untimed Charges  PT Moist Heat Minutes: 10  Total Minutes  Untimed Charges Total Minutes: 10   Total Minutes: 10  Therapy Charges for Today       Code Description Service Date Service Provider Modifiers Qty    [id number] HC PT THER PROC EA 15 MIN 3/27/2025 Neil Hernandez, PT GP 1            PT G-Codes  Outcome Measure Options: Rosalia Hernandez PT  3/27/2025

## 2025-03-29 LAB — COPPER SERPL-MCNC: 106 UG/DL (ref 80–158)

## 2025-03-31 LAB — CRYOGLOB SER QL 1D COLD INC: NORMAL

## 2025-04-01 LAB
ARSENIC BLD-MCNC: 7 UG/L (ref 0–9)
LEAD BLDV-MCNC: <1 UG/DL (ref 0–3.4)
MERCURY BLD-MCNC: <1 UG/L (ref 0–14.9)

## 2025-04-10 ENCOUNTER — HOSPITAL ENCOUNTER (OUTPATIENT)
Dept: PHYSICAL THERAPY | Facility: HOSPITAL | Age: 70
Setting detail: THERAPIES SERIES
Discharge: HOME | End: 2025-04-10
Payer: MEDICARE

## 2025-04-10 DIAGNOSIS — M12.811 ROTATOR CUFF ARTHROPATHY OF RIGHT SHOULDER: Primary | ICD-10-CM

## 2025-04-10 NOTE — THERAPY TREATMENT NOTE
Outpatient Physical Therapy Ortho Treatment Note   Megan     Patient Name: Leann Andrew  : 1955  MRN: [id number]  Today's Date: 4/10/2025      Visit Date: 04/10/2025    Visit Dx:    ICD-10-CM ICD-9-CM   1. Rotator cuff arthropathy of right shoulder  M12.811 716.81       Patient Active Problem List   Diagnosis    RA (rheumatoid arthritis)    GERD (gastroesophageal reflux disease)    HTN (hypertension)    Hypercholesteremia    DDD (degenerative disc disease), lumbar    Screening for colon cancer    Thoracic spinal stenosis    Chronic kidney disease    Heart murmur    Back pain    Osteopenia    Anemia due to chronic illness    Bilateral cataracts    Long-term use of high-risk medication    Lumbar radiculopathy, acute    Menopause    Benign hypertension with chronic kidney disease, stage III    Degenerative scoliosis in adult patient    Idiopathic neuropathy    Thrombocytopenia        Past Medical History:   Diagnosis Date    Anemia     Back pain     NUMBNESS/ TINGLINGPAIN DOWN BILAT LOWER EXTREMITIES    Cataract     Chronic kidney disease     STAGE III - FOLLOWED ONLY BY PCP    DDD (degenerative disc disease), lumbar     Female infertility     Gout     Heart murmur     NO PROBLEMS    Hyperlipidemia     Hypertension     RA (rheumatoid arthritis)     NO PROBLEMS WITH FLEX/ EXTENSION        Past Surgical History:   Procedure Laterality Date    COLONOSCOPY N/A 2021    Procedure: COLONOSCOPY INTO CECUM WITH COLD BIOPSY POLYPECTOMIES;  Surgeon: Chuy Bronson Jr., MD;  Location: [facility] ENDOSCOPY;  Service: General;  Laterality: N/A;  PRE- SCREENING  POST- POLYPS    EPIDURAL BLOCK      FOOT SURGERY Right     RHEUMATOID BONE SPURS REMOVED, GRAFT FROM KNEE    HYSTERECTOMY      THORACIC DECOMPRESSION POSTERIOR FUSION WITH INSTRUMENTATION N/A 2023    Procedure: T11-12 LAMINECTOMY FUSION;  Surgeon: Smith Montgomery MD;  Location: [facility] MAIN OR;  Service: Orthopedic Spine;  Laterality: N/A;                         PT Assessment/Plan       Row Name 04/10/25 1055          PT Assessment    Assessment Comments Pt continues to show improving ROM and function.  -GC        PT Plan    PT Plan Comments Pt is to continue her HEP daily. Will re-ck again in 2 weeks.  -GC               User Key  (r) = Recorded By, (t) = Taken By, (c) = Cosigned By      Initials Name Provider Type    GC Neil Hernandez, PT Physical Therapist                     Modalities       Row Name 04/10/25 1055             Moist Heat    MH Applied Yes  -GC      Location right shoulder  -GC      PT Moist Heat Minutes 10  -GC      MH Prior to Rx Yes  -GC         Functional Testing    Outcome Measure Options Quick DASH  -GC                User Key  (r) = Recorded By, (t) = Taken By, (c) = Cosigned By      Initials Name Provider Type    GC Neil Hernandez, PT Physical Therapist                   OP Exercises       Row Name 04/10/25 1055             Subjective    Subjective Comments Pt states her shoulder is holding up pretty well.  -GC         Exercise 1    Exercise Name 1 sidelying shoulder ER  -GC      Reps 1 40  -GC         Exercise 2    Exercise Name 2 Scaption up  -GC      Reps 2 40  -GC         Exercise 3    Exercise Name 3 Scaption down  -GC      Reps 3 30  -GC      Time 3 1#  -GC         Exercise 4    Exercise Name 4 Shoulder ER vs theraband  -GC      Reps 4 40  -GC      Time 4 red  -GC         Exercise 5    Exercise Name 5 Shoulder IR vs theraband  -GC      Reps 5 40  -GC      Time 5 red  -GC         Exercise 6    Exercise Name 6 Shoulder EXT vs theraband  -GC         Exercise 7    Exercise Name 7 Shoulder Rows vs theraband  -GC         Exercise 8    Exercise Name 8 bent over Ts and Ys  -GC         Exercise 9    Exercise Name 9 shoulder FLEX vs theraband  -GC         Exercise 10    Exercise Name 10 cane stretch-FLEX  -GC         Exercise 11    Exercise Name 11 cane stretch-ABD  -GC                User Key  (r) = Recorded By, (t) = Taken By,  (c) = Cosigned By      Initials Name Provider Type    Neil Garza, PT Physical Therapist                                          Outcome Measure Options: Quick DASH         Time Calculation:   Untimed Charges  PT Moist Heat Minutes: 10  Total Minutes  Untimed Charges Total Minutes: 10   Total Minutes: 10      PT G-Codes  Outcome Measure Options: Rosalia Hernandez, ANTONIO  4/10/2025

## 2025-04-24 ENCOUNTER — HOSPITAL ENCOUNTER (OUTPATIENT)
Dept: PHYSICAL THERAPY | Facility: HOSPITAL | Age: 70
Setting detail: THERAPIES SERIES
Discharge: HOME | End: 2025-04-24
Payer: MEDICARE

## 2025-04-24 DIAGNOSIS — M12.811 ROTATOR CUFF ARTHROPATHY OF RIGHT SHOULDER: Primary | ICD-10-CM

## 2025-04-24 PROCEDURE — 97164 PT RE-EVAL EST PLAN CARE: CPT | Performed by: PHYSICAL THERAPIST

## 2025-04-24 NOTE — THERAPY RE-EVALUATION
Outpatient Physical Therapy Ortho Re-Evaluation   Megan     Patient Name: Leann Andrew  : 1955  MRN: [id number]  Today's Date: 2025      Visit Date: 2025    Patient Active Problem List   Diagnosis    RA (rheumatoid arthritis)    GERD (gastroesophageal reflux disease)    HTN (hypertension)    Hypercholesteremia    DDD (degenerative disc disease), lumbar    Screening for colon cancer    Thoracic spinal stenosis    Chronic kidney disease    Heart murmur    Back pain    Osteopenia    Anemia due to chronic illness    Bilateral cataracts    Long-term use of high-risk medication    Lumbar radiculopathy, acute    Menopause    Benign hypertension with chronic kidney disease, stage III    Degenerative scoliosis in adult patient    Idiopathic neuropathy    Thrombocytopenia        Past Medical History:   Diagnosis Date    Anemia     Back pain     NUMBNESS/ TINGLINGPAIN DOWN BILAT LOWER EXTREMITIES    Cataract     Chronic kidney disease     STAGE III - FOLLOWED ONLY BY PCP    DDD (degenerative disc disease), lumbar     Female infertility     Gout     Heart murmur     NO PROBLEMS    Hyperlipidemia     Hypertension     RA (rheumatoid arthritis)     NO PROBLEMS WITH FLEX/ EXTENSION        Past Surgical History:   Procedure Laterality Date    COLONOSCOPY N/A 2021    Procedure: COLONOSCOPY INTO CECUM WITH COLD BIOPSY POLYPECTOMIES;  Surgeon: Chuy Bronson Jr., MD;  Location: [facility] ENDOSCOPY;  Service: General;  Laterality: N/A;  PRE- SCREENING  POST- POLYPS    EPIDURAL BLOCK      FOOT SURGERY Right     RHEUMATOID BONE SPURS REMOVED, GRAFT FROM KNEE    HYSTERECTOMY      THORACIC DECOMPRESSION POSTERIOR FUSION WITH INSTRUMENTATION N/A 2023    Procedure: T11-12 LAMINECTOMY FUSION;  Surgeon: Smith Montgomery MD;  Location: [facility] MAIN OR;  Service: Orthopedic Spine;  Laterality: N/A;       Visit Dx:     ICD-10-CM ICD-9-CM   1. Rotator cuff arthropathy of right shoulder  M12.811 716.81               PT Ortho       Row Name 04/24/25 1100       Subjective    Subjective Comments Pt states her shoulder seems to be holding up pretty well.  -GC       Shoulder Impingement/Rotator Cuff Special Tests    Tucker-Kings Test (RC Lesion vs. Bursitis) Right:;Positive  -GC    Neer Impingement Test (RC Lesion vs. Bursitis) Right:;Positive  -GC    Drop Arm Test (Full Thickness RC Lesion) Right:;Positive  -GC       Right Upper Ext    Rt Shoulder Abduction AROM 171 degrees  -GC    Rt Shoulder Flexion AROM 170 degrees  -GC       MMT (Manual Muscle Testing)    General MMT Comments scaption strength is 3+/5  -GC       MMT Right Upper Ext    Rt Shoulder Flexion MMT, Gross Movement (3+/5) fair plus  -GC    Rt Shoulder Extension MMT, Gross Movement (5/5) normal  -GC    Rt Shoulder ABduction MMT, Gross Movement (3+/5) fair plus  -GC    Rt Shoulder ADduction MMT, Gross Movement (5/5) normal  -GC    Rt Shoulder Internal Rotation MMT, Gross Movement (4/5) good  -GC    Rt Shoulder External Rotation MMT, Gross Movement (3+/5) fair plus  -GC              User Key  (r) = Recorded By, (t) = Taken By, (c) = Cosigned By      Initials Name Provider Type    Neil Garza, PT Physical Therapist                                       PT OP Goals       Row Name 04/24/25 1100          PT Short Term Goals    STG Date to Achieve 02/18/25  -     STG 1 Decrease right shoulder pain to 3-4/10 with activity.  -GC     STG 1 Progress Met  -     STG 2 Increase AROM of righ shoulder to 130 degree FLEX with testing.  -     STG 2 Progress Met  -     STG 3 Increase right shoulder girdle strength to at least 3/5 all planes with testing.  -     STG 3 Progress Met  -     STG 4 Pt will be independent with her HEP issued by this therapist.  -     STG 4 Progress Met  -        Long Term Goals    LTG Date to Achieve 03/18/25  -     LTG 1 Decrease right shoulder pain to 0-1/10 with activity.  -     LTG 1 Progress Met  -     LTG 2 Increase AROM of  righ shoulder to 150 degree FLEX with testing.  -     LTG 2 Progress Met  -     LTG 3 Increase right shoulder girdle strength to at least 4/5 all planes with testing.  -     LTG 3 Progress Partially Met;Ongoing;Progressing  -     LTG 4 Pt will be independent with all ADLs without pain.  -     LTG 4 Progress Partially Met  -               User Key  (r) = Recorded By, (t) = Taken By, (c) = Cosigned By      Initials Name Provider Type     Neil Henrandez, PT Physical Therapist                     PT Assessment/Plan       Row Name 04/24/25 1100          PT Assessment    Assessment Comments Pt is doing well with increased shoulder ROM and strenght noted. She is also reporting improved daily function.  -        PT Plan    PT Frequency 1x/week  every other week  -     Predicted Duration of Therapy Intervention (PT) 2 months  -     Planned CPT's? PT THER PROC EA 15 MIN: 54445;PT MANUAL THERAPY EA 15 MIN: 91022;PT HOT OR COLD PACK TREAT MCARE  -     PT Plan Comments Pt is to continue her HEP daily.  -               User Key  (r) = Recorded By, (t) = Taken By, (c) = Cosigned By      Initials Name Provider Type     Neil Hernandez, PT Physical Therapist                       OP Exercises       Row Name 04/24/25 1100             Subjective    Subjective Comments Pt states her shoulder seems to be holding up pretty well.  -GC         Exercise 1    Exercise Name 1 sidelying shoulder ER  -GC      Reps 1 40  -GC         Exercise 2    Exercise Name 2 Scaption up  -GC      Reps 2 40  -GC         Exercise 3    Exercise Name 3 Scaption down  -GC      Reps 3 30  -GC      Time 3 1#  -GC         Exercise 4    Exercise Name 4 Shoulder ER vs theraband  -GC      Reps 4 40  -GC      Time 4 green  -GC         Exercise 5    Exercise Name 5 Shoulder IR vs theraband  -GC      Reps 5 40  -GC      Time 5 green  -GC         Exercise 6    Exercise Name 6 Shoulder EXT vs theraband  -GC      Reps 6 25  -GC      Time 6 green  -GC          Exercise 7    Exercise Name 7 Shoulder Rows vs theraband  -GC      Reps 7 25 green  -GC         Exercise 8    Exercise Name 8 bent over Ts and Ys  -GC      Reps 8 25  -GC      Time 8 1#  -GC         Exercise 9    Exercise Name 9 shoulder FLEX vs theraband  -GC         Exercise 10    Exercise Name 10 cane stretch-FLEX  -GC         Exercise 11    Exercise Name 11 cane stretch-ABD  -GC                User Key  (r) = Recorded By, (t) = Taken By, (c) = Cosigned By      Initials Name Provider Type     Neil Hernandez, PT Physical Therapist                                            Time Calculation:     Start Time: 1100  Stop Time: 1141  Time Calculation (min): 41 min     Therapy Charges for Today       Code Description Service Date Service Provider Modifiers Qty    [id number]  PT RE-EVAL ESTABLISHED PLAN 2 4/24/2025 Neil Hernandez, PT GP 1                      Neil Hernandez, PT  4/24/2025

## 2025-05-07 ENCOUNTER — PROCEDURE VISIT (OUTPATIENT)
Dept: NEUROLOGY | Facility: CLINIC | Age: 70
End: 2025-05-07
Payer: MEDICARE

## 2025-05-07 VITALS
DIASTOLIC BLOOD PRESSURE: 78 MMHG | HEIGHT: 64 IN | OXYGEN SATURATION: 98 % | SYSTOLIC BLOOD PRESSURE: 130 MMHG | HEART RATE: 81 BPM | BODY MASS INDEX: 29.85 KG/M2

## 2025-05-07 DIAGNOSIS — R20.2 NUMBNESS AND TINGLING OF BOTH LEGS: ICD-10-CM

## 2025-05-07 DIAGNOSIS — R20.0 NUMBNESS AND TINGLING OF BOTH LEGS: ICD-10-CM

## 2025-05-07 DIAGNOSIS — G60.9 IDIOPATHIC NEUROPATHY: ICD-10-CM

## 2025-05-07 NOTE — PROGRESS NOTES
EMG and Nerve Conduction Studies      History: 69-year-old woman with numbness tingling and discomfort in the left lower extremity.      Results: Nerve conduction studies were performed on the left lower extremity.  Left sural sensory nerve action potentials were normal throughout.  Left peroneal compound motor action potentials were low in amplitude throughout but normal in distal latency and borderline in velocity.  Left tibial compound motor action potentials were normal throughout.  Left tibial and peroneal F waves were present and reasonable in latency.  H-reflexes were present bilaterally.    EMG needle examination of selected muscles of the left lower extremity revealed no abnormal insertional activity, spontaneous activity, or motor unit remodeling.  Lumbar paraspinals were not sampled due to the presence of the surgical scar.    Impression: This nerve conduction study and EMG needle examination of the left lower extremity reveals changes suggestive of a chronic left peroneal neuropathy that could not be further localized.  (In comparison to his study from an outside center dated April 20, 2023, normal today's results appear to be minimally better but there is no substantial change.)    Antonio Smiley M.D.              Dictated utilizing Dragon dictation.

## 2025-05-14 ENCOUNTER — TELEPHONE (OUTPATIENT)
Dept: NEUROLOGY | Facility: CLINIC | Age: 70
End: 2025-05-14
Payer: MEDICARE

## 2025-05-14 ENCOUNTER — HOSPITAL ENCOUNTER (OUTPATIENT)
Dept: PHYSICAL THERAPY | Facility: HOSPITAL | Age: 70
Setting detail: THERAPIES SERIES
Discharge: HOME | End: 2025-05-14
Payer: MEDICARE

## 2025-05-14 DIAGNOSIS — M12.811 ROTATOR CUFF ARTHROPATHY OF RIGHT SHOULDER: Primary | ICD-10-CM

## 2025-05-14 NOTE — TELEPHONE ENCOUNTER
Caller: Leann Andrew    Relationship: Self    Best call back number: [phone]   What was the call regarding: THE PT IS CALLING TO SEE IF THE EMG RESULTS ARE IN AND IF SO COULD SOMEONE CALL HER TO TELL HER THE RESULTS.     THANK YOU

## 2025-05-14 NOTE — THERAPY TREATMENT NOTE
Outpatient Physical Therapy Ortho Treatment Note   Megan     Patient Name: Leann Andrew  : 1955  MRN: [id number]  Today's Date: 2025      Visit Date: 2025    Visit Dx:    ICD-10-CM ICD-9-CM   1. Rotator cuff arthropathy of right shoulder  M12.811 716.81       Patient Active Problem List   Diagnosis    RA (rheumatoid arthritis)    GERD (gastroesophageal reflux disease)    HTN (hypertension)    Hypercholesteremia    DDD (degenerative disc disease), lumbar    Screening for colon cancer    Thoracic spinal stenosis    Chronic kidney disease    Heart murmur    Back pain    Osteopenia    Anemia due to chronic illness    Bilateral cataracts    Long-term use of high-risk medication    Lumbar radiculopathy, acute    Menopause    Benign hypertension with chronic kidney disease, stage III    Degenerative scoliosis in adult patient    Idiopathic neuropathy    Thrombocytopenia        Past Medical History:   Diagnosis Date    Anemia     Back pain     NUMBNESS/ TINGLINGPAIN DOWN BILAT LOWER EXTREMITIES    Cataract     Chronic kidney disease     STAGE III - FOLLOWED ONLY BY PCP    DDD (degenerative disc disease), lumbar     Female infertility     Gout     Heart murmur     NO PROBLEMS    Hyperlipidemia     Hypertension     RA (rheumatoid arthritis)     NO PROBLEMS WITH FLEX/ EXTENSION        Past Surgical History:   Procedure Laterality Date    COLONOSCOPY N/A 2021    Procedure: COLONOSCOPY INTO CECUM WITH COLD BIOPSY POLYPECTOMIES;  Surgeon: Chuy Bronson Jr., MD;  Location: [facility] ENDOSCOPY;  Service: General;  Laterality: N/A;  PRE- SCREENING  POST- POLYPS    EPIDURAL BLOCK      FOOT SURGERY Right     RHEUMATOID BONE SPURS REMOVED, GRAFT FROM KNEE    HYSTERECTOMY      THORACIC DECOMPRESSION POSTERIOR FUSION WITH INSTRUMENTATION N/A 2023    Procedure: T11-12 LAMINECTOMY FUSION;  Surgeon: Smith Montgomery MD;  Location: [facility] MAIN OR;  Service: Orthopedic Spine;  Laterality: N/A;                         PT Assessment/Plan       Row Name 05/14/25 0930          PT Assessment    Assessment Comments Pt is doing well with good shoulder ROM and improving function.  -GC        PT Plan    PT Plan Comments Pt is to continue her HEP daily. She will call if problems arise.  -GC               User Key  (r) = Recorded By, (t) = Taken By, (c) = Cosigned By      Initials Name Provider Type    GC Neil Hernandez, PT Physical Therapist                       OP Exercises       Row Name 05/14/25 0930             Subjective    Subjective Comments Pt states her shoulder is doing well improved function being reported.  -GC         Exercise 1    Exercise Name 1 sidelying shoulder ER  -GC      Reps 1 25  -GC      Time 1 1#  -GC         Exercise 2    Exercise Name 2 Scaption up  -GC      Reps 2 20  -GC         Exercise 3    Exercise Name 3 Scaption down  -GC      Reps 3 20  -GC         Exercise 4    Exercise Name 4 Shoulder ER vs theraband  -GC      Reps 4 40  -GC      Time 4 green  -GC         Exercise 5    Exercise Name 5 Shoulder IR vs theraband  -GC      Reps 5 25  -GC      Time 5 blue  -GC         Exercise 6    Exercise Name 6 Shoulder EXT vs theraband  -GC      Reps 6 25  -GC      Time 6 green  -GC         Exercise 7    Exercise Name 7 Shoulder Rows vs theraband  -GC      Reps 7 25 green  -GC         Exercise 8    Exercise Name 8 bent over Ts and Ys  -GC      Reps 8 25  -GC      Time 8 1#  -GC         Exercise 9    Exercise Name 9 shoulder FLEX vs theraband  -GC         Exercise 10    Exercise Name 10 cane stretch-FLEX  -GC         Exercise 11    Exercise Name 11 cane stretch-ABD  -GC                User Key  (r) = Recorded By, (t) = Taken By, (c) = Cosigned By      Initials Name Provider Type    Neil Garza, PT Physical Therapist                                                    Time Calculation:                    Neil Hernandez PT  5/14/2025

## 2025-05-29 ENCOUNTER — TELEPHONE (OUTPATIENT)
Dept: NEUROLOGY | Facility: CLINIC | Age: 70
End: 2025-05-29
Payer: MEDICARE

## 2025-05-29 NOTE — TELEPHONE ENCOUNTER
Called patient and let her know the benefit verification results for skin biopsy had estimated out of pocket cost of $0. Patient scheduled for biopsy on 7/1/25

## 2025-06-12 ENCOUNTER — TRANSCRIBE ORDERS (OUTPATIENT)
Dept: BONE DENSITY | Facility: HOSPITAL | Age: 70
End: 2025-06-12
Payer: MEDICARE

## 2025-06-12 DIAGNOSIS — Z78.0 ENCOUNTER FOR OSTEOPOROSIS SCREENING IN ASYMPTOMATIC POSTMENOPAUSAL PATIENT: Primary | ICD-10-CM

## 2025-06-12 DIAGNOSIS — Z13.820 ENCOUNTER FOR OSTEOPOROSIS SCREENING IN ASYMPTOMATIC POSTMENOPAUSAL PATIENT: Primary | ICD-10-CM

## 2025-06-24 ENCOUNTER — APPOINTMENT (OUTPATIENT)
Dept: BONE DENSITY | Facility: HOSPITAL | Age: 70
End: 2025-06-24
Payer: MEDICARE

## 2025-06-24 DIAGNOSIS — Z78.0 ENCOUNTER FOR OSTEOPOROSIS SCREENING IN ASYMPTOMATIC POSTMENOPAUSAL PATIENT: ICD-10-CM

## 2025-06-24 DIAGNOSIS — Z13.820 ENCOUNTER FOR OSTEOPOROSIS SCREENING IN ASYMPTOMATIC POSTMENOPAUSAL PATIENT: ICD-10-CM

## 2025-06-24 PROCEDURE — 77080 DXA BONE DENSITY AXIAL: CPT

## 2025-07-01 ENCOUNTER — PROCEDURE VISIT (OUTPATIENT)
Dept: NEUROLOGY | Facility: CLINIC | Age: 70
End: 2025-07-01
Payer: MEDICARE

## 2025-07-01 VITALS
DIASTOLIC BLOOD PRESSURE: 70 MMHG | BODY MASS INDEX: 29.85 KG/M2 | HEART RATE: 64 BPM | HEIGHT: 64 IN | OXYGEN SATURATION: 94 % | SYSTOLIC BLOOD PRESSURE: 124 MMHG

## 2025-07-01 DIAGNOSIS — R20.0 NUMBNESS AND TINGLING OF BOTH LEGS: ICD-10-CM

## 2025-07-01 DIAGNOSIS — G95.9 MYELOPATHY: Primary | ICD-10-CM

## 2025-07-01 DIAGNOSIS — G60.9 IDIOPATHIC NEUROPATHY: ICD-10-CM

## 2025-07-01 DIAGNOSIS — R20.2 NUMBNESS AND TINGLING OF BOTH LEGS: ICD-10-CM

## 2025-07-01 RX ORDER — BACLOFEN 10 MG/1
TABLET ORAL
Qty: 90 TABLET | Refills: 0 | Status: SHIPPED | OUTPATIENT
Start: 2025-07-01

## 2025-07-01 RX ORDER — BACLOFEN 10 MG/1
TABLET ORAL
Qty: 30 TABLET | Refills: 1 | Status: SHIPPED | OUTPATIENT
Start: 2025-07-01

## 2025-07-01 NOTE — PROGRESS NOTES
Procedure   Skin Biopsy Procedure Note    PRE-OP DIAGNOSIS: Idiopathic Neuropathy   POST-OP DIAGNOSIS: Same     PROCEDURE: punch skin biopsies     Performing Provider: SANAM Pabon     Reason for Biopsy/Brief Clinical History: Leann Andrew is a  69 y.o.  female who was referred by myself  for alpha-synuclein skin biopsy.    Follow-up with referring provider in 6 to 8 weeks for results.      Biopsy site:      1.  Posterior Cervical (Left)  3 cm lateral from C7 Vertebrae    2.  Distal Thigh (Left)  10 cm above lateral knee    3.  Distal Leg (Left)  10 cm above the lateral malleolus       After obtaining informed consent, the area was prepped and draped in the usual fashion.     Timeout was performed including correct patient, date of birth, allergies and biopsy locations.    Anesthesia was obtained with 1% lidocaine with epinephrine.     A full thickness punch biopsy was obtained at each site with a 3mm punch. Gauze and bandage were applied and hemostasis was assured. The patient tolerated the procedure well.    Wound care discussed and handout with home instructions given to patient.    3 Specimen(s) were placed in each vial of fixative and packaged appropriately to be sent for processing at [facility].        SANAM Lawson

## 2025-07-17 ENCOUNTER — TRANSCRIBE ORDERS (OUTPATIENT)
Dept: ADMINISTRATIVE | Facility: HOSPITAL | Age: 70
End: 2025-07-17
Payer: MEDICARE

## 2025-07-17 DIAGNOSIS — Z12.31 VISIT FOR SCREENING MAMMOGRAM: Primary | ICD-10-CM

## 2025-08-22 ENCOUNTER — HOSPITAL ENCOUNTER (OUTPATIENT)
Dept: MAMMOGRAPHY | Facility: HOSPITAL | Age: 70
Discharge: HOME | End: 2025-08-22
Admitting: OBSTETRICS & GYNECOLOGY
Payer: MEDICARE

## 2025-08-22 DIAGNOSIS — Z12.31 VISIT FOR SCREENING MAMMOGRAM: ICD-10-CM

## 2025-08-22 PROCEDURE — 77063 BREAST TOMOSYNTHESIS BI: CPT | Performed by: RADIOLOGY

## 2025-08-22 PROCEDURE — 77067 SCR MAMMO BI INCL CAD: CPT

## 2025-08-22 PROCEDURE — 77063 BREAST TOMOSYNTHESIS BI: CPT

## 2025-08-22 PROCEDURE — 77067 SCR MAMMO BI INCL CAD: CPT | Performed by: RADIOLOGY

## (undated) DEVICE — POOLE SUCTION HANDLE: Brand: CARDINAL HEALTH

## (undated) DEVICE — DISPOSABLE BIPOLAR CABLE 12FT. (3.6M): Brand: KIRWAN

## (undated) DEVICE — TBG PENCL TELESCP MEGADYNE SMOKE EVAC 10FT

## (undated) DEVICE — DRP C/ARM 41X74IN

## (undated) DEVICE — PK ATS CUST W CARDIOTOMY RESEVOIR

## (undated) DEVICE — ANTIBACTERIAL UNDYED BRAIDED (POLYGLACTIN 910), SYNTHETIC ABSORBABLE SUTURE: Brand: COATED VICRYL

## (undated) DEVICE — EQUIPMENT COVER BAG TYPE 48” X 36” (122CM X 91CM): Brand: EQUIPMENT COVER BAG TYPE

## (undated) DEVICE — NEEDLE, QUINCKE, 18GX3.5": Brand: MEDLINE

## (undated) DEVICE — LN SMPL CO2 SHTRM SD STREAM W/M LUER

## (undated) DEVICE — TOTAL TRAY, 16FR 10ML SIL FOLEY, URN: Brand: MEDLINE

## (undated) DEVICE — KT ORCA ORCAPOD DISP STRL

## (undated) DEVICE — TOOL MR8-14MH30 MR8 14CM MATCH 3MM: Brand: MIDAS REX MR8

## (undated) DEVICE — SENSR O2 OXIMAX FNGR A/ 18IN NONSTR

## (undated) DEVICE — NDL HYPO ECLPS SFTY 25G 1IN

## (undated) DEVICE — SPONGE,NEURO,0.5"X1",XR,STRL,LF,10/PK: Brand: MEDLINE

## (undated) DEVICE — GLV SURG BIOGEL LTX PF 8

## (undated) DEVICE — ADAPT CLN BIOGUARD AIR/H2O DISP

## (undated) DEVICE — THE MILL DISPOSABLE - MEDIUM

## (undated) DEVICE — APPL CHLORAPREP HI/LITE 26ML ORNG

## (undated) DEVICE — PK NEURO SPINE 40

## (undated) DEVICE — PATIENT RETURN ELECTRODE, SINGLE-USE, CONTACT QUALITY MONITORING, ADULT, WITH 9FT CORD, FOR PATIENTS WEIGING OVER 33LBS. (15KG): Brand: MEGADYNE

## (undated) DEVICE — ELECTRD BLD EZ CLN MOD XLNG 2.75IN

## (undated) DEVICE — MEDI-VAC YANKAUER SUCTION HANDLE W/BULBOUS TIP: Brand: CARDINAL HEALTH

## (undated) DEVICE — SUT MNCRYL 3/0 PS2 18IN MCP497G

## (undated) DEVICE — CANN O2 ETCO2 FITS ALL CONN CO2 SMPL A/ 7IN DISP LF

## (undated) DEVICE — POLYAXIAL SCREW, 5.5 MM X 35 MM
Type: IMPLANTABLE DEVICE | Site: SPINE LUMBAR | Status: NON-FUNCTIONAL
Brand: INVICTUS
Removed: 2023-07-19

## (undated) DEVICE — TUBING, SUCTION, 1/4" X 10', STRAIGHT: Brand: MEDLINE

## (undated) DEVICE — GLV SURG SENSICARE PI LF PF 8 GRN STRL

## (undated) DEVICE — ADHS SKIN SURG TISS VISC PREMIERPRO EXOFIN HI/VISC FAST/DRY

## (undated) DEVICE — DRP C/ARMOR

## (undated) DEVICE — SPNG GZ WOVN 4X4IN 12PLY 10/BX STRL

## (undated) DEVICE — SINGLE-USE BIOPSY FORCEPS: Brand: RADIAL JAW 4

## (undated) DEVICE — DRSNG SLVR/ANTIBAC PRIMASEAL POST/OP ADHS 3.5X10IN

## (undated) DEVICE — SPONGE,NEURO,0.5"X0.5",XR,STRL,10/PK: Brand: MEDLINE

## (undated) DEVICE — TRAP FLD MINIVAC MEGADYNE 100ML

## (undated) DEVICE — SUT MONOCRYL PLS 3/0 CT1 UD/MF 90CM MCP944H

## (undated) DEVICE — BG TRANSF W/COUPLER SPK 600ML

## (undated) DEVICE — THE TORRENT IRRIGATION SCOPE CONNECTOR IS USED WITH THE TORRENT IRRIGATION TUBING TO PROVIDE IRRIGATION FLUIDS SUCH AS STERILE WATER DURING GASTROINTESTINAL ENDOSCOPIC PROCEDURES WHEN USED IN CONJUNCTION WITH AN IRRIGATION PUMP (OR ELECTROSURGICAL UNIT).: Brand: TORRENT